# Patient Record
Sex: FEMALE | Race: WHITE | NOT HISPANIC OR LATINO | Employment: OTHER | URBAN - METROPOLITAN AREA
[De-identification: names, ages, dates, MRNs, and addresses within clinical notes are randomized per-mention and may not be internally consistent; named-entity substitution may affect disease eponyms.]

---

## 2018-02-23 ENCOUNTER — COMMUNICATION - HEALTHEAST (OUTPATIENT)
Dept: NEUROSURGERY | Facility: CLINIC | Age: 83
End: 2018-02-23

## 2018-02-23 DIAGNOSIS — I60.9 SAH (SUBARACHNOID HEMORRHAGE) (H): ICD-10-CM

## 2018-03-01 ENCOUNTER — OFFICE VISIT - HEALTHEAST (OUTPATIENT)
Dept: GERIATRICS | Facility: CLINIC | Age: 83
End: 2018-03-01

## 2018-03-01 DIAGNOSIS — I10 HYPERTENSION: ICD-10-CM
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
## 2018-03-01 DIAGNOSIS — S72.002A CLOSED LEFT HIP FRACTURE (H): ICD-10-CM

## 2018-03-01 DIAGNOSIS — I60.9 SUBARACHNOID HEMORRHAGE (H): ICD-10-CM

## 2018-03-01 DIAGNOSIS — R52 PAIN MANAGEMENT: ICD-10-CM

## 2018-03-05 ENCOUNTER — OFFICE VISIT - HEALTHEAST (OUTPATIENT)
Dept: GERIATRICS | Facility: CLINIC | Age: 83
End: 2018-03-05

## 2018-03-05 DIAGNOSIS — R29.898 LEG WEAKNESS: ICD-10-CM

## 2018-03-05 DIAGNOSIS — I10 HYPERTENSION: ICD-10-CM

## 2018-03-05 DIAGNOSIS — S72.115D CLOSED NONDISPLACED FRACTURE OF GREATER TROCHANTER OF LEFT FEMUR WITH ROUTINE HEALING, SUBSEQUENT ENCOUNTER: ICD-10-CM

## 2018-03-05 DIAGNOSIS — I60.9 SAH (SUBARACHNOID HEMORRHAGE) (H): ICD-10-CM

## 2018-03-06 ENCOUNTER — OFFICE VISIT - HEALTHEAST (OUTPATIENT)
Dept: NEUROSURGERY | Facility: CLINIC | Age: 83
End: 2018-03-06

## 2018-03-06 ENCOUNTER — HOSPITAL ENCOUNTER (OUTPATIENT)
Dept: CT IMAGING | Facility: CLINIC | Age: 83
Discharge: HOME | End: 2018-03-06
Attending: NEUROLOGICAL SURGERY

## 2018-03-06 DIAGNOSIS — I60.9 SAH (SUBARACHNOID HEMORRHAGE) (H): ICD-10-CM

## 2018-03-06 DIAGNOSIS — S01.01XA SCALP LACERATION: ICD-10-CM

## 2018-03-08 ENCOUNTER — OFFICE VISIT - HEALTHEAST (OUTPATIENT)
Dept: GERIATRICS | Facility: CLINIC | Age: 83
End: 2018-03-08

## 2018-03-08 DIAGNOSIS — S72.115D CLOSED NONDISPLACED FRACTURE OF GREATER TROCHANTER OF LEFT FEMUR WITH ROUTINE HEALING, SUBSEQUENT ENCOUNTER: ICD-10-CM

## 2018-03-08 DIAGNOSIS — I10 HYPERTENSION: ICD-10-CM

## 2018-03-08 DIAGNOSIS — I60.9 SAH (SUBARACHNOID HEMORRHAGE) (H): ICD-10-CM

## 2018-03-08 DIAGNOSIS — S01.01XA SCALP LACERATION: ICD-10-CM

## 2018-03-12 ENCOUNTER — AMBULATORY - HEALTHEAST (OUTPATIENT)
Dept: GERIATRICS | Facility: CLINIC | Age: 83
End: 2018-03-12

## 2021-05-30 ENCOUNTER — RECORDS - HEALTHEAST (OUTPATIENT)
Dept: ADMINISTRATIVE | Facility: CLINIC | Age: 86
End: 2021-05-30

## 2021-06-16 PROBLEM — S01.01XA SCALP LACERATION: Status: ACTIVE | Noted: 2018-03-06

## 2021-06-16 PROBLEM — I16.1 HYPERTENSIVE EMERGENCY: Status: ACTIVE | Noted: 2018-02-21

## 2021-06-16 PROBLEM — I60.9 SUBARACHNOID BLEED (H): Status: ACTIVE | Noted: 2018-02-21

## 2021-06-16 PROBLEM — I60.9 SAH (SUBARACHNOID HEMORRHAGE) (H): Status: ACTIVE | Noted: 2018-02-21

## 2021-06-16 PROBLEM — S72.112A: Status: ACTIVE | Noted: 2018-02-21

## 2021-06-16 NOTE — PROGRESS NOTES
[facility]    Facility:   [facility] [[id number]]   Code Status: DNR      CHIEF COMPLAINT/REASON FOR VISIT:  Chief Complaint   Patient presents with     Follow Up     rehab       HISTORY:      HPI: Crystal is a 82 y.o. female who I had a chance to visit with secondary to her hospitalization February 21 - February 25, 2018 secondary to a fall in the bathroom did sustain a subarachnoid hemorrhage also a left greater trochanteric femur fracture.  She does have a visit tomorrow for CT of the head and does visit with the neurosurgeon on March 13.  She has been normotensive and afebrile.  Only requiring Tylenol no other narcotics.  Appetite is picking up.  Seems to be in really good spirits.  She is hoping to be able to be discharged by the weekend.  Did not have any other particular complaints is working on her strength balance and conditioning.    No past medical history on file.          No family history on file.  Social History     Social History     Marital status:      Spouse name: N/A     Number of children: N/A     Years of education: N/A     Social History Main Topics     Smoking status: Unknown If Ever Smoked     Smokeless tobacco: Not on file     Alcohol use 1.8 oz/week     3 Glasses of wine per week     Drug use: No     Sexual activity: Not on file     Other Topics Concern     Not on file     Social History Narrative         Review of Systems  Currently denies chills fever coughing wheezing chest pain dizziness or vertigo nausea vomiting diarrhea dysuria rashes or sores.      Current Outpatient Prescriptions:      acetaminophen (TYLENOL) 325 MG tablet, Take 2 tablets (650 mg total) by mouth 4 (four) times a day., Disp: , Rfl: 0     acetaminophen (TYLENOL) 325 MG tablet, Take 2 tablets (650 mg total) by mouth every 4 (four) hours as needed., Disp: , Rfl: 0     amLODIPine (NORVASC) 5 MG tablet, Take 1 tablet (5 mg total) by mouth daily., Disp: 30 tablet, Rfl: 1      b complex vitamins tablet, Take 1 tablet by mouth daily., Disp: , Rfl:      famotidine (PEPCID) 20 MG tablet, Take 1 tablet (20 mg total) by mouth daily., Disp: , Rfl: 0     levETIRAcetam (KEPPRA) 250 MG tablet, Take 1 tablet (250 mg total) by mouth 2 (two) times a day for 12 days., Disp: , Rfl: 0     lisinopril (PRINIVIL,ZESTRIL) 20 MG tablet, Take 20 mg by mouth daily., Disp: , Rfl:      MULTIVITAMIN WITH MINERALS (HAIR,SKIN AND NAILS ORAL), Take 1 tablet by mouth daily., Disp: , Rfl:      multivitamin with minerals (THERA-M) 9 mg iron-400 mcg Tab tablet, Take 1 tablet by mouth daily., Disp: , Rfl:      OMEGA-3/DHA/EPA/FISH OIL (FISH OIL-OMEGA-3 FATTY ACIDS) 300-1,000 mg capsule, Take 1 g by mouth daily., Disp: , Rfl:      senna-docusate (PERICOLACE) 8.6-50 mg tablet, Take 1 tablet by mouth 2 (two) times a day as needed for constipation., Disp: , Rfl: 0    .There were no vitals filed for this visit.  Blood pressure 124/85 pulse 68 respirations 20 temperature 96.7  Physical Exam   Constitutional: She is oriented to person, place, and time. No distress.   HENT:   Head: Normocephalic.   Eyes: Pupils are equal, round, and reactive to light.   Neck: Neck supple. No thyromegaly present.   Cardiovascular: Normal rate, regular rhythm and normal heart sounds.    Pulmonary/Chest: Breath sounds normal.   Abdominal: Bowel sounds are normal. There is no tenderness. There is no guarding.   Musculoskeletal:   Working on her strength and balance.  No pain.   Lymphadenopathy:     She has no cervical adenopathy.   Neurological: She is alert and oriented to person, place, and time.   Skin: Skin is warm and dry. No rash noted.         LABS:   Lab Results   Component Value Date    WBC 8.4 02/23/2018    HGB 11.5 (L) 02/23/2018    HCT 33.6 (L) 02/23/2018    MCV 96 02/23/2018     02/23/2018     No results found for this or any previous visit.          ASSESSMENT:      ICD-10-CM    1. SAH (subarachnoid hemorrhage) I60.9    2.  Hypertension I10    3. Closed nondisplaced fracture of greater trochanter of left femur with routine healing, subsequent encounter S72.115D    4. Leg weakness R29.898        PLAN:    At this time no further changes are necessary.  She does have a visit tomorrow getting a CT of the head and does see the neurosurgeon on the 13th.  She is planning on potentially discharging soon.    .    Electronically signed by: Michael Duane Johnson, CNP

## 2021-06-16 NOTE — PROGRESS NOTES
[facility]      Facility:    [facility] [[id number]]  Code Status: UNKNOWN      Chief Complaint/Reason for Visit:  Chief Complaint   Patient presents with     H & P     Mechanical fall, right sided subarachnoid hemorrhage, left greater trochanter fracture, uncontrolled hypertension, seizure prophylaxis, pain management.       HPI:   Crystal is a 82 y.o. female who was recently admitted to the hospital on 2/21/2018.  She did have a fall in which her leg gave out in the bathroom.  She fell to the ground.  She fell onto her left side and did hit her head.  She was brought to the hospital and CT scan showed right sided sub-arachnoid hemorrhage and she also an x-ray that showed left greater trochanter fracture probable.  She was admitted to the ICU and orthopedic surgery and neurosurgery were consulted.  She did have subsequent CT scans with improvements and subarachnoid hemorrhage improved.  Orthopedic surgery did recommend conservative management at this time and follow-up with Orth O neuro in 2 weeks.  She did have uncontrolled hypertension in the hospital and Norvasc was started and she did maintain her lisinopril.  She was put on Keppra for about 2 weeks for seizure prophylactics.  Her overall condition did improve and she was transferred to the TCU at [place] in stable condition.  She does have a past medical history of hypertension however she does live independently alone.  She also has a history of GERD.    Patient is sitting on the side of her bed today.  She looks good and claims she is not in any real pain and pain is well controlled.  She is moving her bowels without difficulty and she is moving all 4 extremities.  She does deny any neurological signs and symptoms and denies any headache at this time.  Her blood pressure is little elevated today however we will check that every shift.  She denies any other issues at this time and staff revealed no new  concerns.    Past Medical History:  History reviewed. No pertinent past medical history.        Surgical History:  History reviewed. No pertinent surgical history.    Family History:   History reviewed. No pertinent family history.    Social History:    Social History     Social History     Marital status:      Spouse name: N/A     Number of children: N/A     Years of education: N/A     Social History Main Topics     Smoking status: Unknown If Ever Smoked     Smokeless tobacco: None     Alcohol use 1.8 oz/week     3 Glasses of wine per week     Drug use: No     Sexual activity: Not Asked     Other Topics Concern     None     Social History Narrative          Review of Systems   Constitutional:        Patient describes her pain is tolerable and minimal at this time.  She denies any fevers chills nausea vomiting diarrhea change in vision hearing taste or smell weakness one side the other chest pain shortness of breath.  She denies any incontinence urine or stool polyphagia or polydipsia polyuria urgency frequency or burning with urination depression or anxiety and the remainder of the review of systems is negative.       Vitals:    03/01/18 0721   BP: 148/87   Pulse: 77   Resp: 20   Temp: 97.6  F (36.4  C)   SpO2: 94%       Physical Exam   Constitutional: No distress.   Patient is a very thin appearing female who does not appear to be in acute distress.   HENT:   Head: Normocephalic and atraumatic.   Nose: Nose normal.   Mouth/Throat: No oropharyngeal exudate.   Eyes: Conjunctivae and EOM are normal. Right eye exhibits no discharge. Left eye exhibits no discharge. No scleral icterus.   Neck: Neck supple. No thyromegaly present.   Cardiovascular: Normal rate and regular rhythm.    Pulmonary/Chest: Effort normal and breath sounds normal. No respiratory distress. She exhibits no tenderness.   Abdominal: Soft. Bowel sounds are normal. She exhibits no distension. There is no tenderness.   Musculoskeletal: She  exhibits tenderness. She exhibits no edema.   Lymphadenopathy:     She has no cervical adenopathy.   Neurological: She is alert. No cranial nerve deficit. She exhibits normal muscle tone.   Skin: Skin is dry. She is not diaphoretic. No erythema. No pallor.   Psychiatric: She has a normal mood and affect. Her behavior is normal.       Medication List:  Current Outpatient Prescriptions   Medication Sig     acetaminophen (TYLENOL) 325 MG tablet Take 2 tablets (650 mg total) by mouth 4 (four) times a day.     acetaminophen (TYLENOL) 325 MG tablet Take 2 tablets (650 mg total) by mouth every 4 (four) hours as needed.     amLODIPine (NORVASC) 5 MG tablet Take 1 tablet (5 mg total) by mouth daily.     b complex vitamins tablet Take 1 tablet by mouth daily.     famotidine (PEPCID) 20 MG tablet Take 1 tablet (20 mg total) by mouth daily.     levETIRAcetam (KEPPRA) 250 MG tablet Take 1 tablet (250 mg total) by mouth 2 (two) times a day for 12 days.     lisinopril (PRINIVIL,ZESTRIL) 20 MG tablet Take 20 mg by mouth daily.     MULTIVITAMIN WITH MINERALS (HAIR,SKIN AND NAILS ORAL) Take 1 tablet by mouth daily.     multivitamin with minerals (THERA-M) 9 mg iron-400 mcg Tab tablet Take 1 tablet by mouth daily.     OMEGA-3/DHA/EPA/FISH OIL (FISH OIL-OMEGA-3 FATTY ACIDS) 300-1,000 mg capsule Take 1 g by mouth daily.     senna-docusate (PERICOLACE) 8.6-50 mg tablet Take 1 tablet by mouth 2 (two) times a day as needed for constipation.       Labs: Hospital labs are as follows; sodium was 137, potassium was 4.0, CO2 is 20, BUN was 12, creatinine 0.58, GFR was greater than 60, LFTs were within normal limits with the exception of albumin of 3.1.  White count was 8.4, hemoglobin was 11.5, platelets were 207,000.      Assessment:    ICD-10-CM    1. Subarachnoid hemorrhage I60.9    2. Fall W19.XXXA    3. Closed left hip fracture S72.002A    4. Pain management R52    5. Hypertension I10        Plan: CT scan of the head in 1 week without  contrast secondary subarachnoid hemorrhage.  Fall precautions will be monitored and will monitor blood pressure every shift at this time.  Her blood pressure slightly elevated at this time will continue to monitor and consider going up on her Norvasc at this time.  Pain is well-managed at this time so nothing will be done and will continue with physical and Occupational Therapy no new changes.        Electronically signed by: Keenan Beauchamp DO

## 2021-06-16 NOTE — PROGRESS NOTES
CHART NOTE     DATE OF SERVICE:  3/6/2018     : 1935   82 y.o.     ASSESSMENT :   .Doing well, function at baseline, SAH resolved,  Lac healed.     PLAN: Staples removed. Loosen restrictions. Restart baby ASA. Stop Keppra as planned. RTC prn. Enc to call with any new questions or concerns.     HPI:  Crystal Ibarra is status post hospital consult by Dr. Tobar on 18 for TRAUMATIC SAH.   Patient initially presented s/p mechanical fall sustaining head lac and closed L hip fx .  Head CT showed small volume subarachnoid hemorrhage within the right postcentral sulcus superiorly.  Keppra x 2 weeks.  Hip fx incomplete, will be treated conservatively per Ortho.     TODAY, Crystal Ibarra reports no HA, no N/V, No dizziness. Memory and cognition are at baseline, per patient and family.  Still has the staples in from her head lac.  Still on Keppra.  Off her baby ASA.      PAST MEDICAL HISTORY, SURGICAL HISTORY, REVIEW OF SYMPTOMS, MEDICATIONS AND ALLERGIES:  Past medical history, surgical history, ROS, medications and allergies reviewed with patient and remain unchanged from previous visit.    No past medical history on file.    PHYSICAL EXAM:    /68  Pulse 72  Resp 18    Neurological exam reveals:  Respirations easy, non-labored.   Skin: W/D/I. No rashes, lesions or breaks in integrity.   Recent and remote memory intact, fund of knowledge wnl.    Alert and oriented x3, speech fluent and appropriate.   Comprehension intact with 2 step crossover command.   Finger nose finger smooth and accurate  PERRL, EOMI, No nystagmus,   Face symmetric, tongue midline, Uvula midline,  palate rises with phonation   Shoulder shrug equal  DURÁN with good strength to exam   No extremity edema noted.   Muscle Bulk and tone wnl.   Reflexes: No pathological reflexes   Gait and station:In WC, not observed  Incision: CDI without erythema or edema, mod scabbing. Staples intact. Area cleansed and staples easily removed, edges remain  well approximated.      RADIOGRAPHIC IMAGING: Head CT:  Resolution of right parietal subarachnoid hemorrhage.     Films personally reviewed and interpreted.   Reviewed imaging with patient and family.

## 2021-06-16 NOTE — PROGRESS NOTES
[facility]    Facility:   [facility] [[id number]]   Code Status: DNR  PCP: No Primary Care Provider   Phone: None   Fax: [phone]      CHIEF COMPLAINT/REASON FOR VISIT:  Chief Complaint   Patient presents with     Discharge Summary       HISTORY COURSE:  Crystal is a 82 y.o. female who was recently admitted to the hospital on 2/21/2018.  She did have a fall in which her leg gave out in the bathroom.  She fell to the ground.  She fell onto her left side and did hit her head.  She was brought to the hospital and CT scan showed right sided sub-arachnoid hemorrhage and she also an x-ray that showed left greater trochanter fracture probable.  She was admitted to the ICU and orthopedic surgery and neurosurgery were consulted.  She did have subsequent CT scans with improvements and subarachnoid hemorrhage improved.  Orthopedic surgery did recommend conservative management at this time and follow-up with [facility] neuro in 2 weeks.  She did have uncontrolled hypertension in the hospital and Norvasc was started and she did maintain her lisinopril.  She was put on Keppra for about 2 weeks for seizure prophylactics.  Her overall condition did improve and she was transferred to the TCU at [place] in stable condition.  She does have a past medical history of hypertension however she does live independently alone.  She also has a history of GERD.  She has been quite successful with the rehabilitation program since her hospitalization.  She has been able to ambulate with the therapy staff.  She has been normotensive afebrile and also on room air.  Is on Tylenol scheduled.  Her left greater trochanter fracture has not been giving her any problems.  She does see orthopedics next week.  She did see neurology did have a CAT scan performed on the sixth also the Keppra was discontinued.  Appetite good.  Afebrile and on room air.  Neurologically intact.  Review of Systems  Currently denies  chills fever coughing wheezing chest pain dizziness or vertigo nausea vomiting diarrhea dysuria rashes or sores.  Vitals:    03/08/18 0817   BP: 150/70   Pulse: 60   Resp: 18   Temp: 97.1  F (36.2  C)   SpO2: 94%       Physical Exam  Constitutional: She is oriented to person, place, and time. No distress.   HENT: .   Eyes: Pupils are equal, round, and reactive to light.   Neck: Neck supple. No thyromegaly present.   Cardiovascular: Normal rate, regular rhythm and normal heart sounds.    Pulmonary/Chest: Breath sounds normal.   Abdominal: Bowel sounds are normal. There is no tenderness. There is no guarding.   Musculoskeletal:   Working on her strength and balance.  No pain.   Lymphadenopathy:     She has no cervical adenopathy.   Neurological: She is alert and oriented to person, place, and time.   Skin: Skin is warm and dry. No rash noted.     Lab Results   Component Value Date    WBC 8.4 02/23/2018    HGB 11.5 (L) 02/23/2018    HCT 33.6 (L) 02/23/2018    MCV 96 02/23/2018     02/23/2018     No results found for this or any previous visit.        MEDICATION LIST:  Current Outpatient Prescriptions   Medication Sig     acetaminophen (TYLENOL) 325 MG tablet Take 2 tablets (650 mg total) by mouth 4 (four) times a day.     amLODIPine (NORVASC) 5 MG tablet Take 1 tablet (5 mg total) by mouth daily.     b complex vitamins tablet Take 1 tablet by mouth daily.     famotidine (PEPCID) 20 MG tablet Take 1 tablet (20 mg total) by mouth daily.     lisinopril (PRINIVIL,ZESTRIL) 20 MG tablet Take 20 mg by mouth daily.     MULTIVITAMIN WITH MINERALS (HAIR,SKIN AND NAILS ORAL) Take 1 tablet by mouth daily.     multivitamin with minerals (THERA-M) 9 mg iron-400 mcg Tab tablet Take 1 tablet by mouth daily.     OMEGA-3/DHA/EPA/FISH OIL (FISH OIL-OMEGA-3 FATTY ACIDS) 300-1,000 mg capsule Take 1 g by mouth daily.       DISCHARGE DIAGNOSIS:    ICD-10-CM    1. SAH (subarachnoid hemorrhage) I60.9    2. Scalp laceration S01.01XA     3. Closed nondisplaced fracture of greater trochanter of left femur with routine healing, subsequent encounter S72.115D    4. Hypertension I10        MEDICAL EQUIPMENT NEEDS:  [employer]    DISCHARGE PLAN/FACE TO FACE:  I certify that services are/were furnished while this patient was under the care of a physician and that a physician or an allowed non-physician practitioner (NPP), had a face-to-face encounter that meets the physician face-to-face encounter requirements. The encounter was in whole, or in part, related to the primary reason for home health. The patient is confined to his/her home and needs intermittent skilled nursing, physical therapy, speech-language pathology, or the continued need for occupational therapy. A plan of care has been established by a physician and is periodically reviewed by a physician.  Date of Face-to-Face Encounter: March 8, 2018    I certify that, based on my findings, the following services are medically necessary home health services: She will be discharging to home with current medications will also have physical and occupational therapy home health aide and nursing.  She will be discharging on March 9 in the home care agency has not yet been identified.    My clinical findings support the need for the above skilled services because: (Please write a brief narrative summary that describes what the RN, PT, SLP, or other services will be doing in the home. A list of diagnoses in this section does not meet the CMS requirements.)  She will require skilled services secondary to her subarachnoid hemorrhage along with a left greater trochanteric fracture along with a history of hypertension medication management    This patient is homebound because: (Please write a brief narrative summary describing the functional limitations as to why this patient is homebound and specifically what makes this patient homebound.)  Secondary to multiple chronic medical conditions    The patient is, or  has been, under my care and I have initiated the establishment of the plan of care. This patient will be followed by a physician who will periodically review the plan of care.    Schedule follow up visit with primary care provider within 7 days to reestablish care.  She did see neurology on March 6.  CAT scan was performed the results not yet back by this dictation.  They did take out the 4 staples in her scalp.  No follow-up is necessary.  She does see somewhat orthopedics for her left hip on next Tuesday.  She will also follow-up with her primary care doctor regarding medication management and any future laboratory studies.  She felt it necessary to be in the transitional care unit and did not have any further questions or concerns.    Discharge coordination of care greater than 30 minutes.  Electronically signed by: Michael Duane Johnson, CNP

## 2022-05-11 ENCOUNTER — APPOINTMENT (OUTPATIENT)
Dept: RADIOLOGY | Facility: HOSPITAL | Age: 87
DRG: 535 | End: 2022-05-11
Attending: FAMILY MEDICINE
Payer: COMMERCIAL

## 2022-05-11 ENCOUNTER — HOSPITAL ENCOUNTER (INPATIENT)
Facility: HOSPITAL | Age: 87
LOS: 5 days | Discharge: NURSING, REHAB OR LONG-TERM CARE | DRG: 535 | End: 2022-05-17
Attending: FAMILY MEDICINE | Admitting: INTERNAL MEDICINE
Payer: COMMERCIAL

## 2022-05-11 ENCOUNTER — APPOINTMENT (OUTPATIENT)
Dept: CT IMAGING | Facility: HOSPITAL | Age: 87
DRG: 535 | End: 2022-05-11
Attending: STUDENT IN AN ORGANIZED HEALTH CARE EDUCATION/TRAINING PROGRAM
Payer: COMMERCIAL

## 2022-05-11 ENCOUNTER — APPOINTMENT (OUTPATIENT)
Dept: CT IMAGING | Facility: HOSPITAL | Age: 87
DRG: 535 | End: 2022-05-11
Attending: FAMILY MEDICINE
Payer: COMMERCIAL

## 2022-05-11 DIAGNOSIS — S32.592A CLOSED FRACTURE OF LEFT INFERIOR PUBIC RAMUS, INITIAL ENCOUNTER (H): ICD-10-CM

## 2022-05-11 DIAGNOSIS — S32.512A FRACTURE OF SUPERIOR PUBIC RAMUS, LEFT, CLOSED, INITIAL ENCOUNTER (H): ICD-10-CM

## 2022-05-11 LAB
ANION GAP SERPL CALCULATED.3IONS-SCNC: 11 MMOL/L (ref 5–18)
BNP SERPL-MCNC: 170 PG/ML (ref 0–167)
BUN SERPL-MCNC: 27 MG/DL (ref 8–28)
CALCIUM SERPL-MCNC: 10.2 MG/DL (ref 8.5–10.5)
CHLORIDE BLD-SCNC: 101 MMOL/L (ref 98–107)
CO2 SERPL-SCNC: 25 MMOL/L (ref 22–31)
CREAT SERPL-MCNC: 0.83 MG/DL (ref 0.6–1.1)
GFR SERPL CREATININE-BSD FRML MDRD: 68 ML/MIN/1.73M2
GLUCOSE BLD-MCNC: 98 MG/DL (ref 70–125)
MAGNESIUM SERPL-MCNC: 1.8 MG/DL (ref 1.8–2.6)
POTASSIUM BLD-SCNC: 3.8 MMOL/L (ref 3.5–5)
POTASSIUM BLD-SCNC: 4.6 MMOL/L (ref 3.5–5)
SARS-COV-2 RNA RESP QL NAA+PROBE: NEGATIVE
SODIUM SERPL-SCNC: 137 MMOL/L (ref 136–145)

## 2022-05-11 PROCEDURE — 99219 PR INITIAL OBSERVATION CARE,LEVEL II: CPT | Performed by: INTERNAL MEDICINE

## 2022-05-11 PROCEDURE — 36415 COLL VENOUS BLD VENIPUNCTURE: CPT | Performed by: HOSPITALIST

## 2022-05-11 PROCEDURE — 70450 CT HEAD/BRAIN W/O DYE: CPT

## 2022-05-11 PROCEDURE — 93005 ELECTROCARDIOGRAM TRACING: CPT | Performed by: FAMILY MEDICINE

## 2022-05-11 PROCEDURE — 80048 BASIC METABOLIC PNL TOTAL CA: CPT | Performed by: FAMILY MEDICINE

## 2022-05-11 PROCEDURE — 250N000013 HC RX MED GY IP 250 OP 250 PS 637: Performed by: INTERNAL MEDICINE

## 2022-05-11 PROCEDURE — 99285 EMERGENCY DEPT VISIT HI MDM: CPT | Mod: 25

## 2022-05-11 PROCEDURE — 71046 X-RAY EXAM CHEST 2 VIEWS: CPT

## 2022-05-11 PROCEDURE — 84132 ASSAY OF SERUM POTASSIUM: CPT | Performed by: HOSPITALIST

## 2022-05-11 PROCEDURE — 73502 X-RAY EXAM HIP UNI 2-3 VIEWS: CPT

## 2022-05-11 PROCEDURE — 83735 ASSAY OF MAGNESIUM: CPT | Performed by: HOSPITALIST

## 2022-05-11 PROCEDURE — 72192 CT PELVIS W/O DYE: CPT

## 2022-05-11 PROCEDURE — C9803 HOPD COVID-19 SPEC COLLECT: HCPCS

## 2022-05-11 PROCEDURE — G0378 HOSPITAL OBSERVATION PER HR: HCPCS

## 2022-05-11 PROCEDURE — 36415 COLL VENOUS BLD VENIPUNCTURE: CPT | Performed by: FAMILY MEDICINE

## 2022-05-11 PROCEDURE — 87635 SARS-COV-2 COVID-19 AMP PRB: CPT | Performed by: FAMILY MEDICINE

## 2022-05-11 PROCEDURE — 83880 ASSAY OF NATRIURETIC PEPTIDE: CPT | Performed by: FAMILY MEDICINE

## 2022-05-11 RX ORDER — AMOXICILLIN 250 MG
2 CAPSULE ORAL 2 TIMES DAILY PRN
Status: DISCONTINUED | OUTPATIENT
Start: 2022-05-11 | End: 2022-05-17 | Stop reason: HOSPADM

## 2022-05-11 RX ORDER — LISINOPRIL 20 MG/1
20 TABLET ORAL DAILY
Status: DISCONTINUED | OUTPATIENT
Start: 2022-05-12 | End: 2022-05-15

## 2022-05-11 RX ORDER — HYDROCHLOROTHIAZIDE 25 MG/1
25 TABLET ORAL DAILY
COMMUNITY
End: 2022-05-17

## 2022-05-11 RX ORDER — ACETAMINOPHEN 325 MG/1
650 TABLET ORAL EVERY 4 HOURS PRN
Status: DISCONTINUED | OUTPATIENT
Start: 2022-05-11 | End: 2022-05-12

## 2022-05-11 RX ORDER — AMOXICILLIN 250 MG
1 CAPSULE ORAL 2 TIMES DAILY PRN
Status: DISCONTINUED | OUTPATIENT
Start: 2022-05-11 | End: 2022-05-17 | Stop reason: HOSPADM

## 2022-05-11 RX ORDER — HYDROCHLOROTHIAZIDE 25 MG/1
25 TABLET ORAL DAILY
Status: DISCONTINUED | OUTPATIENT
Start: 2022-05-12 | End: 2022-05-17 | Stop reason: HOSPADM

## 2022-05-11 RX ADMIN — OXYCODONE HYDROCHLORIDE 5 MG: 5 TABLET ORAL at 23:52

## 2022-05-11 RX ADMIN — AMOXICILLIN AND CLAVULANATE POTASSIUM 1 TABLET: 875; 125 TABLET, FILM COATED ORAL at 19:36

## 2022-05-11 ASSESSMENT — ENCOUNTER SYMPTOMS
RHINORRHEA: 0
COUGH: 1
ARTHRALGIAS: 1

## 2022-05-11 ASSESSMENT — ACTIVITIES OF DAILY LIVING (ADL): DEPENDENT_IADLS:: CLEANING;SHOPPING;TRANSPORTATION

## 2022-05-11 NOTE — ED PROVIDER NOTES
EMERGENCY DEPARTMENT ENCOUNTER      NAME: Crystal Ibarra  AGE: 86 year old female  YOB: 1935  MRN: [id number]  EVALUATION DATE & TIME: No admission date for patient encounter.    PCP: No primary care provider on file.    ED PROVIDER: Joseph Rios M.D.    Chief Complaint   Patient presents with     Fall       FINAL IMPRESSION:  1. Closed fracture of left inferior pubic ramus, initial encounter (H)    2. Fracture of superior pubic ramus, left, closed, initial encounter (H)        ED COURSE & MEDICAL DECISION MAKING:    10:56 AM Met with patient for initial interview and exam. Discussed initial plan for care for their stay in the emergency department, prior records reviewed.  Differential diagnosis includes but not limited to intracranial injury, spinal injury,, heart failure, COPD, contusion, fracture.  Patient presents with a fall, she has some left hip pain.  On initial exam, she has no tenderness of the left hip and has no pain with straight leg raise.  CT scan of the head and neck are ordered.  Also noted to have low oxygen saturation initially, this seems to be improving in the emergency department and oxygen is being titrated down.  PPE: Provider wore gloves, and paper mask.  Pertinent Labs & Imaging studies personally reviewed and interpreted by me. (See chart for details)  11:41 AM patient rechecked, she is unable to stand without significant pain in the left hip with concern for pelvic fracture.  X-rays were ordered.  1:54 PM x-rays do demonstrate fracture of the left superior and inferior pubic rami on the left.  EKG demonstrates sinus rhythm with frequent PVCs.  2:00 PM patient rechecked, we discussed findings of x-ray.  Patient is unable to stand or ambulate without assistance due to pain.  Will admit for physical therapy evaluation, possible TCU placement.  He is agreeable to the plan  2:10 PM Spoke with Dr. Rousseau, hospitalist. They have accepted the patient for admission under  observation.        At the conclusion of the encounter I discussed the results of all of the tests and the disposition. The questions were answered. The patient or family acknowledged understanding and was agreeable with the care plan.     PROCEDURES:   Procedures    MEDICATIONS GIVEN IN THE EMERGENCY:  Medications - No data to display    NEW PRESCRIPTIONS STARTED AT TODAY'S ER VISIT  New Prescriptions    No medications on file       =================================================================    HPI    Patient information was obtained from: Patient      Crystal Ibarra is a 86 year old female with a pertinent history of SAH, OA and HTN who presents to this ED via EMS for evaluation of a fall.     Patient reports having suffered a mechanical fall prior to arrival. She reports having tripped over her feet causing her to fall backwards. She notes her friend who lives above her called EMS for her as well as helped the patient back up. Patient notes having hit her right arm but denies any pain. She is endorsing pain in her left hip. She denies having hit her head as well as any loss of consciousness. Patient is not on any blood thinners. She reports having a cough for a few months which she takes antibiotics for. Patient denies any rhinorrhea as well as any other complaints at the time.     Upon arrival, nurse noted the patient to be 85% on room air prompting her to put 4 liters of oxygen.     REVIEW OF SYSTEMS   Review of Systems   HENT: Negative for rhinorrhea.    Respiratory: Positive for cough (for a few months).    Musculoskeletal: Positive for arthralgias (left hip pain).   Neurological: Negative for syncope.   All other systems reviewed and are negative.     All other systems reviewed and negative    PAST MEDICAL HISTORY:  History reviewed. No pertinent past medical history.    PAST SURGICAL HISTORY:  History reviewed. No pertinent surgical history.    CURRENT MEDICATIONS:    No current facility-administered  "medications for this encounter.     Current Outpatient Medications   Medication     acetaminophen (TYLENOL) 325 MG tablet     amLODIPine (NORVASC) 5 MG tablet     b complex vitamins tablet     famotidine (PEPCID) 20 MG tablet     lisinopril (PRINIVIL,ZESTRIL) 20 MG tablet     MULTIVITAMIN WITH MINERALS (HAIR,SKIN AND NAILS ORAL)     multivitamin with minerals (THERA-M) 9 mg iron-400 mcg Tab tablet     OMEGA-3/DHA/EPA/FISH OIL (FISH OIL-OMEGA-3 FATTY ACIDS) 300-1,000 mg capsule       ALLERGIES:  No Known Allergies    FAMILY HISTORY:  No family history on file.    SOCIAL HISTORY:   Social History     Socioeconomic History     Marital status:    Tobacco Use     Smoking status: Unknown If Ever Smoked     Smokeless tobacco: Never Used   Substance and Sexual Activity     Alcohol use: Yes     Alcohol/week: 3.0 standard drinks     Drug use: No       VITALS:  /63   Pulse 93   Temp 97.9  F (36.6  C) (Oral)   Resp 20   Ht 1.511 m (4' 11.5\")   Wt 43.1 kg (95 lb)   SpO2 95%   BMI 18.87 kg/m      PHYSICAL EXAM:  Physical Exam  Vitals and nursing note reviewed.   Constitutional:       Appearance: Normal appearance.   HENT:      Head: Normocephalic and atraumatic.      Right Ear: External ear normal.      Left Ear: External ear normal.      Nose: Nose normal.      Mouth/Throat:      Mouth: Mucous membranes are moist.   Eyes:      Extraocular Movements: Extraocular movements intact.      Conjunctiva/sclera: Conjunctivae normal.      Pupils: Pupils are equal, round, and reactive to light.   Cardiovascular:      Rate and Rhythm: Normal rate and regular rhythm.   Pulmonary:      Effort: Pulmonary effort is normal.      Breath sounds: Rales (trace crackles in bases bilaterally) present. No wheezing.   Abdominal:      General: Abdomen is flat. There is no distension.      Palpations: Abdomen is soft.      Tenderness: There is no abdominal tenderness. There is no guarding.   Musculoskeletal:         General: Normal " range of motion.      Cervical back: Normal range of motion and neck supple.      Right lower leg: No edema.      Left lower leg: No edema.   Lymphadenopathy:      Cervical: No cervical adenopathy.   Skin:     General: Skin is warm and dry.   Neurological:      General: No focal deficit present.      Mental Status: She is alert and oriented to person, place, and time. Mental status is at baseline.      Comments: No gross focal neurologic deficits   Psychiatric:         Mood and Affect: Mood normal.         Behavior: Behavior normal.         Thought Content: Thought content normal.          LAB:  All pertinent labs reviewed and interpreted.  Results for orders placed or performed during the hospital encounter of 05/11/22   Head CT w/o contrast    Impression    IMPRESSION:  1.  No CT evidence for acute intracranial process.  2.  Brain atrophy and presumed chronic microvascular ischemic changes as above.  3.  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 No signs of pneumonia or failure. Heart and pulmonary vascularity are normal. S-shaped thoracolumbar scoliosis with the thoracic curve convex to the right. Severe degenerative   changes in both shoulders.   XR Pelvis and Hip Left 2 Views    Impression    IMPRESSION: Postoperative change right total hip arthroplasty. Components appear well-seated. Fractures to the left superior and inferior pubic rami adjacent to the left pubic body. Degenerative change left hip joint. No evidence for left hip joint   fracture. Pelvis negative for fracture. Diffuse demineralization. Vascular calcifications.       RADIOLOGY:  Reviewed all pertinent imaging. Please see official radiology report.  XR Pelvis and Hip Left 2 Views   Final Result   IMPRESSION: Postoperative change right total hip arthroplasty. Components appear well-seated. Fractures to the left  superior and inferior pubic rami adjacent to the left pubic body. Degenerative change left hip joint. No evidence for left hip joint    fracture. Pelvis negative for fracture. Diffuse demineralization. Vascular calcifications.      Head CT w/o contrast   Final Result   IMPRESSION:   1.  No CT evidence for acute intracranial process.   2.  Brain atrophy and presumed chronic microvascular ischemic changes as above.   3.  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      Chest XR,  PA & LAT   Final Result   IMPRESSION: Mild basilar fibrosis and scarring in the right middle lobe. No signs of pneumonia or failure. Heart and pulmonary vascularity are normal. S-shaped thoracolumbar scoliosis with the thoracic curve convex to the right. Severe degenerative    changes in both shoulders.          EKG:    Performed at: 11-MAY-2022 13:04  Impression: Sinus rhythm with frequent premature ventricular complexes  Rate: 83 bpm  Rhythm: sinus  Axis: -46  ME Interval: 202 ms  QRS Interval: 78 ms  QTc Interval: 437 ms  ST Changes: None  Comparison: No previous ECGs available    I have independently reviewed and interpreted the EKG(s) documented above.    I, Sandy Davila, am serving as a scribe to document services personally performed by Dr. Rios based on my observation and the provider's statements to me. I, Joseph Rios MD attest that Sandy Davila is acting in a scribe capacity, has observed my performance of the services and has documented them in accordance with my direction.    Joseph Rios M.D.  Emergency Medicine  [facility] EMERGENCY DEPARTMENT  [facility] [facility] [id number] [phone]  Dept: [phone]     Joseph Rios MD  05/11/22 3910

## 2022-05-11 NOTE — CONSULTS
"Care Management Initial Consult    General Information  Assessment completed with: Children, Latoya daughter via phone  Type of CM/SW Visit: Initial Assessment    Primary Care Provider verified and updated as needed: Yes   Readmission within the last 30 days: no previous admission in last 30 days      Reason for Consult: discharge planning  Advance Care Planning: Advance Care Planning Reviewed: no concerns identified          Communication Assessment  Patient's communication style: spoken language (English or Bilingual)                     Living Environment:   People in home: alone     Current living Arrangements: apartment ([facility])      Able to return to prior arrangements: other (see comments) (likely needs TCU)       Family/Social Support:  Care provided by: self, child(kentrell)  Provides care for: no one, unable/limited ability to care for self  Marital Status:   Children, Neighbor          Description of Support System: Supportive, Involved    Support Assessment: Adequate social supports, Adequate family and caregiver support, Patient communicates needs well met    Current Resources:   Patient receiving home care services: No     Community Resources: None  Equipment currently used at home: walker, rolling (\"She won't use the walker inside her apartment, but uses it when she goes outside\".)  Supplies currently used at home: Other (\"she has glasses, but doesn't like to use them\".)    Employment/Financial:  Employment Status: retired     Employment/ Comments: \"Her  was in the [place] War, and he has passed away. But she has never used any benfits\".  Financial Concerns:     Referral to Financial Worker: No       Lifestyle & Psychosocial Needs:  Social Determinants of Health     Tobacco Use: Unknown     Smoking Tobacco Use: Unknown If Ever Smoked     Smokeless Tobacco Use: Never Used   Alcohol Use: Not on file   Financial Resource Strain: Not on file " "  Food Insecurity: Not on file   Transportation Needs: Not on file   Physical Activity: Not on file   Stress: Not on file   Social Connections: Not on file   Intimate Partner Violence: Not on file   Depression: Not on file   Housing Stability: Not on file       Functional Status:  Prior to admission patient needed assistance:   Dependent ADLs:: Ambulation-walker, Independent  Dependent IADLs:: Cleaning, Shopping, Transportation (\"daughter helps\")  Assesssment of Functional Status: Not at baseline with ADL Functioning, Not at baseline with mobility, Not at  functional baseline    Mental Health Status:          Chemical Dependency Status:                Values/Beliefs:  Spiritual, Cultural Beliefs, Christianity Practices, Values that affect care:                 Additional Information:  Crystal lives at [facility] alone. There are no services at her apartment.    Her Daughter normally helps her with some needs, but she currently has COVID. Vonnie Klein states, \"I normally help her with some of her housekeeping, some laundry, and then shopping, and transportation since she has never driven. But I am 4 days into having COVID, so I will be unable to help her if she needed to come home, but hopefully next week I would be able to help her at home if that is needed\".    She will likely need TCU and wants [facility] as 1st choice. She has been there before, \"but it was a long time ago\".     Health transport at discharge.    Latoya daughter [phone].    Deandra Flowers RN      "

## 2022-05-11 NOTE — ED NOTES
Patient's monitor was alarming low HR in 40's via pulse ox. Placed on heart monitor, rate in 80's-90's showing bigeminal PVC's. EKG completed and given to provider.

## 2022-05-11 NOTE — ED TRIAGE NOTES
Patient arrives via [facility] after trip and fall in kitchen in which patient fell backwards hitting back of head, left hip, and left elbow. Patient states that she stumbled backwards and did not hit hard. Denies LOC, not on thinners. Friend who lives upstairs helped her off of the ground and called EMS. Patient reports left hip pain when standing. Denies HA, CP, dizziness, weakness. O2 on arrival was 85% on RA, dropped to 83%, placed on 4L per NC and sats recovered to 95%     Triage Assessment     Row Name 05/11/22 1103       Triage Assessment (Adult)    Airway WDL WDL       Respiratory WDL    Respiratory WDL X;cough;rhythm/pattern    Rhythm/Pattern, Respiratory other (see comments);unlabored  O2 on room air was 83%, placed on 4L nc    Cough Frequency infrequent  taking antibiotics for cough, 3 days left       Skin Circulation/Temperature WDL    Skin Circulation/Temperature WDL WDL       Peripheral/Neurovascular WDL    Peripheral Neurovascular WDL WDL       Cognitive/Neuro/Behavioral WDL    Cognitive/Neuro/Behavioral WDL WDL

## 2022-05-11 NOTE — H&P
[facility]    History and Physical - Hospitalist Service       Date of Admission:  5/11/2022    Assessment & Plan      Crystal Ibarra is a 86 year old female with a history of SAH and HTN who presents to ED for evaluation of left hip pain after a fall.    Mechanical fall, Pelvic fracture: XR reveals fractures to the left superior and inferior pubic rami adjacent to the left pubic body.   - Pain control: patient reports no pain at resting. Tylenol and oxycodone prn.  - PT/OT  - Weight bearing as tolerated.   - Orthopedic surgery consult    Essential hypertension: BP controlled. Resume PTA hydrochlorothiazide and lisinopril.        Diet:  regular diet  DVT Prophylaxis: Low risk. Expect to discharge in 1-2 days  Cruz Catheter: Not present  Central Lines: None  Cardiac Monitoring: None  Code Status:  Full code      Disposition Plan   Expected Discharge: 05/13/2022     Anticipated discharge location:  home vs TCU     The patient's care was discussed with the Bedside Nurse, Care Coordinator/ and Patient.    Smiley Maria MD  Hospitalist Service  [facility]  Securely message with the Vocera Web Console (learn more here)  Text page via [employer] Paging/Directory         ______________________________________________________________________    Chief Complaint   Left hip pain after a fall    History is obtained from the patient    History of Present Illness   Crystal Ibarra is a 86 year old female with a history of SAH and HTN who presents to ED for evaluation of left hip pain after a fall. patient lives alone in a senior apartment. Today, she accidentally fell. She thought that it is because her legs somehow got tangled. She fell backward. She reports not hitting her head. No LOC. She was able to get up by herself so that she called her friend to come help her get up. She developed left hip pain. She was able to bear weight and walk. She reports no fever, cough,  chest pain, SOB, nausea, vomiting, abdominal pain, numbness or weakness of extremities. In ER, she was found to have fractures to the left superior and inferior pubic rami adjacent to the left pubic body. Labs are unremarkable.     Review of Systems    The 10 point Review of Systems is negative other than noted in the HPI or here.     Past Medical History    I have reviewed this patient's medical history and updated it with pertinent information if needed.   Subarachnoid hemorrhage  Essential hypertension    Past Surgical History   I have reviewed this patient's surgical history and updated it with pertinent information if needed.  History reviewed. No pertinent surgical history.    Social History   I have reviewed this patient's social history and updated it with pertinent information if needed.  Social History     Tobacco Use     Smoking status: Unknown If Ever Smoked     Smokeless tobacco: Never Used   Substance Use Topics     Alcohol use: Yes     Alcohol/week: 3.0 standard drinks     Drug use: No       Family History   No pertinent family history    Prior to Admission Medications   Prior to Admission Medications   Prescriptions Last Dose Informant Patient Reported? Taking?   MULTIVITAMIN WITH MINERALS (HAIR,SKIN AND NAILS ORAL) 5/11/2022 at am  Yes Yes   Sig: [MULTIVITAMIN WITH MINERALS (HAIR,SKIN AND NAILS ORAL)] Take 1 tablet by mouth daily.   OMEGA-3/DHA/EPA/FISH OIL (FISH OIL-OMEGA-3 FATTY ACIDS) 300-1,000 mg capsule 5/10/2022 at Unknown time  Yes Yes   Sig: [OMEGA-3/DHA/EPA/FISH OIL (FISH OIL-OMEGA-3 FATTY ACIDS) 300-1,000 MG CAPSULE] Take 1 g by mouth daily.   amLODIPine (NORVASC) 5 MG tablet   No No   Sig: [AMLODIPINE (NORVASC) 5 MG TABLET] Take 1 tablet (5 mg total) by mouth daily.   amoxicillin-clavulanate (AUGMENTIN) 875-125 MG tablet 5/11/2022 at am  Yes Yes   Sig: Take 1 tablet by mouth 2 times daily Take until 5/14   b complex vitamins tablet 5/11/2022 at am  Yes Yes   Sig: [B COMPLEX VITAMINS  TABLET] Take 1 tablet by mouth daily.   hydrochlorothiazide (HYDRODIURIL) 25 MG tablet 5/11/2022 at am  Yes Yes   Sig: Take 25 mg by mouth daily   lisinopril (PRINIVIL,ZESTRIL) 20 MG tablet 5/11/2022 at am  Yes Yes   Sig: [LISINOPRIL (PRINIVIL,ZESTRIL) 20 MG TABLET] Take 20 mg by mouth daily.      Facility-Administered Medications: None     Allergies   No Known Allergies    Physical Exam   Vital Signs: Temp: 97.9  F (36.6  C) Temp src: Oral BP: 133/63 Pulse: 93   Resp: 20 SpO2: 95 % O2 Device: Nasal cannula Oxygen Delivery: 2 LPM  Weight: 95 lbs 0 oz    General appearance: not in acute distress  HEENT: PERRL, EOMI  Lungs: Clear breath sounds in bilateral lung fields  Cardiovascular: Regular rate and rhythm, normal S1-S2  Abdomen: Soft, non tender, no distension  Musculoskeletal: No joint swelling. Left posterior hip tenderness to palpation.  Skin: No rash and no edema  Neurology: AAO ×3.  Cranial nerves II - XII normal.  Normal muscle strength in all four extremities.    Data   Data reviewed today: I reviewed all medications, new labs and imaging results over the last 24 hours.     Recent Labs   Lab 05/11/22  1350      POTASSIUM 4.6   CHLORIDE 101   CO2 25   BUN 27   CR 0.83   ANIONGAP 11   HARJINDER 10.2   GLC 98       CT head:    IMPRESSION:  1.  No CT evidence for acute intracranial process.  2.  Brain atrophy and presumed chronic microvascular ischemic changes as above.  3.  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    XR pelvis:    IMPRESSION: Postoperative change right total hip arthroplasty. Components appear well-seated. Fractures to the left superior and inferior pubic rami adjacent to the left pubic body. Degenerative change left hip joint. No evidence for left hip joint fracture. Pelvis negative for fracture. Diffuse demineralization. Vascular calcifications.

## 2022-05-11 NOTE — PHARMACY-ADMISSION MEDICATION HISTORY
Pharmacy Note - Admission Medication History    Pertinent Provider Information: Augmentin PTA end date 5/14     ______________________________________________________________________    Prior To Admission (PTA) med list completed and updated in EMR.       PTA Med List   Medication Sig Last Dose     amoxicillin-clavulanate (AUGMENTIN) 875-125 MG tablet Take 1 tablet by mouth 2 times daily Take until 5/14 5/11/2022 at am     b complex vitamins tablet [B COMPLEX VITAMINS TABLET] Take 1 tablet by mouth daily. 5/11/2022 at am     hydrochlorothiazide (HYDRODIURIL) 25 MG tablet Take 25 mg by mouth daily 5/11/2022 at am     lisinopril (PRINIVIL,ZESTRIL) 20 MG tablet [LISINOPRIL (PRINIVIL,ZESTRIL) 20 MG TABLET] Take 20 mg by mouth daily. 5/11/2022 at am     MULTIVITAMIN WITH MINERALS (HAIR,SKIN AND NAILS ORAL) [MULTIVITAMIN WITH MINERALS (HAIR,SKIN AND NAILS ORAL)] Take 1 tablet by mouth daily. 5/11/2022 at am     OMEGA-3/DHA/EPA/FISH OIL (FISH OIL-OMEGA-3 FATTY ACIDS) 300-1,000 mg capsule [OMEGA-3/DHA/EPA/FISH OIL (FISH OIL-OMEGA-3 FATTY ACIDS) 300-1,000 MG CAPSULE] Take 1 g by mouth daily. 5/10/2022 at Unknown time       Information source(s): Patient and CareEverywhere/[facility]  Method of interview communication: in-person    Summary of Changes to PTA Med List  New: Augmentin 875-125mg bid , hydrochlorothiazide 25 mg daily  Discontinued: duplicate multivitamin, famotidine (not taking), amlodipine (not taking)  Changed: none    Patient was asked about OTC/herbal products specifically.  PTA med list reflects this.    In the past week, patient estimated taking medication this percent of the time:  greater than 90%.    Allergies were reviewed, assessed, and updated with the patient.      Patient does not use any multi-dose medications prior to admission.    The information provided in this note is only as accurate as the sources available at the time of the update(s).    Thank you for the opportunity to participate in the  care of this patient.    Emely Maurice, [facility]  5/11/2022 2:49 PM

## 2022-05-11 NOTE — ED NOTES
Pt had fall at home, has fractures to the left superior and inferior pubic rami adjacent to the left pubic body.  Pt has remained in bed, states it hurts to try to move or stand, no pain at rest.  Pt's HR has been SR with bigemy/PVC's.   Pt states she does not want to take any pain medications at this time.  Awaiting ortho consult.   is at bedside, awaiting hospital bed.

## 2022-05-12 ENCOUNTER — APPOINTMENT (OUTPATIENT)
Dept: PHYSICAL THERAPY | Facility: HOSPITAL | Age: 87
DRG: 535 | End: 2022-05-12
Attending: INTERNAL MEDICINE
Payer: COMMERCIAL

## 2022-05-12 ENCOUNTER — APPOINTMENT (OUTPATIENT)
Dept: OCCUPATIONAL THERAPY | Facility: HOSPITAL | Age: 87
DRG: 535 | End: 2022-05-12
Attending: INTERNAL MEDICINE
Payer: COMMERCIAL

## 2022-05-12 ENCOUNTER — APPOINTMENT (OUTPATIENT)
Dept: CARDIOLOGY | Facility: HOSPITAL | Age: 87
DRG: 535 | End: 2022-05-12
Attending: STUDENT IN AN ORGANIZED HEALTH CARE EDUCATION/TRAINING PROGRAM
Payer: COMMERCIAL

## 2022-05-12 LAB — LVEF ECHO: NORMAL

## 2022-05-12 PROCEDURE — 93306 TTE W/DOPPLER COMPLETE: CPT

## 2022-05-12 PROCEDURE — 250N000013 HC RX MED GY IP 250 OP 250 PS 637: Performed by: STUDENT IN AN ORGANIZED HEALTH CARE EDUCATION/TRAINING PROGRAM

## 2022-05-12 PROCEDURE — 97535 SELF CARE MNGMENT TRAINING: CPT | Mod: GO

## 2022-05-12 PROCEDURE — 120N000001 HC R&B MED SURG/OB

## 2022-05-12 PROCEDURE — 97162 PT EVAL MOD COMPLEX 30 MIN: CPT | Mod: GP

## 2022-05-12 PROCEDURE — 97530 THERAPEUTIC ACTIVITIES: CPT | Mod: GP

## 2022-05-12 PROCEDURE — 93306 TTE W/DOPPLER COMPLETE: CPT | Mod: 26 | Performed by: INTERNAL MEDICINE

## 2022-05-12 PROCEDURE — G0378 HOSPITAL OBSERVATION PER HR: HCPCS

## 2022-05-12 PROCEDURE — 99232 SBSQ HOSP IP/OBS MODERATE 35: CPT | Performed by: STUDENT IN AN ORGANIZED HEALTH CARE EDUCATION/TRAINING PROGRAM

## 2022-05-12 PROCEDURE — 97166 OT EVAL MOD COMPLEX 45 MIN: CPT | Mod: GO

## 2022-05-12 PROCEDURE — 250N000013 HC RX MED GY IP 250 OP 250 PS 637: Performed by: INTERNAL MEDICINE

## 2022-05-12 RX ORDER — ENOXAPARIN SODIUM 100 MG/ML
40 INJECTION SUBCUTANEOUS EVERY 24 HOURS
Status: DISCONTINUED | OUTPATIENT
Start: 2022-05-13 | End: 2022-05-17 | Stop reason: HOSPADM

## 2022-05-12 RX ORDER — ACETAMINOPHEN 325 MG/1
650 TABLET ORAL 4 TIMES DAILY
Status: DISCONTINUED | OUTPATIENT
Start: 2022-05-12 | End: 2022-05-17 | Stop reason: HOSPADM

## 2022-05-12 RX ADMIN — OXYCODONE HYDROCHLORIDE 5 MG: 5 TABLET ORAL at 04:04

## 2022-05-12 RX ADMIN — ACETAMINOPHEN 650 MG: 325 TABLET ORAL at 21:22

## 2022-05-12 RX ADMIN — ACETAMINOPHEN 650 MG: 325 TABLET ORAL at 18:21

## 2022-05-12 RX ADMIN — AMOXICILLIN AND CLAVULANATE POTASSIUM 1 TABLET: 875; 125 TABLET, FILM COATED ORAL at 21:21

## 2022-05-12 RX ADMIN — OXYCODONE HYDROCHLORIDE 5 MG: 5 TABLET ORAL at 12:44

## 2022-05-12 RX ADMIN — OXYCODONE HYDROCHLORIDE 5 MG: 5 TABLET ORAL at 09:00

## 2022-05-12 RX ADMIN — ACETAMINOPHEN 650 MG: 325 TABLET ORAL at 08:15

## 2022-05-12 RX ADMIN — LISINOPRIL 20 MG: 20 TABLET ORAL at 08:14

## 2022-05-12 RX ADMIN — AMOXICILLIN AND CLAVULANATE POTASSIUM 1 TABLET: 875; 125 TABLET, FILM COATED ORAL at 08:14

## 2022-05-12 RX ADMIN — HYDROCHLOROTHIAZIDE 25 MG: 25 TABLET ORAL at 08:15

## 2022-05-12 ASSESSMENT — ACTIVITIES OF DAILY LIVING (ADL)
ADLS_ACUITY_SCORE: 40
ADLS_ACUITY_SCORE: 37
ADLS_ACUITY_SCORE: 38

## 2022-05-12 NOTE — CONSULTS
ORTHOPEDIC CONSULTATION    Consultation  Crystal Ibarra,  1935, MRN [id number]    Fracture of superior pubic ramus, left, closed, initial encounter (H) [S32.863X]    PCP: Arcelia Pierre, [phone]   Code status:  Full Code       Extended Emergency Contact Information  Primary Emergency Contact: Latoya Koch   [facility]  Home Phone: [phone]  Mobile Phone: [phone]  Relation: Daughter  Secondary Emergency Contact: Rigoberto Koch   [facility]  Home Phone: [phone]  Relation: Son-in-Law         IMPRESSION:  Lateral compression type I (LC1) left pelvic ring fracture     PLAN:  This patient was discussed with Dr. Flores, on-call surgeon for [facility] and they are in agreement with the following plan. Orthopedics was consulted for evaluation/treamtent of acute left pelvic ring fracture. Patient reports left hip pain after a mechanical fall. XR/CT reveals lateral compression type I (LC1) left pelvic ring fracture. Imaging and physical exam do not indicate urgent/emergent surgery during hospital stay. Recommendation below:    - WBAT with walker and assistance  - Pain Management  - PT/OT evaluation  - No urgent/emergent surgical intervention indicated   - Ortho will sign off at this time. Please reach out if there is any questions/concerns     Thank you for including [facility] in the care of Crystal Ibarra. It has been a pleasure participating in her care.        CHIEF COMPLAINT: Left hip pain    HISTORY OF PRESENT ILLNESS:  The patient is seen in orthopedic consultation at the request of Dr. Maria.  The patient is a 86 year old female with mild and moderate pain, discomfort and ache of the left  hip. The patient reports that she fell yesterday (2022). Her feet got tangled and fell backward. She was not able to get up and experience left hip pain. She reports pain is worse with movement and relief when she is not moving. They report that their pain does not radiate.  Denies numbness or tingling of the lower leg      ALLERGIES:   Review of patient's allergies indicates No Known Allergies      MEDICATIONS UPON ADMISSION:  Medications were reviewed.  They include:   (Not in a hospital admission)        SOCIAL HISTORY:   she  has an unknown smoking status. She has never used smokeless tobacco. She reports current alcohol use of about 3.0 standard drinks of alcohol per week. She reports that she does not use drugs.      FAMILY HISTORY:  family history is not on file.      REVIEW OF SYSTEMS:   Reviewed with patient. See HPI, otherwise negative       PHYSICAL EXAMINATION:  Vitals: Temp:  [97.8  F (36.6  C)-98.6  F (37  C)] 98.4  F (36.9  C)  Pulse:  [74-98] 86  Resp:  [20-36] 20  BP: ()/(54-79) 120/63  SpO2:  [88 %-97 %] 91 %  General: On examination, the patient is awake and alert and oriented to person, place, time, and and general circumstances   SKIN: There is no evidence of ecchymosis, erythema, warmth, open wound or break in the skin of the LLE.  Pulses:  dorsalis pedis pulse is intact and equal bilaterally  Sensation: intact and equal bilaterally to the distal lower extremities.  Tenderness: no tenderness to the left lateral hip.  ROM: Full flexion and extension of the left hip and ankle.  Motor: LLE strength intact  Contralateral side= Full range of motion, Negative joint instability findings, 5/5 motor groups about the joint, Non-tender.       RADIOGRAPHIC EVALUATION:  Personally reviewed    Narrative & Impression   EXAM: CT PELVIS BONE WO CONTRAST  LOCATION: [facility]  DATE/TIME: 5/11/2022 9:32 PM     INDICATION: Pelvic fracture  COMPARISON: Radiographs from 05/11/2022.  TECHNIQUE: CT scan of the pelvis was performed without IV contrast. Multiplanar reformats were obtained. Dose reduction techniques were used.  CONTRAST: None.     FINDINGS:     PELVIS ORGANS: Extensive atherosclerotic vascular calcifications. Mildly distended urinary bladder.  Distal colonic diverticulosis. Small amount of stranding along the left pelvic sidewall consistent with pelvic hematoma from recent fracture.     MUSCULOSKELETAL: Extensive osseous demineralization. Acute fractures of the left superior pubic ramus near the pubic symphysis as well as the midportion of the left inferior pubic ramus. There is also a minimally displaced fracture of the lateral aspect   of the left sacral ala. The sacroiliac joints appear symmetric and intact. 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 No periprosthetic fracture. There is arthritic change of the left hip but no acute left hip fracture.                                                                      IMPRESSION:  1.  Findings consistent with a lateral compression type I (LC1) left pelvic ring fracture. Small left pelvic sidewall hematoma.       PERTINENT LABS:  Lab Results: personally reviewed.   Lab Results   Component Value Date     05/11/2022    CO2 25 05/11/2022    BUN 27 05/11/2022     Lab Results   Component Value Date    WBC 8.4 02/23/2018    HGB 11.5 02/23/2018    HCT 33.6 02/23/2018    MCV 96 02/23/2018     02/23/2018         Raven Carmen PA-C, [facility]  Date: 5/12/2022  Time: 11:17 AM    CC1:   Esha Salvador*    CC2:   Arcelia Pierre

## 2022-05-12 NOTE — UTILIZATION REVIEW
Admission Status; Secondary Review Determination   Under the authority of the Utilization Management Committee, the utilization review process indicated a secondary review on Crystal Ibarra. The review outcome is based on review of the medical records, discussions with staff, and applying clinical experience noted on the date of the review.   (x) Inpatient Status Appropriate - This patient's medical care is consistent with medical management for inpatient care and reasonable inpatient medical practice.     RATIONALE FOR DETERMINATION   86 yr old female with hx HTN presented after fall with hip pain.  Noted left superior and inferior pubic rami fracture adjacent to left pubic body.  While working on pain control has been noted to have hypoxia requiring 2L NC with sats <88% initially now 91% on 2L.  Also having HR bigemy/PVCs of no prior hx and undetermined significance at this time. Anticipated patient will require second night stay for pain management as well as assessing new hypoxia.    At the time of admission with the information available to the attending physician more than 2 nights Hospital complex care was anticipated, based on patient risk of adverse outcome if treated as outpatient and complex care required. Inpatient admission is appropriate based on the Medicare guidelines.   The information on this document is developed by the utilization review team in order for the business office to ensure compliance. This only denotes the appropriateness of proper admission status and does not reflect the quality of care rendered.   The definitions of Inpatient Status and Observation Status used in making the determination above are those provided in the CMS Coverage Manual, Chapter 1 and Chapter 6, section 70.4.   Sincerely,   Shannan Calvillo MD  Utilization Review  Physician Advisor  [facility]

## 2022-05-12 NOTE — ED NOTES
Pts son in law rigoberto called wanting to visit pt.  Rigoberto is on his 9th day of covid and wondering if he can come visit.  I deferred to the supervisor on the policy for that now and nata(supervisor) said he can come in to visit on day 11,which is Saturday. I called rigoberto back and told him of the policy. He was ok with that.

## 2022-05-12 NOTE — PROGRESS NOTES
05/12/22 1500   Quick Adds   Type of Visit Initial Occupational Therapy Evaluation   Living Environment   People in Home alone   Current Living Arrangements independent living facility   Transportation Anticipated family or friend will provide   Living Environment Comments Does not drive   Instrumental Activities of Daily Living (IADL)   IADL Comments Daughter assists with laundry, cleaning and shopping   General Information   Onset of Illness/Injury or Date of Surgery 05/11/22   Referring Physician Smiley Maria MD   Patient/Family Therapy Goal Statement (OT) To go home   Existing Precautions/Restrictions fall;weight bearing;other (see comments)  (LLE WBAT)   Left Lower Extremity (Weight-bearing Status) weight-bearing as tolerated (WBAT)   Cognitive Status Examination   Orientation Status person;place   Safety Deficit impulsivity   Cognitive Status Comments Further cognitive assessment recommended.   Range of Motion Comprehensive   General Range of Motion bilateral upper extremity ROM WFL   Transfers   Transfers sit-stand transfer   Sit-Stand Transfer   Sit-Stand [place] (Transfers) minimum assist (75% patient effort)   Assistive Device (Sit-Stand Transfers) walker, front-wheeled   Sit/Stand Transfer Comments Sit<>stand with FWW X 3 trials. Max verbal cues for hand placement and walker safety. Patient impulsive and stood from chair prior to full instructions given. Impulsive to sit back down quickly and without upper body support. Unable to remain in a standing position for longer than 1-2 seconds.   Clinical Impression   Criteria for Skilled Therapeutic Interventions Met (OT) Yes, treatment indicated   OT Diagnosis Decreased ADL independence   Influenced by the following impairments Pelvic fx   OT Problem List-Impairments impacting ADL problems related to;activity tolerance impaired;balance;fear & anxiety;mobility;range of motion (ROM);strength;pain;cognition   Assessment of Occupational Performance 3-5  Performance Deficits   Identified Performance Deficits dressing, toileting, showering, transfers, bed mobility, standing balance.   Planned Therapy Interventions (OT) ADL retraining;IADL retraining;balance training;bed mobility training;cognition;strengthening;transfer training;progressive activity/exercise   Clinical Decision Making Complexity (OT) moderate complexity   Risk & Benefits of therapy have been explained evaluation/treatment results reviewed   OT Discharge Planning   OT Discharge Recommendation (DC Rec) Transitional Care Facility   OT Rationale for DC Rec Needs assist of 1 for all transfers and ADL's.   Total Evaluation Time (Minutes)   Total Evaluation Time (Minutes) 15   OT Goals   Therapy Frequency (OT) Daily   OT Predicted Duration/Target Date for Goal Attainment 05/18/22   OT Goals Hygiene/Grooming;Lower Body Dressing;Transfers;Toilet Transfer/Toileting   OT: Hygiene/Grooming supervision/stand-by assist;while standing   OT: Lower Body Dressing Supervision/stand-by assist;using adaptive equipment   OT: Transfer Supervision/stand-by assist;with assistive device   OT: Toilet Transfer/Toileting Supervision/stand-by assist

## 2022-05-12 NOTE — PLAN OF CARE
PRIMARY DIAGNOSIS: ACUTE PAIN  OUTPATIENT/OBSERVATION GOALS TO BE MET BEFORE DISCHARGE:  1. Pain Status: Improved with use of alternative comfort measures i.e.: positioning and distraction using TV and visitors    2. Return to near baseline physical activity: No    3. Cleared for discharge by consultants (if involved): No    Pt able to logroll for bedpan this morning; complains of pain with movement.  Denied need for pain meds while at rest.  Awaiting PT/OT eval.  Transferring to room 426 for continued care.  Report called to receiving Ashley FLORES.

## 2022-05-12 NOTE — PLAN OF CARE
Problem: Functional Ability Impaired (Orthopaedic Fracture)  Goal: Optimal Functional Ability  Outcome: Ongoing, Progressing  Intervention: Optimize Functional Ability     Problem: Pain (Orthopaedic Fracture)  Goal: Acceptable Pain Control  Outcome: Ongoing, Progressing  Intervention: Manage Acute Orthopaedic-Related Pain    Pt reports back and pelvis/hip pain.  Pain controlled with prn oxycodone given x2 and repositioning prn.  Has not been OOB yet, using purewick.  On 2L O2 with sat 91-95%.  Tele with frequent PVCs/ transitioning in and out of [facility]. K and Mg checked.

## 2022-05-12 NOTE — PROVIDER NOTIFICATION
Dr. Salvador updated about pt's tele showing trigeminy w/ PVC of 24-26 per min. HR 63. O2 sat 94% on 2 L via NC. Pt was asymptomatic when assessed. New order for echocardiogram placed by MD.

## 2022-05-12 NOTE — PROGRESS NOTES
05/12/22 1325   Quick Adds   Type of Visit Initial PT Evaluation   Living Environment   People in Home alone   Current Living Arrangements independent living facility   Home Accessibility no concerns   Transportation Anticipated family or friend will provide   Self-Care   Usual Activity Tolerance good   Current Activity Tolerance poor   Equipment Currently Used at Home walker, rolling  (4WW)   Fall history within last six months yes   General Information   Onset of Illness/Injury or Date of Surgery 05/11/22   Referring Physician Smiley Maria MD   Patient/Family Therapy Goals Statement (PT) return home   Pertinent History of Current Problem (include personal factors and/or comorbidities that impact the POC) lateral compression type I (LC1) left pelvic ring fracture   Existing Precautions/Restrictions fall   General Observations WBAT with FWW per Ortho   Strength (Manual Muscle Testing)   Strength (Manual Muscle Testing) Deficits observed during functional mobility   Bed Mobility   Bed Mobility supine-sit   Supine-Sit [place] (Bed Mobility) supervision;verbal cues   Assistive Device (Bed Mobility) bed rails   Comment, (Bed Mobility) Increased time and effort due to pain.   Transfers   Transfers sit-stand transfer;bed-chair transfer   Bed-Chair Transfer   Assistive Device (Bed-Chair Transfers)   (none)   Bed-Chair [place] (Transfers) moderate assist (50% patient effort);maximum assist (25% patient effort);2 person assist   Comment, (Bed-Chair Transfer) Arm in arm, pivot transfer   Sit-Stand Transfer   Sit-Stand [place] (Transfers) minimum assist (75% patient effort);verbal cues;2 person assist   Assistive Device (Sit-Stand Transfers) walker, front-wheeled   Gait/Stairs (Locomotion)   Comment, (Gait/Stairs) n/a- pt unable to amb at this time.   Clinical Impression   Criteria for Skilled Therapeutic Intervention Yes, treatment indicated   PT Diagnosis (PT) Impaired functional mobility   Influenced by  the following impairments pain, weakness   Functional limitations due to impairments Impaired transfers, impaired gait, impaired strength   Clinical Presentation (PT Evaluation Complexity) Stable/Uncomplicated   Clinical Presentation Rationale Presents as diagnosed   Clinical Decision Making (Complexity) moderate complexity   Planned Therapy Interventions (PT) balance training;bed mobility training;gait training;strengthening;transfer training   Anticipated Equipment Needs at Discharge (PT) walker, rolling   Risk & Benefits of therapy have been explained patient   PT Discharge Planning   PT Discharge Recommendation (DC Rec) Transitional Care Facility   PT Rationale for DC Rec Pt lives alone, Ax2 for mobility. Unable to ambulate.   Total Evaluation Time   Total Evaluation Time (Minutes) 5   Physical Therapy Goals   PT Frequency Daily   PT Predicted Duration/Target Date for Goal Attainment 05/19/22   PT Goals Bed Mobility;Transfers;Gait   PT: Bed Mobility Supervision/stand-by assist;Supine to/from sit   PT: Transfers Supervision/stand-by assist;Sit to/from stand;Bed to/from chair;Assistive device   PT: Gait Minimal assist;Rolling walker;25 feet       Raquel Contreras, PT, DPT  5/12/2022

## 2022-05-12 NOTE — PROGRESS NOTES
Pt arrived to room 426 from boarding in ED around 1230.  Pt arrived with pain of 7 out of 10 with activity, but stated no pain at rest.  Received Oxycodone at 1244.  Pt alert and orientated.  Tele patches applied as MD restarted Tele monitoring.

## 2022-05-12 NOTE — ED NOTES
Pts son in law janeen called wondering if he could come into the hospital to visit pt. He is 9 days into

## 2022-05-12 NOTE — PROGRESS NOTES
[facility]    Medicine Progress Note - Hospitalist Service    Date of Admission:  5/11/2022    Assessment & Plan        Crystal Ibarra is a 86 year old female with a history of SAH and HTN was admitted May 11, 2022 with a left superior and inferior pubic rami hip fracture following a mechanical fall.    Acute left pelvic ring fracture  Left hip pain  Mechanical fall   -Patient sustained a fall on May 11, feet got entangled, tripped and fell backward  -Sustained significant left hip pain, worse with movement  -CT pelvis consistent with a lateral compression type I left pelvic ring fracture, with a small left pelvic sidewall hematoma.  -Orthopedics consulted, no urgent or emergent surgical intervention indicated  -PT OT evaluation  -Pain control: patient reports no pain at resting. Scheduled tylenol and oxycodone prn.  -Weight bearing as tolerated.     # Essential hypertension:   - BP controlled.   - Resume PTA hydrochlorothiazide and lisinopril.    # Hypoxia  # Resolving community-acquired pneumonia/bronchitis  -Seen by PCP May 9 , with 3 weeks of cough, treated for possible bronchitis  -Started Augmentin May 9, will continue here to complete a 5-day course  - In the ED, patient requiring 2 L of oxygen to keep sats over 92%  - X-ray with mild bibasilar fibrosis and scarring in the right middle lobe  - Patient non tachycardic at this time, Wells score 1.5.  Low risk for PE at this time  - Hypoxia may be related to side effects of opioids  - Wean off as able, continue to monitor    Frequent PVCs/bigeminy  -Seen on telemetry in the ED.   -Patient is asymptomatic  -Potassium 3.8, mag 1.8  -Keep on telemetry.  Echo ordered.    Hypercalcemia: Ca = 10.2 mg/dL (Ref range: 8.5 - 10.5 mg/dL) and/or iCa = N/A on admission, will monitor as appropriate     Diet: Regular Diet Adult    DVT Prophylaxis: Enoxaparin (Lovenox) SQ  Cruz Catheter: Not present  Central Lines: None  Cardiac Monitoring: None  Code  Status: Full Code      Disposition Plan   Expected Discharge: 05/13/2022     Anticipated discharge location:  Awaiting care coordination huddle  Delays: PT / OT dispo recommendations, resolution of hypoxia    The patient's care was discussed with the Bedside Nurse and patient    Esha Salvador MD  Hospitalist Service  [facility]  Securely message with the Vocera Web Console (learn more here)  Text page via [employer] Paging/Directory             ______________________________________________________________________    Interval History   Patient new to me  Care team notes reviewed  Evaluated patient at bedside.  Found patient propped up in bed on 2 L of nasal cannula.  At present complains of left hip pain when moved changes position, pain is relieved when not moving.  Pain non radiating and not associated with any numbness or tingling of the lower extremities    At this time patient denies any fevers, chills  Has been having a cough for the last 3 weeks, saw PCP who prescribed Augmentin.  Cough improving.  No chest pain.  Does not feel any short of breath.  Denies any dysuria frequency or lower abdominal pain  Denies any diarrhea, nausea, vomiting.  Care plan discussed.    Data reviewed today: I reviewed all medications, new labs and imaging results over the last 24 hours.     Physical Exam   Vital Signs: Temp: 98.6  F (37  C) Temp src: Tympanic BP: 106/66 Pulse: 74   Resp: 20 SpO2: 93 % O2 Device: Nasal cannula Oxygen Delivery: 2 LPM  Weight: 95 lbs 0 oz    General: AAOx3, NAD  HEENT: Oral mucosa moist and non-erythematous, PERRLA, EOM intact  CV: RRR, normal S1S2, no murmur, clicks, rubs  Resp: Clear to auscultation bilaterally, no wheezes, rhonchi  Abd: Soft, non-tender, BS+, no masses appreciated  Extremities: Radial and pedal pulses intact and symmetric, no pedal edema  Neuro: No lateralizing symptoms or focal neurologic deficits      Data   Recent Labs   Lab 05/11/22  0478  22  1350   NA  --  137   POTASSIUM 3.8 4.6   CHLORIDE  --  101   CO2  --  25   BUN  --  27   CR  --  0.83   ANIONGAP  --  11   HARJINDER  --  10.2   GLC  --  98     Recent Results (from the past 24 hour(s))   CT Pelvis Bone wo Contrast    Narrative    EXAM: CT PELVIS BONE WO CONTRAST  LOCATION: [facility]  DATE/TIME: 2022 9:32 PM    INDICATION: Pelvic fracture  COMPARISON: Radiographs from 2022.  TECHNIQUE: CT scan of the pelvis was performed without IV contrast. Multiplanar reformats were obtained. Dose reduction techniques were used.  CONTRAST: None.    FINDINGS:    PELVIS ORGANS: Extensive atherosclerotic vascular calcifications. Mildly distended urinary bladder. Distal colonic diverticulosis. Small amount of stranding along the left pelvic sidewall consistent with pelvic hematoma from recent fracture.    MUSCULOSKELETAL: Extensive osseous demineralization. Acute fractures of the left superior pubic ramus near the pubic symphysis as well as the midportion of the left inferior pubic ramus. There is also a minimally displaced fracture of the lateral aspect   of the left sacral ala. The sacroiliac joints appear symmetric and intact. 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 No periprosthetic fracture. There is arthritic change of the left hip but no acute left hip fracture.      Impression    IMPRESSION:  1.  Findings consistent with a lateral compression type I (LC1) left pelvic ring fracture. Small left pelvic sidewall hematoma.   Echocardiogram Complete   Result Value    LVEF  60-65%    Narrative    [id number]  [id number]  [id number]  [id number]^JONES^CAL[place]IN[place] [address]     Name: MIQUEL MORRISON  MRN: [id number]  : 1935  Study Date: 2022 04:13 PM  Age: 86 yrs  Gender: Female  Patient Location: [facility]  Reason For Study: Abn EKG  Ordering Physician: CALVIN GOLDMAN  Performed By: ACE     BSA: 1.3 m2  Height: 59  in  Weight: 94 lb  HR: 42  BP: 100/52 mmHg  ______________________________________________________________________________  Procedure  Complete Echo Adult. There is no comparison study available. Technically  difficult, suboptimal study.  ______________________________________________________________________________  Interpretation Summary     1.Left ventricular size, wall motion and function are normal. The ejection  fraction is 60-65%.  2.Normal right ventricle size and systolic function.  3.The left atrium is mild to moderately dilated. The right atrium is mildly  dilated.  4.There is moderate (2+) tricuspid regurgitation.  5.Technically difficult, suboptimal study due to patient cooperation.  There is no comparison study available.  ______________________________________________________________________________  I      WMSI = 1.00     % Normal = 100     X - Cannot   0 -                      (2) - Mildly 2 -          Segments  Size  Interpret    Hyperkinetic 1 - Normal  Hypokinetic  Hypokinetic  1-2     small                                                     7 -          3-5      moderate  3 - Akinetic 4 -          5 -         6 - Akinetic Dyskinetic   6-14    large               Dyskinetic   Aneurysmal  w/scar       w/scar       15-16   diffuse     Left Ventricle  Left ventricular size, wall motion and function are normal. The ejection  fraction is 60-65%. There is normal left ventricular wall thickness. Left  ventricular diastolic function is normal. No regional wall motion  abnormalities noted.     Right Ventricle  Normal right ventricle size and systolic function.     Atria  The left atrium is mild to moderately dilated. The right atrium is mildly  dilated. There is no color Doppler evidence of an atrial shunt.     Mitral Valve  There is moderate mitral annular calcification. The mitral valve leaflets  appear thickened, but open well. There is trace mitral regurgitation. There is  no mitral valve stenosis.      Tricuspid Valve  The tricuspid valve is not well visualized, but is grossly normal. There is  moderate (2+) tricuspid regurgitation. There is no tricuspid stenosis.     Aortic Valve  The aortic valve is trileaflet. Thickened aortic valve leaflets. No aortic  regurgitation is present. No aortic stenosis is present.     Pulmonic Valve  The pulmonic valve is not well seen, but is grossly normal. This degree of  valvular regurgitation is within normal limits. There is no pulmonic valvular  stenosis.     Vessels  The aorta root is normal. Normal size ascending aorta. IVC diameter <2.1 cm  collapsing >50% with sniff suggests a normal RA pressure of 3 mmHg.     Pericardium  There is no pericardial effusion.     Rhythm  Sinus rhythm was noted.     ______________________________________________________________________________  MMode/2D Measurements & Calculations  IVSd: 0.79 cm  LVIDd: 3.8 cm  LVIDs: 2.3 cm  LVPWd: 0.68 cm  FS: 40.0 %     LV mass(C)d: 77.5 grams  LV mass(C)dI: 58.0 grams/m2  Ao root diam: 3.0 cm  LA dimension: 3.2 cm  LA/Ao: 1.1  LVOT diam: 2.2 cm  LVOT area: 3.9 cm2  LA Volume Indexed (AL/bp): 40.3 ml/m2  RWT: 0.35     Time Measurements  MM HR: 56.0 BPM     ______________________________________________________________________________  Report approved by: Mckenna Escalante 05/12/2022 04:57 PM

## 2022-05-13 ENCOUNTER — APPOINTMENT (OUTPATIENT)
Dept: OCCUPATIONAL THERAPY | Facility: HOSPITAL | Age: 87
DRG: 535 | End: 2022-05-13
Payer: COMMERCIAL

## 2022-05-13 ENCOUNTER — APPOINTMENT (OUTPATIENT)
Dept: PHYSICAL THERAPY | Facility: HOSPITAL | Age: 87
DRG: 535 | End: 2022-05-13
Payer: COMMERCIAL

## 2022-05-13 LAB
ANION GAP SERPL CALCULATED.3IONS-SCNC: 11 MMOL/L (ref 5–18)
ATRIAL RATE - MUSE: 83 BPM
BASOPHILS # BLD AUTO: 0.1 10E3/UL (ref 0–0.2)
BASOPHILS NFR BLD AUTO: 1 %
BUN SERPL-MCNC: 26 MG/DL (ref 8–28)
CALCIUM SERPL-MCNC: 8.9 MG/DL (ref 8.5–10.5)
CHLORIDE BLD-SCNC: 99 MMOL/L (ref 98–107)
CO2 SERPL-SCNC: 23 MMOL/L (ref 22–31)
CREAT SERPL-MCNC: 0.78 MG/DL (ref 0.6–1.1)
DIASTOLIC BLOOD PRESSURE - MUSE: 63 MMHG
EOSINOPHIL # BLD AUTO: 0.2 10E3/UL (ref 0–0.7)
EOSINOPHIL NFR BLD AUTO: 2 %
ERYTHROCYTE [DISTWIDTH] IN BLOOD BY AUTOMATED COUNT: 14 % (ref 10–15)
GFR SERPL CREATININE-BSD FRML MDRD: 74 ML/MIN/1.73M2
GLUCOSE BLD-MCNC: 104 MG/DL (ref 70–125)
HCT VFR BLD AUTO: 35.8 % (ref 35–47)
HGB BLD-MCNC: 12.4 G/DL (ref 11.7–15.7)
IMM GRANULOCYTES # BLD: 0.1 10E3/UL
IMM GRANULOCYTES NFR BLD: 1 %
INTERPRETATION ECG - MUSE: NORMAL
LYMPHOCYTES # BLD AUTO: 0.8 10E3/UL (ref 0.8–5.3)
LYMPHOCYTES NFR BLD AUTO: 9 %
MCH RBC QN AUTO: 32.5 PG (ref 26.5–33)
MCHC RBC AUTO-ENTMCNC: 34.6 G/DL (ref 31.5–36.5)
MCV RBC AUTO: 94 FL (ref 78–100)
MONOCYTES # BLD AUTO: 0.8 10E3/UL (ref 0–1.3)
MONOCYTES NFR BLD AUTO: 9 %
NEUTROPHILS # BLD AUTO: 7.6 10E3/UL (ref 1.6–8.3)
NEUTROPHILS NFR BLD AUTO: 78 %
NRBC # BLD AUTO: 0 10E3/UL
NRBC BLD AUTO-RTO: 0 /100
P AXIS - MUSE: 52 DEGREES
PLATELET # BLD AUTO: 230 10E3/UL (ref 150–450)
POTASSIUM BLD-SCNC: 3.4 MMOL/L (ref 3.5–5)
POTASSIUM BLD-SCNC: 3.8 MMOL/L (ref 3.5–5)
PR INTERVAL - MUSE: 202 MS
QRS DURATION - MUSE: 78 MS
QT - MUSE: 372 MS
QTC - MUSE: 437 MS
R AXIS - MUSE: -46 DEGREES
RBC # BLD AUTO: 3.82 10E6/UL (ref 3.8–5.2)
SODIUM SERPL-SCNC: 133 MMOL/L (ref 136–145)
SYSTOLIC BLOOD PRESSURE - MUSE: 133 MMHG
T AXIS - MUSE: 36 DEGREES
VENTRICULAR RATE- MUSE: 83 BPM
WBC # BLD AUTO: 9.5 10E3/UL (ref 4–11)

## 2022-05-13 PROCEDURE — 36415 COLL VENOUS BLD VENIPUNCTURE: CPT | Performed by: STUDENT IN AN ORGANIZED HEALTH CARE EDUCATION/TRAINING PROGRAM

## 2022-05-13 PROCEDURE — 80048 BASIC METABOLIC PNL TOTAL CA: CPT | Performed by: STUDENT IN AN ORGANIZED HEALTH CARE EDUCATION/TRAINING PROGRAM

## 2022-05-13 PROCEDURE — 85004 AUTOMATED DIFF WBC COUNT: CPT | Performed by: STUDENT IN AN ORGANIZED HEALTH CARE EDUCATION/TRAINING PROGRAM

## 2022-05-13 PROCEDURE — 250N000011 HC RX IP 250 OP 636: Performed by: STUDENT IN AN ORGANIZED HEALTH CARE EDUCATION/TRAINING PROGRAM

## 2022-05-13 PROCEDURE — 97530 THERAPEUTIC ACTIVITIES: CPT | Mod: GO

## 2022-05-13 PROCEDURE — 97110 THERAPEUTIC EXERCISES: CPT | Mod: GP

## 2022-05-13 PROCEDURE — 120N000001 HC R&B MED SURG/OB

## 2022-05-13 PROCEDURE — 99232 SBSQ HOSP IP/OBS MODERATE 35: CPT | Performed by: INTERNAL MEDICINE

## 2022-05-13 PROCEDURE — 36415 COLL VENOUS BLD VENIPUNCTURE: CPT | Performed by: INTERNAL MEDICINE

## 2022-05-13 PROCEDURE — 250N000013 HC RX MED GY IP 250 OP 250 PS 637: Performed by: STUDENT IN AN ORGANIZED HEALTH CARE EDUCATION/TRAINING PROGRAM

## 2022-05-13 PROCEDURE — 97530 THERAPEUTIC ACTIVITIES: CPT | Mod: GP

## 2022-05-13 PROCEDURE — 250N000013 HC RX MED GY IP 250 OP 250 PS 637: Performed by: INTERNAL MEDICINE

## 2022-05-13 PROCEDURE — 84132 ASSAY OF SERUM POTASSIUM: CPT | Performed by: INTERNAL MEDICINE

## 2022-05-13 RX ORDER — POTASSIUM CHLORIDE 1500 MG/1
20 TABLET, EXTENDED RELEASE ORAL ONCE
Status: COMPLETED | OUTPATIENT
Start: 2022-05-13 | End: 2022-05-13

## 2022-05-13 RX ADMIN — AMOXICILLIN AND CLAVULANATE POTASSIUM 1 TABLET: 875; 125 TABLET, FILM COATED ORAL at 08:34

## 2022-05-13 RX ADMIN — ACETAMINOPHEN 650 MG: 325 TABLET ORAL at 17:02

## 2022-05-13 RX ADMIN — AMOXICILLIN AND CLAVULANATE POTASSIUM 1 TABLET: 875; 125 TABLET, FILM COATED ORAL at 20:48

## 2022-05-13 RX ADMIN — LISINOPRIL 20 MG: 20 TABLET ORAL at 08:34

## 2022-05-13 RX ADMIN — OXYCODONE HYDROCHLORIDE 5 MG: 5 TABLET ORAL at 08:34

## 2022-05-13 RX ADMIN — ACETAMINOPHEN 650 MG: 325 TABLET ORAL at 20:48

## 2022-05-13 RX ADMIN — ACETAMINOPHEN 650 MG: 325 TABLET ORAL at 08:34

## 2022-05-13 RX ADMIN — ACETAMINOPHEN 650 MG: 325 TABLET ORAL at 12:43

## 2022-05-13 RX ADMIN — HYDROCHLOROTHIAZIDE 25 MG: 25 TABLET ORAL at 08:34

## 2022-05-13 RX ADMIN — POTASSIUM CHLORIDE 20 MEQ: 1500 TABLET, EXTENDED RELEASE ORAL at 18:06

## 2022-05-13 RX ADMIN — ENOXAPARIN SODIUM 40 MG: 40 INJECTION SUBCUTANEOUS at 06:07

## 2022-05-13 ASSESSMENT — ACTIVITIES OF DAILY LIVING (ADL)
ADLS_ACUITY_SCORE: 40
ADLS_ACUITY_SCORE: 40
ADLS_ACUITY_SCORE: 38
ADLS_ACUITY_SCORE: 38
ADLS_ACUITY_SCORE: 40
ADLS_ACUITY_SCORE: 40
ADLS_ACUITY_SCORE: 38
ADLS_ACUITY_SCORE: 38
ADLS_ACUITY_SCORE: 40
ADLS_ACUITY_SCORE: 38

## 2022-05-13 NOTE — PLAN OF CARE
Problem: Pain (Orthopaedic Fracture)  Goal: Acceptable Pain Control  Outcome: Ongoing, Progressing  Intervention: Manage Acute Orthopaedic-Related Pain  Recent Flowsheet Documentation  Taken 5/13/2022 2593 by Ashley Abreu RN  Pain Management Interventions: (premedicated for therapy) medication (see MAR)     Problem: Risk for Delirium  Goal: Improved Behavioral Control  Outcome: Ongoing, Progressing   Goal Outcome Evaluation:           Pt does not have pain at rest but pain 4-7 with activity.  Pt pleasant and cooperative

## 2022-05-13 NOTE — PROGRESS NOTES
Care Management Follow Up    Length of Stay (days): 1    Expected Discharge Date: 05/14/2022     Concerns to be Addressed:  Telemetry monitoring, medical progression, pain control, mobility   Patient plan of care discussed at interdisciplinary rounds: Yes    Anticipated Discharge Disposition: Transitional Care     Anticipated Discharge Services:    Anticipated Discharge DME:      Patient/family educated on Medicare website which has current facility and service quality ratings:  Yes  Education Provided on the Discharge Plan:  Per Team  Patient/Family in Agreement with the Plan: yes    Referrals Placed by CM/SW:  TCU (family will provide additional choices)  Private pay costs discussed: transportation costs    Additional Information:  Chart reviewed.  Patient will fracture of superior pubic ramus, left.  Patient seen by Orthopedics on 5/12 - no urgent/emergent surgical intervention needed.  Therapy recommending TCU for discharge.    Received phone call from patient's daughter, Latoya, and son-in-law, Rigoberto.  Both Latoya and Rigoberto have been home with Andrey.  Rigoberto states he is on Day 10 today and will be able to visit patient tomorrow.  Rigoberto reports that family is agreeable with TCU.  Their first choice is [facility] (patient has been there before).  Writer informed that additional choices are needed as [facility]  May not be available and patient may need to go to a different choice.  Writer emailed Rigoberto the [facility] list to [email or web address].  Requested Rigoberto to call back within a couple of hours with choices.   CM will send referrals after Rigoberto calls back.  Discussed transportation and possible out of pocket costs for medical transport.  Rigoberto states he will wait to see how well Crystal is moving and pain level.    Melanie Santos RN

## 2022-05-13 NOTE — PLAN OF CARE
Problem: Plan of Care - These are the overarching goals to be used throughout the patient stay.    Goal: Optimal Comfort and Wellbeing  Outcome: Ongoing, Progressing     Problem: Plan of Care - These are the overarching goals to be used throughout the patient stay.    Goal: Absence of Hospital-Acquired Illness or Injury  Outcome: Ongoing, Progressing  Intervention: Identify and Manage Fall Risk  Recent Flowsheet Documentation  Taken 5/12/2022 1808 by Gavin Jamison RN  Safety Promotion/Fall Prevention:    activity supervised    bed alarm on    chair alarm on    lighting adjusted    mobility aid in reach    nonskid shoes/slippers when out of bed    patient and family education    room door open    safety round/check completed     Problem: Pain (Orthopaedic Fracture)  Goal: Acceptable Pain Control  Outcome: Ongoing, Progressing     Problem: Functional Ability Impaired (Orthopaedic Fracture)  Goal: Optimal Functional Ability  Outcome: Ongoing, Not Progressing     Pt alert and oriented. Denies pain while sitting or lying still. Denied chest pain, headache, dizziness, lightheadedness, SOB, dyspnea, nausea, vomiting, numbness, or tingling. Pt did show trigeminy at approximately 1600. MD notified and ordered an echocardiogram. Pt continues on cardiac monitoring. BMP and CBC w/ diff in AM of 5/13/2022. Pt currently sitting up in bed and talking over the phone. Pt asymptomatic. Current tele showing sinus rhythm w/ bigeminy at 1830. Pt receiving scheduled PO acetaminophen QID. Call light is within reach.

## 2022-05-13 NOTE — PROGRESS NOTES
[facility]    Medicine Progress Note - Hospitalist Service    Date of Admission:  5/11/2022    Assessment & Plan        Crystal Ibarra is a 86 year old female with a history of SAH and HTN was admitted May 11, 2022 with a left superior and inferior pubic rami hip fracture following a mechanical fall.    Acute left pelvic ring fracture  Left hip pain  Mechanical fall   -Patient sustained a fall on May 11, feet got entangled, tripped and fell backward  -Sustained significant left hip pain, worse with movement  -CT pelvis consistent with a lateral compression type I left pelvic ring fracture, with a small left pelvic sidewall hematoma.  -Orthopedics consulted, no urgent or emergent surgical intervention indicated, they signed off 5/12  -PT OT evaluation  -Pain control: patient reports no pain at resting. Scheduled tylenol and oxycodone prn.  -Weight bearing as tolerated.     # Essential hypertension:   - BP controlled.   - Resume PTA hydrochlorothiazide and lisinopril.    # Hypoxia  # Resolving community-acquired pneumonia/bronchitis  -Seen by PCP May 9 , with 3 weeks of cough, treated for possible bronchitis  -Started Augmentin May 9, will continue here to complete a 5-day course  - In the ED, patient requiring 2 L of oxygen to keep sats over 92%  - X-ray with mild bibasilar fibrosis and scarring in the right middle lobe  - Patient non tachycardic at this time, Wells score 1.5.  Low risk for PE at this time  - Hypoxia may be related to side effects of opioids  - Wean off as able, continue to monitor. Goal SpO2 92% or greater.  - push IS, PT/OT, OOB as able.    - if O2 requirement persists, would obtain CTA chest to rule out PE.     Frequent PVCs/bigeminy  -Seen on telemetry in the ED.   -Patient is asymptomatic  -Potassium 3.8, mag 1.8  -echo reviewed, 2+ TR, some LA dilatation otherwise normal LV and RV function.     Hypercalcemia: Ca = 10.2 mg/dL (Ref range: 8.5 - 10.5 mg/dL) and/or iCa =  N/A on admission, will monitor as appropriate     Diet: Regular Diet Adult    DVT Prophylaxis: LMWH daily.   Cruz Catheter: Not present  Central Lines: None  Cardiac Monitoring: ACTIVE order. Indication: New bihemy and hypoxia  Code Status: Full Code      Disposition Plan   Expected Discharge: 05/13/2022     Anticipated discharge location: inpatient rehabilitation facility    Delays: PT / OT dispo recommendations, resolution of hypoxia    The patient's care was discussed with the Bedside Nurse and patient    Rl Briones MD  Hospitalist Service  [facility]  Securely message with the Vocera Web Console (learn more here)  Text page via [facility] Paging/Directory              ______________________________________________________________________    Interval History   Doing OK  Pain is controlled with current pain med regimen.  PT recommending TCU.     Data reviewed today: I reviewed all medications, new labs and imaging results over the last 24 hours.     Physical Exam   Vital Signs: Temp: 98.4  F (36.9  C) Temp src: Oral BP: 133/69 Pulse: 50   Resp: 18 SpO2: 94 % O2 Device: Nasal cannula Oxygen Delivery: 2.5 LPM  Weight: 94 lbs 14.4 oz    General: AAOx3, NAD  HEENT: Oral mucosa moist and non-erythematous, PERRLA, EOM intact  CV: RRR, normal S1S2, no murmur, clicks, rubs  Resp: Clear to auscultation bilaterally, no wheezes, rhonchi  Abd: Soft, non-tender, BS+, no masses appreciated  Extremities: Radial and pedal pulses intact and symmetric, no pedal edema  Neuro: No lateralizing symptoms or focal neurologic deficits      Data   Recent Labs   Lab 05/13/22  0701 05/11/22  2312 05/11/22  1350   WBC 9.5  --   --    HGB 12.4  --   --    MCV 94  --   --      --   --    *  --  137   POTASSIUM 3.4* 3.8 4.6   CHLORIDE 99  --  101   CO2 23  --  25   BUN 26  --  27   CR 0.78  --  0.83   ANIONGAP 11  --  11   HARJINDER 8.9  --  10.2     --  98     Recent Results (from the past 24 hour(s))    Echocardiogram Complete   Result Value    LVEF  60-65%    Narrative    [id number]  [id number]  [id number]  [id number]^NYOBEY^CALVIN^CROW     [id number] [place] [address]     Name: MIQUEL MORRISON  MRN: [id number]  : 1935  Study Date: 2022 04:13 PM  Age: 86 yrs  Gender: Female  Patient Location: [facility] [address]  Reason For Study: Abn EKG  Ordering Physician: CALVIN GOLDMAN  Performed By: ACE     BSA: 1.3 m2  Height: 59 in  Weight: 94 lb  HR: 42  BP: 100/52 mmHg  ______________________________________________________________________________  Procedure  Complete Echo Adult. There is no comparison study available. Technically  difficult, suboptimal study.  ______________________________________________________________________________  Interpretation Summary     1.Left ventricular size, wall motion and function are normal. The ejection  fraction is 60-65%.  2.Normal right ventricle size and systolic function.  3.The left atrium is mild to moderately dilated. The right atrium is mildly  dilated.  4.There is moderate (2+) tricuspid regurgitation.  5.Technically difficult, suboptimal study due to patient cooperation.  There is no comparison study available.  ______________________________________________________________________________  I      WMSI = 1.00     % Normal = 100     X - Cannot   0 -                      (2) - Mildly 2 -          Segments  Size  Interpret    Hyperkinetic 1 - Normal  Hypokinetic  Hypokinetic  1-2     small                                                     7 -          3-5      moderate  3 - Akinetic 4 -          5 -         6 - Akinetic Dyskinetic   6-14    large               Dyskinetic   Aneurysmal  w/scar       w/scar       15-16   diffuse     Left Ventricle  Left ventricular size, wall motion and function are normal. The ejection  fraction is 60-65%. There is normal left ventricular wall thickness. Left  ventricular diastolic function is normal. No  regional wall motion  abnormalities noted.     Right Ventricle  Normal right ventricle size and systolic function.     Atria  The left atrium is mild to moderately dilated. The right atrium is mildly  dilated. There is no color Doppler evidence of an atrial shunt.     Mitral Valve  There is moderate mitral annular calcification. The mitral valve leaflets  appear thickened, but open well. There is trace mitral regurgitation. There is  no mitral valve stenosis.     Tricuspid Valve  The tricuspid valve is not well visualized, but is grossly normal. There is  moderate (2+) tricuspid regurgitation. There is no tricuspid stenosis.     Aortic Valve  The aortic valve is trileaflet. Thickened aortic valve leaflets. No aortic  regurgitation is present. No aortic stenosis is present.     Pulmonic Valve  The pulmonic valve is not well seen, but is grossly normal. This degree of  valvular regurgitation is within normal limits. There is no pulmonic valvular  stenosis.     Vessels  The aorta root is normal. Normal size ascending aorta. IVC diameter <2.1 cm  collapsing >50% with sniff suggests a normal RA pressure of 3 mmHg.     Pericardium  There is no pericardial effusion.     Rhythm  Sinus rhythm was noted.     ______________________________________________________________________________  MMode/2D Measurements & Calculations  IVSd: 0.79 cm  LVIDd: 3.8 cm  LVIDs: 2.3 cm  LVPWd: 0.68 cm  FS: 40.0 %     LV mass(C)d: 77.5 grams  LV mass(C)dI: 58.0 grams/m2  Ao root diam: 3.0 cm  LA dimension: 3.2 cm  LA/Ao: 1.1  LVOT diam: 2.2 cm  LVOT area: 3.9 cm2  LA Volume Indexed (AL/bp): 40.3 ml/m2  RWT: 0.35     Time Measurements  MM HR: 56.0 BPM     ______________________________________________________________________________  Report approved by: Mckenna Escalante 05/12/2022 04:57 PM

## 2022-05-14 LAB
ANION GAP SERPL CALCULATED.3IONS-SCNC: 13 MMOL/L (ref 5–18)
BUN SERPL-MCNC: 28 MG/DL (ref 8–28)
CALCIUM SERPL-MCNC: 8.8 MG/DL (ref 8.5–10.5)
CHLORIDE BLD-SCNC: 97 MMOL/L (ref 98–107)
CO2 SERPL-SCNC: 23 MMOL/L (ref 22–31)
CREAT SERPL-MCNC: 0.76 MG/DL (ref 0.6–1.1)
D DIMER PPP FEU-MCNC: 1.79 UG/ML FEU (ref 0–0.5)
GFR SERPL CREATININE-BSD FRML MDRD: 76 ML/MIN/1.73M2
GLUCOSE BLD-MCNC: 100 MG/DL (ref 70–125)
HOLD SPECIMEN: NORMAL
POTASSIUM BLD-SCNC: 3.7 MMOL/L (ref 3.5–5)
POTASSIUM BLD-SCNC: 3.7 MMOL/L (ref 3.5–5)
SODIUM SERPL-SCNC: 133 MMOL/L (ref 136–145)

## 2022-05-14 PROCEDURE — 36415 COLL VENOUS BLD VENIPUNCTURE: CPT | Performed by: INTERNAL MEDICINE

## 2022-05-14 PROCEDURE — 250N000013 HC RX MED GY IP 250 OP 250 PS 637: Performed by: INTERNAL MEDICINE

## 2022-05-14 PROCEDURE — 85379 FIBRIN DEGRADATION QUANT: CPT | Performed by: INTERNAL MEDICINE

## 2022-05-14 PROCEDURE — 250N000011 HC RX IP 250 OP 636: Performed by: STUDENT IN AN ORGANIZED HEALTH CARE EDUCATION/TRAINING PROGRAM

## 2022-05-14 PROCEDURE — 84132 ASSAY OF SERUM POTASSIUM: CPT | Performed by: INTERNAL MEDICINE

## 2022-05-14 PROCEDURE — 120N000001 HC R&B MED SURG/OB

## 2022-05-14 PROCEDURE — 250N000013 HC RX MED GY IP 250 OP 250 PS 637: Performed by: STUDENT IN AN ORGANIZED HEALTH CARE EDUCATION/TRAINING PROGRAM

## 2022-05-14 PROCEDURE — 99232 SBSQ HOSP IP/OBS MODERATE 35: CPT | Performed by: INTERNAL MEDICINE

## 2022-05-14 PROCEDURE — 80048 BASIC METABOLIC PNL TOTAL CA: CPT | Performed by: INTERNAL MEDICINE

## 2022-05-14 RX ADMIN — OXYCODONE HYDROCHLORIDE 5 MG: 5 TABLET ORAL at 09:46

## 2022-05-14 RX ADMIN — HYDROCHLOROTHIAZIDE 25 MG: 25 TABLET ORAL at 08:50

## 2022-05-14 RX ADMIN — ACETAMINOPHEN 650 MG: 325 TABLET ORAL at 08:50

## 2022-05-14 RX ADMIN — ENOXAPARIN SODIUM 40 MG: 40 INJECTION SUBCUTANEOUS at 05:21

## 2022-05-14 RX ADMIN — LISINOPRIL 20 MG: 20 TABLET ORAL at 08:50

## 2022-05-14 RX ADMIN — AMOXICILLIN AND CLAVULANATE POTASSIUM 1 TABLET: 875; 125 TABLET, FILM COATED ORAL at 08:50

## 2022-05-14 RX ADMIN — ACETAMINOPHEN 650 MG: 325 TABLET ORAL at 17:02

## 2022-05-14 RX ADMIN — ACETAMINOPHEN 650 MG: 325 TABLET ORAL at 21:17

## 2022-05-14 RX ADMIN — ACETAMINOPHEN 650 MG: 325 TABLET ORAL at 13:56

## 2022-05-14 ASSESSMENT — ACTIVITIES OF DAILY LIVING (ADL)
ADLS_ACUITY_SCORE: 40
ADLS_ACUITY_SCORE: 44
ADLS_ACUITY_SCORE: 40
ADLS_ACUITY_SCORE: 44
ADLS_ACUITY_SCORE: 40
ADLS_ACUITY_SCORE: 43

## 2022-05-14 NOTE — PLAN OF CARE
Goal Outcome Evaluation:      Problem: Fracture Stabilization and Management (Orthopaedic Fracture)  Goal: Fracture Stability  Outcome: Ongoing, Progressing     Problem: Pain (Orthopaedic Fracture)  Goal: Acceptable Pain Control  Outcome: Ongoing, Progressing     Pt A&Ox4. Denies pain when not moving but states pain with movement or turns. Purwick in place and changed during shift.     Priscilla Shepard RN

## 2022-05-14 NOTE — PROVIDER NOTIFICATION
Dr. Briones notified about pt's potassium of 3.4. Pt's tele continued to show sinus rhythm w/ trigeminy as of yesterday. Pt asymptomatic at the time. New order for potassium protocol placed by MD.

## 2022-05-14 NOTE — PROGRESS NOTES
[facility]  Hospitalist Progress Note    Admit Date:  5/11/2022  Date of Service (when I saw the patient): 05/14/2022   Provider:  Quyen Quevedo-Ernesto, DO    Assessment & Plan   Crystal Ibarra is a 86 year old female with a history of SAH and HTN was admitted May 11, 2022 with a left superior and inferior pubic rami hip fracture following a mechanical fall.    Problem List:     1. Acute left pelvic ring fracture  Left hip pain  Mechanical fall   -Patient sustained a fall on May 11, feet got entangled, tripped and fell backward  -Sustained significant left hip pain, worse with movement  -CT pelvis consistent with a lateral compression type I left pelvic ring fracture, with a small left pelvic sidewall hematoma.  -Orthopedics consulted, no urgent or emergent surgical intervention indicated, they signed off 5/12  -PT OT evaluation - recommending TCU  -Pain control: Scheduled tylenol; try to limit narcotics d/t confusion (has not used oxycodone in the last 24 hours)  -Weight bearing as tolerated.      2.  Essential hypertension:   - -110's  Will hold on further hydrochlorothiazide, for now, d/t hyponatremia  Continue lisinopril with BP parameters (hold if <120)  PTA dose is lisinopril 20mg/day    3.  Acute hypoxic respiratory failure     Resolving community-acquired pneumonia/bronchitis  -Seen by PCP 5/9/22, with 3 weeks of cough, treated for possible bronchitis  -Started Augmentin May 9, will continue here to complete a 7-day course  She has still been requiring 2-2.5L oxygen  IS at bedside as able  Will check d dimer  CT of the chest later today if elevated    4. Frequent PVCs/bigeminy  -Seen on telemetry in the ED  -Patient is asymptomatic  -echo reviewed, 2+ TR, some LA dilatation otherwise normal LV and RV function.    continue on tele today and if no significant further arrythmias can discharge  Magnesium wnl; potassium WNL this am    5.  Hyponartemia  Mild on lab  "5/13/22  Awaiting BMP from this am  As above, hold of hydrochlorothiazide, for now     Diet: Regular Diet Adult    DVT Prophylaxis: LMWH daily.   Cruz Catheter: Not present  Central Lines: None  Cardiac Monitoring: ACTIVE order. Indication: New bihemy and hypoxia  Code Status: Full Code      Diet: Regular Diet Adult    DVT Prophylaxis: Enoxaparin (Lovenox) SQ  Cruz Catheter: Not present  Code Status: Full Code      Disposition Plan   Expected Discharge: 05/14/2022     Anticipated discharge location: inpatient rehabilitation facility    Delays:     Placement - TCU         Entered: Quyen Wilson,  05/14/2022, 7:46 AM       The patient's care was discussed with the Bedside Nurse and Patient.    We are operating under sub optimal conditions in the setting of a world wide pandemic, hospitals are running at full capacity with limited bed availability. We are providing the best possible patient care with limited resources.    Interval History   Pt seen and examined.  Feeling better this am.  No pain in her leg or pelvis at this time.  No CP, SOB this am; cough has improved.  No F/C, N/V or HA.  \"I need rehab\".      -Data reviewed today: I reviewed all new labs and imaging results over the last 24 hours. I personally reviewed no images or EKG's today.    Physical Exam   Temp: 98.8  F (37.1  C) Temp src: Oral BP: 119/71 Pulse: (!) 47   Resp: 16 SpO2: 95 % O2 Device: Nasal cannula Oxygen Delivery: 2.5 LPM  Vitals:    05/11/22 1100 05/12/22 1219   Weight: 43.1 kg (95 lb) 43 kg (94 lb 14.4 oz)     Vital Signs with Ranges  Temp:  [98.2  F (36.8  C)-98.8  F (37.1  C)] 98.8  F (37.1  C)  Pulse:  [47-65] 47  Resp:  [16-18] 16  BP: (104-133)/(55-73) 119/71  SpO2:  [91 %-95 %] 95 %  I/O last 3 completed shifts:  In: 300 [P.O.:300]  Out: 750 [Urine:750]    GEN:  Alert, appears a little confused trying to answer the phone after hearing a call light from the hallway.  Speech is clear  HEENT:  Normocephalic/atraumatic, no " scleral icterus, no nasal discharge, mouth and membranes appear moist  NECK:  No clear thyromegaly or JVD  CV:  Regular rate and rhythm, no louf murmur to ausc.  S1 + S2 noted, no S3 or S4.  LUNGS:  Clear to auscultation ant/lat bilaterally.  No rales/rhonchi/wheezing auscultated bilaterally.  No costal retractions bilaterally.  Symmetric chest rise on inhalation noted.  ABD:  Active bowel sounds, soft, non-tender/non-distended.  No rebound/guarding/rigidity.  No masses palpated.  No obvious HSM to exam.  EXT:  No significant pretibial edema or cyanosis bilaterally. No joint synovitis noted.  No calf-tenderness or asymmetry noted.  SKIN:  Dry to touch, no rashes or jaundice noted.  PSYCH:  Mood appropriate, Not tearful or depressed.  Cooperative and not agitated  NEURO:  No tremors at rest,  Speech is clear and appropriate.  CN 2-12 grossly intact to testing bilaterally      Data   Labs:  Recent Labs   Lab 05/14/22 0647 05/13/22 2205 05/13/22 0701 05/11/22 2312 05/11/22  1350   NA  --   --  133*  --  137   POTASSIUM 3.7 3.8 3.4*   < > 4.6   CHLORIDE  --   --  99  --  101   CO2  --   --  23  --  25   ANIONGAP  --   --  11  --  11   GLC  --   --  104  --  98   BUN  --   --  26  --  27   CR  --   --  0.78  --  0.83   GFRESTIMATED  --   --  74  --  68   HARJINDER  --   --  8.9  --  10.2    < > = values in this interval not displayed.     Recent Labs   Lab 05/13/22  0701   WBC 9.5   HGB 12.4   HCT 35.8   MCV 94        Recent Labs   Lab 05/14/22 0647 05/13/22 2205 05/13/22 0701 05/11/22 2312 05/11/22  1350   NA  --   --  133*  --  137   POTASSIUM 3.7 3.8 3.4* 3.8 4.6   CHLORIDE  --   --  99  --  101   CO2  --   --  23  --  25   ANIONGAP  --   --  11  --  11   GLC  --   --  104  --  98   BUN  --   --  26  --  27   CR  --   --  0.78  --  0.83   GFRESTIMATED  --   --  74  --  68   HARJINDER  --   --  8.9  --  10.2   MAG  --   --   --  1.8  --       D dimer pending  BMP pending    Recent Imaging:   No results found for  this or any previous visit (from the past 24 hour(s)).    Medications       acetaminophen  650 mg Oral 4x Daily     amoxicillin-clavulanate  1 tablet Oral BID     enoxaparin ANTICOAGULANT  40 mg Subcutaneous Q24H     hydrochlorothiazide  25 mg Oral Daily     lisinopril  20 mg Oral Daily

## 2022-05-14 NOTE — PLAN OF CARE
Problem: Pain (Orthopaedic Fracture)  Goal: Acceptable Pain Control  Outcome: Ongoing, Progressing  Intervention: Manage Acute Orthopaedic-Related Pain  Recent Flowsheet Documentation  Taken 5/14/2022 0991 by Ashley Abreu, RN  Pain Management Interventions: medication (see MAR)   Goal Outcome Evaluation:        Pain is 0-3 at rest, and increases 6-8 with activity.  Received Oxycodone x 1 this morning, but now has been discontinued.  Receives Tylenol scheduled.

## 2022-05-14 NOTE — PLAN OF CARE
Problem: Plan of Care - These are the overarching goals to be used throughout the patient stay.    Goal: Optimal Comfort and Wellbeing  Outcome: Ongoing, Progressing     Problem: Plan of Care - These are the overarching goals to be used throughout the patient stay.    Goal: Absence of Hospital-Acquired Illness or Injury  Outcome: Ongoing, Progressing  Intervention: Identify and Manage Fall Risk  Recent Flowsheet Documentation  Taken 5/13/2022 1704 by Gavin Jamison RN  Safety Promotion/Fall Prevention:    activity supervised    bed alarm on    lighting adjusted    mobility aid in reach    nonskid shoes/slippers when out of bed    patient and family education    room door open    safety round/check completed    supervised activity     Problem: Functional Ability Impaired (Orthopaedic Fracture)  Goal: Optimal Functional Ability  Outcome: Ongoing, Progressing     Problem: Pain (Orthopaedic Fracture)  Goal: Acceptable Pain Control  Outcome: Ongoing, Progressing     Pt alert and oriented. Denies pain at rest. Pt does state having pain during repositioning, but relieves at rest. O2 sat 95% on 2.5 L via NC. Pt given potassium tablet for replacement; K+ lab to be drawn at 2200. Tele continues to show sinus rhythm w/ trigeminy. HR in the low 60's. Pt asymptomatic at this time. Pt assist of 1-2 and is WBAT. Pt on regular diet. Call light is within reach.

## 2022-05-15 ENCOUNTER — APPOINTMENT (OUTPATIENT)
Dept: OCCUPATIONAL THERAPY | Facility: HOSPITAL | Age: 87
DRG: 535 | End: 2022-05-15
Payer: COMMERCIAL

## 2022-05-15 ENCOUNTER — APPOINTMENT (OUTPATIENT)
Dept: CT IMAGING | Facility: HOSPITAL | Age: 87
DRG: 535 | End: 2022-05-15
Attending: HOSPITALIST
Payer: COMMERCIAL

## 2022-05-15 LAB — POTASSIUM BLD-SCNC: 3.6 MMOL/L (ref 3.5–5)

## 2022-05-15 PROCEDURE — 120N000001 HC R&B MED SURG/OB

## 2022-05-15 PROCEDURE — 250N000013 HC RX MED GY IP 250 OP 250 PS 637: Performed by: STUDENT IN AN ORGANIZED HEALTH CARE EDUCATION/TRAINING PROGRAM

## 2022-05-15 PROCEDURE — 84132 ASSAY OF SERUM POTASSIUM: CPT | Performed by: INTERNAL MEDICINE

## 2022-05-15 PROCEDURE — 99233 SBSQ HOSP IP/OBS HIGH 50: CPT | Performed by: HOSPITALIST

## 2022-05-15 PROCEDURE — 97110 THERAPEUTIC EXERCISES: CPT | Mod: GO

## 2022-05-15 PROCEDURE — 36415 COLL VENOUS BLD VENIPUNCTURE: CPT | Performed by: INTERNAL MEDICINE

## 2022-05-15 PROCEDURE — 71275 CT ANGIOGRAPHY CHEST: CPT

## 2022-05-15 PROCEDURE — 97535 SELF CARE MNGMENT TRAINING: CPT | Mod: GO

## 2022-05-15 PROCEDURE — 250N000011 HC RX IP 250 OP 636: Performed by: INTERNAL MEDICINE

## 2022-05-15 PROCEDURE — 250N000013 HC RX MED GY IP 250 OP 250 PS 637: Performed by: INTERNAL MEDICINE

## 2022-05-15 PROCEDURE — 250N000011 HC RX IP 250 OP 636: Performed by: STUDENT IN AN ORGANIZED HEALTH CARE EDUCATION/TRAINING PROGRAM

## 2022-05-15 RX ORDER — IOPAMIDOL 755 MG/ML
100 INJECTION, SOLUTION INTRAVASCULAR ONCE
Status: COMPLETED | OUTPATIENT
Start: 2022-05-15 | End: 2022-05-15

## 2022-05-15 RX ORDER — LISINOPRIL 20 MG/1
40 TABLET ORAL DAILY
Status: DISCONTINUED | OUTPATIENT
Start: 2022-05-16 | End: 2022-05-17 | Stop reason: HOSPADM

## 2022-05-15 RX ADMIN — LISINOPRIL 20 MG: 20 TABLET ORAL at 08:55

## 2022-05-15 RX ADMIN — ACETAMINOPHEN 650 MG: 325 TABLET ORAL at 21:00

## 2022-05-15 RX ADMIN — ACETAMINOPHEN 650 MG: 325 TABLET ORAL at 08:55

## 2022-05-15 RX ADMIN — ENOXAPARIN SODIUM 40 MG: 40 INJECTION SUBCUTANEOUS at 05:05

## 2022-05-15 RX ADMIN — ACETAMINOPHEN 650 MG: 325 TABLET ORAL at 16:06

## 2022-05-15 RX ADMIN — ACETAMINOPHEN 650 MG: 325 TABLET ORAL at 13:43

## 2022-05-15 RX ADMIN — IOPAMIDOL 100 ML: 755 INJECTION, SOLUTION INTRAVENOUS at 11:03

## 2022-05-15 ASSESSMENT — ACTIVITIES OF DAILY LIVING (ADL)
ADLS_ACUITY_SCORE: 43
ADLS_ACUITY_SCORE: 43
ADLS_ACUITY_SCORE: 41
ADLS_ACUITY_SCORE: 44
ADLS_ACUITY_SCORE: 43
ADLS_ACUITY_SCORE: 41
ADLS_ACUITY_SCORE: 43
ADLS_ACUITY_SCORE: 44

## 2022-05-15 NOTE — PLAN OF CARE
Problem: Fracture Stabilization and Management (Orthopaedic Fracture)  Goal: Fracture Stability  Outcome: Ongoing, Progressing     Problem: Pain (Orthopaedic Fracture)  Goal: Acceptable Pain Control  Outcome: Ongoing, Progressing  Intervention: Manage Acute Orthopaedic-Related Pain  Recent Flowsheet Documentation  Taken 5/15/2022 0855 by Ashley Abreu, RN  Pain Management Interventions: medication (see MAR)   Goal Outcome Evaluation:        Pt alert and orientated x 4, but sometimes forgetful.  Does not want to be a bother to staff.  No pain while resting in bed, but pain of 6-8 with activity

## 2022-05-15 NOTE — CONSULTS
Care Management Follow Up    Length of Stay (days): 3    Expected Discharge Date: 05/16/2022     Concerns to be Addressed:     Placement   Patient plan of care discussed at interdisciplinary rounds: Yes    Anticipated Discharge Disposition: Transitional Care     Anticipated Discharge Services:  Ongoing therapies and nursing care  Anticipated Discharge DME:  Per TCU    Patient/family educated on Medicare website which has current facility and service quality ratings:  Yes  Education Provided on the Discharge Plan:  Per team  Patient/Family in Agreement with the Plan: yes    Referrals Placed by CM/SW:  See below  Private pay costs discussed: Not applicable    Additional Information:  SW reviewed chart and spoke with interdisciplinary team. Per team, pt is medically cleared for discharge when placement found. PT/OT recommending TCU vs return home w/ home care; pt/family agreeable with TCU.     Prior to admission, pt lives at [facility] alone in independent living apartment without services; daughter Latoya assists with housekeeping, laundry, shopping, and transport, however Latoya is currently on isolation due to COVID. Family would prefer placement at [facility] as pt has been there before however also agreeable with other referrals below. SW followed-up on referrals and will discuss options with pt and family when known. Anticipate [facility] wheelchair transport at discharge.     TCU Referrals:   - [facility] (1st choice) - 5/15: will re-review and notify SW if they have availability for 5/17, no anticipated opening on 5/16  - [facility] - 5/15: LVM  - [facility] - 5/15: LVM  - [facility] - 5/15: LVM    SW will continue to assess and follow for discharge planning needs.     Juliana CARRION LICSW

## 2022-05-15 NOTE — PROGRESS NOTES
[facility]  Hospitalist Progress Note    Admit Date:  5/11/2022  Date of Service (when I saw the patient): 05/15/2022   Provider:  Quyen Quevedo-Ernesto, DO    Assessment & Plan   Crystal Ibarra is a 86 year old female with a history of SAH and HTN was admitted May 11, 2022 with a left superior and inferior pubic rami hip fracture following a mechanical fall.    Problem List:     1. Acute left pelvic ring fracture  Left hip pain  Mechanical fall   -Patient sustained a fall on May 11, feet got entangled, tripped and fell backward  -Sustained significant left hip pain, worse with movement  -CT pelvis consistent with a lateral compression type I left pelvic ring fracture, with a small left pelvic sidewall hematoma.  -Orthopedics consulted, no urgent or emergent surgical intervention indicated, they signed off 5/12  -PT OT evaluation - recommending TCU  -Pain control: Scheduled tylenol; try to limit narcotics d/t confusion   -Weight bearing as tolerated.      2.  Essential hypertension: currently elevated  -Will hold on further hydrochlorothiazide, for now, d/t hyponatremia, recheck labs in AM  -Continue lisinopril with BP parameters (hold if <120)  -PTA dose is lisinopril 20mg daily, increase to 40mg today    3.  Acute hypoxic respiratory failure     Resolving community-acquired pneumonia/bronchitis  -Seen by PCP 5/9/22, with 3 weeks of cough, treated for possible bronchitis  -Completed Augmentin 7 days (out-pt/in-pt duration).  She has still been requiring 2-2.5L oxygen  IS at bedside as able  D-dimer slightly up, check Ct chest for PE run.       4. Frequent PVCs/bigeminy  -Seen on telemetry in the ED  -Patient is asymptomatic  -Echo reviewed, 2+ TR, some LA dilatation otherwise normal LV and RV function.   -Discontinue tele today  Magnesium wnl; potassium WNL this am    5.  Hyponatremia  Mild on lab 5/14/22. Recheck tomorrow  As above, hold of hydrochlorothiazide, for now        Diet: Regular  Diet Adult    DVT Prophylaxis: Enoxaparin (Lovenox) SQ  Cruz Catheter: Not present  Code Status: Full Code      Disposition Plan   Expected Discharge: 05/15/2022     Anticipated discharge location: inpatient rehabilitation facility    Delays:     Placement - TCU         Entered: Mahi Tipton MD 05/15/2022, 9:36 AM       The patient's care was discussed with patient      Interval History   .  Patient is new to me. Chart reviewed and events noted.  Patient seen and examined.   Sitting in chair. Denies any pain this morning. No acute issues overnight. No SOB, chest pain, abd pain, N/V, fever.    -Data reviewed today: I reviewed all new labs and imaging results over the last 24 hours.    Physical Exam   Temp: 98.4  F (36.9  C) Temp src: Oral BP: (!) 150/72 Pulse: 50   Resp: 17 SpO2: 90 % O2 Device: Nasal cannula Oxygen Delivery: 2.5 LPM  Vitals:    05/11/22 1100 05/12/22 1219   Weight: 43.1 kg (95 lb) 43 kg (94 lb 14.4 oz)     Vital Signs with Ranges  Temp:  [97.4  F (36.3  C)-98.7  F (37.1  C)] 98.4  F (36.9  C)  Pulse:  [50-83] 50  Resp:  [16-18] 17  BP: (118-165)/(65-85) 150/72  SpO2:  [90 %-95 %] 90 %  I/O last 3 completed shifts:  In: 100 [P.O.:100]  Out: -     GEN:  Alert,awake, NAD  HEENT:  Normocephalic/atraumatic, no scleral icterus, no nasal discharge, mouth and membranes appear moist  CV:  Regular rate and rhythm, S1 + S2 note  LUNGS:  Clear to auscultation ant/lat bilaterally.  No rales/rhonchi/wheezing auscultated bilaterally.    ABD:  Active bowel sounds, soft, non-tender/non-distended.   EXT:  No significant pretibial edema or cyanosis bilaterally.  SKIN:  Dry to touch, no rashes or jaundice noted.  PSYCH:  Mood appropriate, Not tearful or depressed.  Cooperative and not agitated  NEURO:  No tremors at rest,  Speech is clear and appropriate.  CN 2-12 grossly intact to testing bilaterally      Data   Labs:  Recent Labs   Lab 05/15/22  0641 05/14/22  0647 05/13/22  2205 05/13/22  0701 05/11/22  2313  05/11/22  1350   NA  --  133*  --  133*  --  137   POTASSIUM 3.6 3.7  3.7 3.8 3.4*   < > 4.6   CHLORIDE  --  97*  --  99  --  101   CO2  --  23  --  23  --  25   ANIONGAP  --  13  --  11  --  11   GLC  --  100  --  104  --  98   BUN  --  28  --  26  --  27   CR  --  0.76  --  0.78  --  0.83   GFRESTIMATED  --  76  --  74  --  68   HARJINDER  --  8.8  --  8.9  --  10.2    < > = values in this interval not displayed.     Recent Labs   Lab 05/13/22  0701   WBC 9.5   HGB 12.4   HCT 35.8   MCV 94        Recent Labs   Lab 05/15/22  0641 05/14/22  0647 05/13/22  2205 05/13/22  0701 05/11/22  2312 05/11/22  1350   NA  --  133*  --  133*  --  137   POTASSIUM 3.6 3.7  3.7 3.8 3.4* 3.8 4.6   CHLORIDE  --  97*  --  99  --  101   CO2  --  23  --  23  --  25   ANIONGAP  --  13  --  11  --  11   GLC  --  100  --  104  --  98   BUN  --  28  --  26  --  27   CR  --  0.76  --  0.78  --  0.83   GFRESTIMATED  --  76  --  74  --  68   HARJINDER  --  8.8  --  8.9  --  10.2   MAG  --   --   --   --  1.8  --           Recent Imaging:   No results found for this or any previous visit (from the past 24 hour(s)).    Medications       acetaminophen  650 mg Oral 4x Daily     enoxaparin ANTICOAGULANT  40 mg Subcutaneous Q24H     [Held by provider] hydrochlorothiazide  25 mg Oral Daily     lisinopril  20 mg Oral Daily

## 2022-05-15 NOTE — PLAN OF CARE
Problem: Functional Ability Impaired (Orthopaedic Fracture)  Goal: Optimal Functional Ability  Outcome: Ongoing, Progressing  Intervention: Optimize Functional Ability  Recent Flowsheet Documentation  Taken 5/15/2022 0500 by Gabrielle Machuca RN  Activity Management: activity adjusted per tolerance  Activity Assistance Provided: assistance, 1 person  Taken 5/15/2022 0045 by Gabrielle Machuca RN  Activity Management: activity adjusted per tolerance  Activity Assistance Provided: assistance, 2 people  Positioning/Transfer Devices:   pillows   in use     Problem: Pain (Orthopaedic Fracture)  Goal: Acceptable Pain Control  Outcome: Ongoing, Progressing   Goal Outcome Evaluation:    Pt had a restful night.  Reports pain with repositioning but pain free when at rest.  Declined pain meds.  Telemetry was NSR with 1st degree AV block and 2nd check pt had bigeminy.  VSS.  Voided in good amount.  Pt requires some assistance with repositioning.

## 2022-05-15 NOTE — PLAN OF CARE
"Problem: Plan of Care - These are the overarching goals to be used throughout the patient stay.    Goal: Optimal Comfort and Wellbeing  Outcome: Ongoing, Progressing     Problem: Plan of Care - These are the overarching goals to be used throughout the patient stay.    Goal: Absence of Hospital-Acquired Illness or Injury  Outcome: Ongoing, Progressing  Intervention: Identify and Manage Fall Risk  Recent Flowsheet Documentation  Taken 5/14/2022 1703 by Gavin Jamison RN  Safety Promotion/Fall Prevention:    activity supervised    bed alarm on    lighting adjusted    mobility aid in reach    nonskid shoes/slippers when out of bed    patient and family education    room door open    supervised activity     Problem: Functional Ability Impaired (Orthopaedic Fracture)  Goal: Optimal Functional Ability  Outcome: Ongoing, Progressing     Problem: Pain (Orthopaedic Fracture)  Goal: Acceptable Pain Control  Outcome: Ongoing, Progressing     Pt alert and oriented. Denies pain at rest, but c/o pain during repositioning/transfers. Pt assist of 1 w/ walker to commode. Tele showing sinus rhythm w/ frequent PVC's as of 1857. Potassium of 3.7; recheck in AM. Pt had an episode of confusion at approximately 2045. Pt called her friend to have her be picked up. Staff went into pt's room and assessed pt. Pt was alert and oriented x4 at that time. Pt was aware that she was acting \"strange\" and feels better now. Pt stated she does not want to take oxycodone. Oxycodone has been discontinued since AM shift. Pt has only received scheduled acetaminophen at this time. No further episode of confusion thus far. Call light is within reach.  "

## 2022-05-16 ENCOUNTER — APPOINTMENT (OUTPATIENT)
Dept: OCCUPATIONAL THERAPY | Facility: HOSPITAL | Age: 87
DRG: 535 | End: 2022-05-16
Payer: COMMERCIAL

## 2022-05-16 LAB
ANION GAP SERPL CALCULATED.3IONS-SCNC: 12 MMOL/L (ref 5–18)
BUN SERPL-MCNC: 24 MG/DL (ref 8–28)
CALCIUM SERPL-MCNC: 9.1 MG/DL (ref 8.5–10.5)
CHLORIDE BLD-SCNC: 96 MMOL/L (ref 98–107)
CO2 SERPL-SCNC: 25 MMOL/L (ref 22–31)
CREAT SERPL-MCNC: 0.75 MG/DL (ref 0.6–1.1)
ERYTHROCYTE [DISTWIDTH] IN BLOOD BY AUTOMATED COUNT: 13.4 % (ref 10–15)
GFR SERPL CREATININE-BSD FRML MDRD: 77 ML/MIN/1.73M2
GLUCOSE BLD-MCNC: 89 MG/DL (ref 70–125)
HCT VFR BLD AUTO: 37.9 % (ref 35–47)
HGB BLD-MCNC: 13.3 G/DL (ref 11.7–15.7)
MCH RBC QN AUTO: 32.6 PG (ref 26.5–33)
MCHC RBC AUTO-ENTMCNC: 35.1 G/DL (ref 31.5–36.5)
MCV RBC AUTO: 93 FL (ref 78–100)
PLATELET # BLD AUTO: 328 10E3/UL (ref 150–450)
POTASSIUM BLD-SCNC: 3.5 MMOL/L (ref 3.5–5)
RBC # BLD AUTO: 4.08 10E6/UL (ref 3.8–5.2)
SARS-COV-2 RNA RESP QL NAA+PROBE: NEGATIVE
SODIUM SERPL-SCNC: 133 MMOL/L (ref 136–145)
WBC # BLD AUTO: 9.5 10E3/UL (ref 4–11)

## 2022-05-16 PROCEDURE — 250N000013 HC RX MED GY IP 250 OP 250 PS 637: Performed by: HOSPITALIST

## 2022-05-16 PROCEDURE — 97535 SELF CARE MNGMENT TRAINING: CPT | Mod: GO

## 2022-05-16 PROCEDURE — 120N000001 HC R&B MED SURG/OB

## 2022-05-16 PROCEDURE — 99232 SBSQ HOSP IP/OBS MODERATE 35: CPT | Performed by: HOSPITALIST

## 2022-05-16 PROCEDURE — 250N000011 HC RX IP 250 OP 636: Performed by: STUDENT IN AN ORGANIZED HEALTH CARE EDUCATION/TRAINING PROGRAM

## 2022-05-16 PROCEDURE — 87635 SARS-COV-2 COVID-19 AMP PRB: CPT | Performed by: HOSPITALIST

## 2022-05-16 PROCEDURE — 80048 BASIC METABOLIC PNL TOTAL CA: CPT | Performed by: HOSPITALIST

## 2022-05-16 PROCEDURE — 97110 THERAPEUTIC EXERCISES: CPT | Mod: GO

## 2022-05-16 PROCEDURE — 85027 COMPLETE CBC AUTOMATED: CPT | Performed by: HOSPITALIST

## 2022-05-16 PROCEDURE — 36415 COLL VENOUS BLD VENIPUNCTURE: CPT | Performed by: HOSPITALIST

## 2022-05-16 PROCEDURE — 250N000013 HC RX MED GY IP 250 OP 250 PS 637: Performed by: STUDENT IN AN ORGANIZED HEALTH CARE EDUCATION/TRAINING PROGRAM

## 2022-05-16 RX ADMIN — ACETAMINOPHEN 650 MG: 325 TABLET ORAL at 13:13

## 2022-05-16 RX ADMIN — ACETAMINOPHEN 650 MG: 325 TABLET ORAL at 21:27

## 2022-05-16 RX ADMIN — ACETAMINOPHEN 650 MG: 325 TABLET ORAL at 08:44

## 2022-05-16 RX ADMIN — LISINOPRIL 40 MG: 20 TABLET ORAL at 08:45

## 2022-05-16 RX ADMIN — ACETAMINOPHEN 650 MG: 325 TABLET ORAL at 17:03

## 2022-05-16 RX ADMIN — ENOXAPARIN SODIUM 40 MG: 40 INJECTION SUBCUTANEOUS at 05:59

## 2022-05-16 ASSESSMENT — ACTIVITIES OF DAILY LIVING (ADL)
ADLS_ACUITY_SCORE: 41
ADLS_ACUITY_SCORE: 44
TRANSFERRING: 0-->ASSISTANCE NEEDED (DEVELOPMENTALLY APPROPRIATE)
WALKING_OR_CLIMBING_STAIRS: AMBULATION DIFFICULTY, ASSISTANCE 1 PERSON
ADLS_ACUITY_SCORE: 42
FALL_HISTORY_WITHIN_LAST_SIX_MONTHS: YES
WALKING_OR_CLIMBING_STAIRS_DIFFICULTY: YES
DRESS: 0-->ASSISTANCE NEEDED (DEVELOPMENTALLY APPROPRIATE)
ADLS_ACUITY_SCORE: 44
DRESSING/BATHING_DIFFICULTY: YES
TOILETING: 0-->NOT TOILET TRAINED OR ASSISTANCE NEEDED (DEVELOPMENTALLY APPROPRIATE)
ADLS_ACUITY_SCORE: 43
TOILETING: 1-->ASSISTANCE (EQUIPMENT/PERSON) NEEDED
DIFFICULTY_EATING/SWALLOWING: NO
ADLS_ACUITY_SCORE: 43
DRESS: 1-->ASSISTANCE (EQUIPMENT/PERSON) NEEDED
BATHING: 1-->ASSISTANCE NEEDED
DRESSING/BATHING_MANAGEMENT: ASSISTANCE
NUMBER_OF_TIMES_PATIENT_HAS_FALLEN_WITHIN_LAST_SIX_MONTHS: 1
ADLS_ACUITY_SCORE: 41
EQUIPMENT_CURRENTLY_USED_AT_HOME: WALKER, ROLLING
DRESSING/BATHING: BATHING DIFFICULTY, ASSISTANCE 1 PERSON
TOILETING_ISSUES: YES
CHANGE_IN_FUNCTIONAL_STATUS_SINCE_ONSET_OF_CURRENT_ILLNESS/INJURY: YES
ADLS_ACUITY_SCORE: 44
TRANSFERRING: 1-->ASSISTANCE (EQUIPMENT/PERSON) NEEDED
WEAR_GLASSES_OR_BLIND: OTHER (SEE COMMENTS)
ADLS_ACUITY_SCORE: 43
ADLS_ACUITY_SCORE: 41
DOING_ERRANDS_INDEPENDENTLY_DIFFICULTY: YES
CONCENTRATING,_REMEMBERING_OR_MAKING_DECISIONS_DIFFICULTY: NO
ADLS_ACUITY_SCORE: 43
ADLS_ACUITY_SCORE: 42

## 2022-05-16 NOTE — PLAN OF CARE
Problem: Plan of Care - These are the overarching goals to be used throughout the patient stay.    Goal: Optimal Comfort and Wellbeing  Outcome: Ongoing, Progressing     Problem: Functional Ability Impaired (Orthopaedic Fracture)  Goal: Optimal Functional Ability  Outcome: Ongoing, Progressing  Intervention: Optimize Functional Ability  Recent Flowsheet Documentation  Taken 5/16/2022 0585 by Gabrielle Machuca RN  Activity Management: up to bedside commode  Activity Assistance Provided: assistance, 1 person  Taken 5/16/2022 0120 by Gabrielle Machuca RN  Activity Management: activity adjusted per tolerance  Activity Assistance Provided: assistance, 1 person  Positioning/Transfer Devices:   pillows   in use     Problem: Pain (Orthopaedic Fracture)  Goal: Acceptable Pain Control  Outcome: Ongoing, Progressing   Goal Outcome Evaluation:    No acute events overnight.  Slept well.  Up to BSC x1.  Requires assist of 1 to BSC and needs a walker and gait belt for stability.  Denied pain this shift.

## 2022-05-16 NOTE — PLAN OF CARE
Problem: Plan of Care - These are the overarching goals to be used throughout the patient stay.    Goal: Optimal Comfort and Wellbeing  Outcome: Ongoing, Progressing    Problem: Functional Ability Impaired (Orthopaedic Fracture)  Goal: Optimal Functional Ability  Outcome: Ongoing, Progressing     Problem: Pain (Orthopaedic Fracture)  Goal: Acceptable Pain Control  Outcome: Ongoing, Progressing     Problem: Risk for Delirium  Goal: Optimal Coping  Outcome: Ongoing, Progressing  Goal: Improved Behavioral Control  Outcome: Ongoing, Progressing  Goal: Improved Attention and Thought Clarity  Outcome: Ongoing, Progressing  Goal: Improved Sleep  Outcome: Ongoing, Progressing      Pt alert and oriented. No confusion or behaviors observed thus far. Pt c/o left hip pain during transfers and cares, but none at rest. Pt WBAT to left leg. Pt assist of 1 w/ walker. Pt receiving scheduled acetaminophen for comfort/pain management. O2 sat at 92% on 2 L via NC. Potassium of 3.6 this shift; recheck in AM. Call light is within reach.

## 2022-05-16 NOTE — PROGRESS NOTES
[facility]  Hospitalist Progress Note    Admit Date:  5/11/2022  Date of Service (when I saw the patient): 05/16/2022   Provider:  Quyen Wilson, DO    Assessment & Plan   Crystal Ibarra is a 86 year old female with a history of SAH and HTN was admitted May 11, 2022 with a left superior and inferior pubic rami hip fracture following a mechanical fall.    Problem List:     1. Acute left pelvic ring fracture  Left hip pain  Mechanical fall   -Patient sustained a fall on May 11, feet got entangled, tripped and fell backward  -Sustained significant left hip pain, worse with movement  -CT pelvis consistent with a lateral compression type I left pelvic ring fracture, with a small left pelvic sidewall hematoma.  -Orthopedics consulted, no urgent or emergent surgical intervention indicated, they signed off 5/12  -PT OT evaluation - recommending TCU  -Pain control: Scheduled tylenol; try to limit narcotics d/t confusion   -Weight bearing as tolerated.      2.  Essential hypertension:Stable  -Will hold on further hydrochlorothiazide, for now, d/t hyponatremia  -Continue lisinopril with BP parameters (hold if <120)  -PTA dose is lisinopril 20mg daily, increase to 40mg today    3.  Acute hypoxic respiratory failure     Resolving community-acquired pneumonia/bronchitis  -Seen by PCP 5/9/22, with 3 weeks of cough, treated for possible bronchitis  -Completed Augmentin 7 days (out-pt/in-pt duration).  o2 weaned down to 1L today  IS at bedside as able  D-dimer slightly up, CT neg for PE      4. Frequent PVCs/bigeminy  -Seen on telemetry in the ED  -Patient is asymptomatic  -Echo reviewed, 2+ TR, some LA dilatation otherwise normal LV and RV function.   -Discontinue tele now  Magnesium wnl; potassium WNL this am    5.  Hyponatremia  Mild on lab 5/14/22. 131 today  As above, hold of hydrochlorothiazide, for now        Diet: Regular Diet Adult    DVT Prophylaxis: Enoxaparin (Lovenox) SQ  Cruz Catheter:  Not present  Code Status: Full Code      Disposition Plan   Expected Discharge: 05/16/2022     Anticipated discharge location: inpatient rehabilitation facility    Delays:     Placement - TCU         Entered: Mahi Tipton MD 05/16/2022, 1:07 PM       The patient's care was discussed with patient, SW/CM      Interval History    Chart reviewed and events noted.  Patient seen and examined.   Sitting in chair. Denies any pain this morning. No SOB, chest pain, abd pain, N/V, fever.    -Data reviewed today: I reviewed all new labs and imaging results over the last 24 hours.    Physical Exam   Temp: 98.2  F (36.8  C) Temp src: Oral BP: 118/56 Pulse: 72   Resp: 17 SpO2: 92 % O2 Device: Nasal cannula Oxygen Delivery: 1 LPM  Vitals:    05/11/22 1100 05/12/22 1219   Weight: 43.1 kg (95 lb) 43 kg (94 lb 14.4 oz)     Vital Signs with Ranges  Temp:  [97.7  F (36.5  C)-98.9  F (37.2  C)] 98.2  F (36.8  C)  Pulse:  [50-72] 72  Resp:  [17-18] 17  BP: (108-124)/(56-69) 118/56  SpO2:  [90 %-94 %] 92 %  I/O last 3 completed shifts:  In: -   Out: 1 [Urine:1]    GEN:  Alert,awake, NAD  HEENT:  Normocephalic/atraumatict  CV:  Regular rate and rhythm, S1 + S2 note  LUNGS:  Clear to auscultation ant/lat bilaterally.  No rales/rhonchi/wheezing auscultated bilaterally.    ABD:  Soft, non-tender/non-distended.   EXT:  No significant pretibial edema or cyanosis bilaterally.  SKIN:  Dry to touch, no rashes or jaundice noted.  PSYCH:  Mood appropriate, Not tearful or depressed.  Cooperative and not agitated  NEURO:  No tremors at rest,  Speech is clear and appropriate.  CN 2-12 grossly intact to testing bilaterally      Data   Labs:  Recent Labs   Lab 05/16/22  0619 05/15/22  0641 05/14/22  0647 05/13/22  2205 05/13/22  0701   *  --  133*  --  133*   POTASSIUM 3.5 3.6 3.7  3.7   < > 3.4*   CHLORIDE 96*  --  97*  --  99   CO2 25  --  23  --  23   ANIONGAP 12  --  13  --  11   GLC 89  --  100  --  104   BUN 24  --  28  --  26   CR 0.75   --  0.76  --  0.78   GFRESTIMATED 77  --  76  --  74   HARJINDER 9.1  --  8.8  --  8.9    < > = values in this interval not displayed.     Recent Labs   Lab 05/16/22  0619 05/13/22  0701   WBC 9.5 9.5   HGB 13.3 12.4   HCT 37.9 35.8   MCV 93 94    230     Recent Labs   Lab 05/16/22  0619 05/15/22  0641 05/14/22  0647 05/13/22  2205 05/13/22 0701 05/11/22  2312   *  --  133*  --  133*  --    POTASSIUM 3.5 3.6 3.7  3.7   < > 3.4* 3.8   CHLORIDE 96*  --  97*  --  99  --    CO2 25  --  23  --  23  --    ANIONGAP 12  --  13  --  11  --    GLC 89  --  100  --  104  --    BUN 24  --  28  --  26  --    CR 0.75  --  0.76  --  0.78  --    GFRESTIMATED 77  --  76  --  74  --    HARJINDER 9.1  --  8.8  --  8.9  --    MAG  --   --   --   --   --  1.8    < > = values in this interval not displayed.          Recent Imaging:   No results found for this or any previous visit (from the past 24 hour(s)).    Medications       acetaminophen  650 mg Oral 4x Daily     enoxaparin ANTICOAGULANT  40 mg Subcutaneous Q24H     [Held by provider] hydrochlorothiazide  25 mg Oral Daily     lisinopril  40 mg Oral Daily

## 2022-05-16 NOTE — PROGRESS NOTES
CM spoke to daughter to widen the TCU search. New referrals sent to [facility], [facility], [facility] and [facility]. CM to follow for final discharge plan.      2:55 PM  ULICES received a call from Ana Maria in admissions at [facility] she is able to accept pt tomorrow after 1300. Family would like CM to set up WC ride. CM set up WC ride for tomorrow 5/17 at 1400 to go to [facility] TCU. PAS needs to be done.

## 2022-05-17 VITALS
WEIGHT: 94.9 LBS | SYSTOLIC BLOOD PRESSURE: 114 MMHG | OXYGEN SATURATION: 90 % | TEMPERATURE: 97.7 F | HEART RATE: 69 BPM | BODY MASS INDEX: 18.63 KG/M2 | HEIGHT: 60 IN | DIASTOLIC BLOOD PRESSURE: 66 MMHG | RESPIRATION RATE: 16 BRPM

## 2022-05-17 PROCEDURE — 250N000013 HC RX MED GY IP 250 OP 250 PS 637: Performed by: HOSPITALIST

## 2022-05-17 PROCEDURE — 250N000013 HC RX MED GY IP 250 OP 250 PS 637: Performed by: STUDENT IN AN ORGANIZED HEALTH CARE EDUCATION/TRAINING PROGRAM

## 2022-05-17 PROCEDURE — 250N000011 HC RX IP 250 OP 636: Performed by: STUDENT IN AN ORGANIZED HEALTH CARE EDUCATION/TRAINING PROGRAM

## 2022-05-17 PROCEDURE — 99239 HOSP IP/OBS DSCHRG MGMT >30: CPT | Performed by: HOSPITALIST

## 2022-05-17 RX ADMIN — ENOXAPARIN SODIUM 40 MG: 40 INJECTION SUBCUTANEOUS at 05:43

## 2022-05-17 RX ADMIN — ACETAMINOPHEN 650 MG: 325 TABLET ORAL at 10:08

## 2022-05-17 RX ADMIN — LISINOPRIL 40 MG: 20 TABLET ORAL at 10:07

## 2022-05-17 ASSESSMENT — ACTIVITIES OF DAILY LIVING (ADL)
ADLS_ACUITY_SCORE: 44
ADLS_ACUITY_SCORE: 46
ADLS_ACUITY_SCORE: 44

## 2022-05-17 NOTE — DISCHARGE SUMMARY
[facility] MEDICINE  DISCHARGE SUMMARY     Primary Care Physician: Arcelia Pierre  Admission Date: 5/11/2022   Discharge Provider: Mahi Tipton MD Discharge Date: 5/17/2022   Diet:   Active Diet and Nourishment Order   Procedures     Regular Diet Adult     Diet       Code Status: Full Code   Activity: DCACTIVITY: Activity as tolerated        Condition at Discharge: Stable     REASON FOR PRESENTATION(See Admission Note for Details)     S/p mechanical fall, left hip fracture    PRINCIPAL & ACTIVE DISCHARGE DIAGNOSES     Active Problems:    Fracture of superior pubic ramus, left, closed, initial encounter (H)    Closed fracture of left inferior pubic ramus, initial encounter (H)      PENDING LABS     Unresulted Labs Ordered in the Past 30 Days of this Admission     No orders found from 4/11/2022 to 5/12/2022.            PROCEDURES ( this hospitalization only)          RECOMMENDATIONS TO OUTPATIENT PROVIDER FOR F/U VISIT     Follow-up Appointments     Follow Up and recommended labs and tests      Follow up with Nursing home physician.  No follow up labs or test are   needed.  Follow up with primary care provider in 1-2 weeks after discharge from TCU  Follow up with Orthopedics in 2 weeks                 DISPOSITION     Skilled Nursing facility    SUMMARY OF HOSPITAL COURSE:      Crystal Ibarra is a 86 year old female with a history of SAH and HTN was admitted May 11, 2022 with a left superior and inferior pubic rami hip fracture following a mechanical fall.     1.Acute left pelvic ring fracture  Left hip pain  Mechanical fall   -Patient sustained a fall on May 11, feet got entangled, tripped and fell backward  -Sustained significant left hip pain, worse with movement  -CT pelvis consistent with a lateral compression type I left pelvic ring fracture, with a small left pelvic sidewall hematoma.  -Orthopedics consulted, no urgent or emergent surgical intervention  indicated, they signed off 5/12  -PT OT evaluation - recommending TCU, discharging today  -Pain control with scheduled tylenol  -Weight bearing as tolerated.      2.  Essential hypertension:Stable  -Hydrochlorothiazide discontinued due to hyponatremia  -Continue PTA lisinopril and amlodipine     3.  Acute hypoxic respiratory failure     Resolving community-acquired pneumonia/bronchitis  -Seen by PCP 5/9/22, with 3 weeks of cough, treated for possible bronchitis  -Completed Augmentin 7 days (out-pt/in-pt duration).  -D-dimer slightly up, CT neg for PE  -So2 stable on RA, weaned off of o2.  -Continue to encourage IS        4. Frequent PVCs/bigeminy  -Seen on telemetry in the ED, patient is asymptomatic  -Echo reviewed, 2+ TR, some LA dilatation otherwise normal LV and RV function.      5.  Hyponatremia - mild  -Last Na check was 133.  -Discontinued hydrochlorothiazide on discharge.    .Patient stable to be discharged SNF today. Please refer to discharge medications and instructions for more details.      Discharge Medications with Med changes:     Current Discharge Medication List      CONTINUE these medications which have NOT CHANGED    Details   b complex vitamins tablet [B COMPLEX VITAMINS TABLET] Take 1 tablet by mouth daily.      lisinopril (PRINIVIL,ZESTRIL) 20 MG tablet [LISINOPRIL (PRINIVIL,ZESTRIL) 20 MG TABLET] Take 20 mg by mouth daily.      MULTIVITAMIN WITH MINERALS (HAIR,SKIN AND NAILS ORAL) [MULTIVITAMIN WITH MINERALS (HAIR,SKIN AND NAILS ORAL)] Take 1 tablet by mouth daily.      OMEGA-3/DHA/EPA/FISH OIL (FISH OIL-OMEGA-3 FATTY ACIDS) 300-1,000 mg capsule [OMEGA-3/DHA/EPA/FISH OIL (FISH OIL-OMEGA-3 FATTY ACIDS) 300-1,000 MG CAPSULE] Take 1 g by mouth daily.      amLODIPine (NORVASC) 5 MG tablet [AMLODIPINE (NORVASC) 5 MG TABLET] Take 1 tablet (5 mg total) by mouth daily.  Qty: 30 tablet, Refills: 1    Associated Diagnoses: Closed fracture of greater trochanter of left femur (H)         STOP taking these  medications       amoxicillin-clavulanate (AUGMENTIN) 875-125 MG tablet Comments:   Reason for Stopping:         hydrochlorothiazide (HYDRODIURIL) 25 MG tablet Comments:   Reason for Stopping:                     Rationale for medication changes:      See med rec        Consults       CARE MANAGEMENT / SOCIAL WORK IP CONSULT  ORTHOPEDIC SURGERY IP CONSULT  PHYSICAL THERAPY ADULT IP CONSULT  OCCUPATIONAL THERAPY ADULT IP CONSULT  SOCIAL WORK IP CONSULT  SOCIAL WORK IP CONSULT  PHYSICAL THERAPY ADULT IP CONSULT  OCCUPATIONAL THERAPY ADULT IP CONSULT    Immunizations given this encounter     Most Recent Immunizations   Administered Date(s) Administered     COVID-19,PF,Moderna 10/23/2021     FLUAD(HD)65+ QUAD 09/09/2021     Flu, Unspecified 10/28/2017           Anticoagulation Information      Recent INR results: No results for input(s): INR in the last 168 hours.  Warfarin doses (if applicable) or name of other anticoagulant: N/A      SIGNIFICANT IMAGING FINDINGS     Results for orders placed or performed during the hospital encounter of 05/11/22   Head CT w/o contrast    Impression    IMPRESSION:  1.  No CT evidence for acute intracranial process.  2.  Brain atrophy and presumed chronic microvascular ischemic changes as above.  3.  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 No signs of pneumonia or failure. Heart and pulmonary vascularity are normal. S-shaped thoracolumbar scoliosis with the thoracic curve convex to the right. Severe degenerative   changes in both shoulders.   XR Pelvis and Hip Left 2 Views    Impression    IMPRESSION: Postoperative change right total hip arthroplasty. Components appear well-seated. Fractures to the left superior and inferior pubic rami adjacent to the left pubic body. Degenerative change left hip joint. No evidence for left hip joint   fracture. Pelvis  negative for fracture. Diffuse demineralization. Vascular calcifications.   CT Pelvis Bone wo Contrast    Impression    IMPRESSION:  1.  Findings consistent with a lateral compression type I (LC1) left pelvic ring fracture. Small left pelvic sidewall hematoma.   CT Chest Pulmonary Embolism w Contrast    Impression    IMPRESSION:  1.  No pulmonary embolus.  2.  Severe centrilobular pulmonary emphysema.  3.  There is a small amount of bibasilar opacity, right greater than left. These findings represent either infectious process or atelectasis.     Echocardiogram Complete   Result Value Ref Range    LVEF  60-65%        SIGNIFICANT LABORATORY FINDINGS     Chest XR,  PA & LAT  Result Date: 5/11/2022      IMPRESSION: Mild basilar fibrosis and scarring in the right middle lobe. No signs of pneumonia or failure. Heart and pulmonary vascularity are normal. S-shaped thoracolumbar scoliosis with the thoracic curve convex to the right. Severe degenerative changes in both shoulders.    XR Pelvis and Hip Left 2 Views  Result Date: 5/11/2022      IMPRESSION: Postoperative change right total hip arthroplasty. Components appear well-seated. Fractures to the left superior and inferior pubic rami adjacent to the left pubic body. Degenerative change left hip joint. No evidence for left hip joint fracture. Pelvis negative for fracture. Diffuse demineralization. Vascular calcifications.    Echocardiogram Complete  Result Date: 5/12/2022   Interpretation Summary  1.Left ventricular size, wall motion and function are normal. The ejection fraction is 60-65%. 2.Normal right ventricle size and systolic function. 3.The left atrium is mild to moderately dilated. The right atrium is mildly dilated. 4.There is moderate (2+) tricuspid regurgitation. 5.Technically difficult, suboptimal study due to patient cooperation. There is no comparison study available. ______________________________________________________________________________ I      WMSI  = 1.00     % Normal = 100  X - Cannot   0 -                      (2) - Mildly 2 -          Segments  Size Interpret    Hyperkinetic 1 - Normal  Hypokinetic  Hypokinetic  1-2     small                                                    7 -          3-5    moderate 3 - Akinetic 4 -          5 -         6 - Akinetic Dyskinetic   6-14    large              Dyskinetic   Aneurysmal  w/scar       w/scar       15-16   diffuse  Left Ventricle Left ventricular size, wall motion and function are normal. The ejection fraction is 60-65%. There is normal left ventricular wall thickness. Left ventricular diastolic function is normal. No regional wall motion abnormalities noted.  Right Ventricle Normal right ventricle size and systolic function.  Atria The left atrium is mild to moderately dilated. The right atrium is mildly dilated. There is no color Doppler evidence of an atrial shunt.  Mitral Valve There is moderate mitral annular calcification. The mitral valve leaflets appear thickened, but open well. There is trace mitral regurgitation. There is no mitral valve stenosis.  Tricuspid Valve The tricuspid valve is not well visualized, but is grossly normal. There is moderate (2+) tricuspid regurgitation. There is no tricuspid stenosis.  Aortic Valve The aortic valve is trileaflet. Thickened aortic valve leaflets. No aortic regurgitation is present. No aortic stenosis is present.  Pulmonic Valve The pulmonic valve is not well seen, but is grossly normal. This degree of valvular regurgitation is within normal limits. There is no pulmonic valvular stenosis.  Vessels The aorta root is normal. Normal size ascending aorta. IVC diameter <2.1 cm collapsing >50% with sniff suggests a normal RA pressure of 3 mmHg.  Pericardium There is no pericardial effusion.  Rhythm Sinus rhythm was noted.  ______________________________________________________________________________ MMode/2D Measurements & Calculations IVSd: 0.79 cm LVIDd: 3.8 cm  LVIDs: 2.3 cm LVPWd: 0.68 cm FS: 40.0 %  LV mass(C)d: 77.5 grams LV mass(C)dI: 58.0 grams/m2 Ao root diam: 3.0 cm LA dimension: 3.2 cm LA/Ao: 1.1 LVOT diam: 2.2 cm LVOT area: 3.9 cm2 LA Volume Indexed (AL/bp): 40.3 ml/m2 RWT: 0.35  Time Measurements MM HR: 56.0 BPM  ______________________________________________________________________________ Report approved by: Mckenna Escalante 05/12/2022 04:57 PM       CT Chest Pulmonary Embolism w Contrast  Result Date: 5/15/2022      IMPRESSION: 1.  No pulmonary embolus. 2.  Severe centrilobular pulmonary emphysema. 3.  There is a small amount of bibasilar opacity, right greater than left. These findings represent either infectious process or atelectasis.     Head CT w/o contrast    Result Date: 5/11/2022  EXAM: CT HEAD W/O CONTRAST LOCATION: [facility] DATE/TIME: 5/11/2022 11:50 AM INDICATION: Traumatic head injury. Status post fall. COMPARISON: Head CT 03/06/2018 TECHNIQUE: Routine CT Head without IV contrast. Multiplanar reformats. Dose reduction techniques were used. FINDINGS: INTRACRANIAL CONTENTS: No intracranial hemorrhage, extraaxial collection, or mass effect.  No CT evidence of acute infarct. Moderate presumed chronic small vessel ischemic changes. Chronic infarcts in the cerebellar hemispheres new compared to the prior study. Moderate generalized volume loss. No hydrocephalus. VISUALIZED ORBITS/SINUSES/MASTOIDS: No intraorbital abnormality. No paranasal sinus mucosal disease. No middle ear or mastoid effusion. BONES/SOFT TISSUES: No acute abnormality.     IMPRESSION: 1.  No CT evidence for acute intracranial process. 2.  Brain atrophy and presumed chronic microvascular ischemic changes as above. 3.  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    CT Pelvis Bone wo Contrast  Result Date: 5/11/2022      IMPRESSION: 1.  Findings consistent with a lateral compression type I (LC1) left pelvic ring fracture. Small left pelvic  sidewall hematoma.      Discharge Orders        General info for SNF    Length of Stay Estimate: Short Term Care: Estimated # of Days <30  Condition at Discharge: Stable  Level of care:skilled   Rehabilitation Potential: Good  Admission H&P remains valid and up-to-date: Yes  Recent Chemotherapy: N/A  Use Nursing Home Standing Orders: Yes     Mantoux instructions    Give two-step Mantoux (PPD) Per Facility Policy Yes     Follow Up and recommended labs and tests    Follow up with Nursing home physician.  No follow up labs or test are needed.  Follow up with primary care provider in 1-2 weeks after discharge from TCU  Follow up with Orthopedics in 2 weeks     Reason for your hospital stay    S/p fall, hip fracture     Activity - Up ad elodia     Physical Therapy Adult Consult    Evaluate and treat as clinically indicated.    Reason:  Weakness     Occupational Therapy Adult Consult    Evaluate and treat as clinically indicated.    Reason:  Weakness     Fall precautions     Diet    Follow this diet upon discharge: Orders Placed This Encounter      Regular Diet Adult       Examination   Physical Exam   Temp:  [97.7  F (36.5  C)-98.3  F (36.8  C)] 97.7  F (36.5  C)  Pulse:  [52-70] 69  Resp:  [16-18] 16  BP: ()/(52-66) 114/66  SpO2:  [88 %-92 %] 90 %  Wt Readings from Last 1 Encounters:   05/12/22 43 kg (94 lb 14.4 oz)       GEN:  Alert,awake, NAD  HEENT:  Normocephalic/atraumatict  CV:  Regular rate and rhythm, S1 + S2 note  LUNGS:  Clear to auscultation bilaterally   ABD:  Soft, non-tender/non-distended.   EXT:  No significant pretibial edema or cyanosis bilaterally.  SKIN:  Dry to touch, no rashes or jaundice noted.  PSYCH:  Mood appropriate, Not tearful or depressed.  Cooperative and not agitated  NEURO:  No tremors at rest,  Speech is clear and appropriate.  CN 2-12 grossly intact to testing bilaterally      Please see EMR for more detailed significant labs, imaging, consultant notes etc.    Mahi PERSAUD MD,  personally saw the patient today and spent greater than 30 minutes discharging this patient.    Mahi Tipton MD  [facility]    CC:Ignacio, Arcelia Reeder

## 2022-05-17 NOTE — PLAN OF CARE
Physical Therapy Discharge Summary    Reason for therapy discharge:    Discharged to transitional care facility.    Progress towards therapy goal(s). See goals on Care Plan in [facility] electronic health record for goal details.  Goals partially met.  Barriers to achieving goals:   discharge from facility.    Therapy recommendation(s):    Recommend continued therapies to improve overall strength, balance and mobility.     Raquel Contreras, PT, DPT  5/17/2022

## 2022-05-17 NOTE — PLAN OF CARE
"  Problem: Plan of Care - These are the overarching goals to be used throughout the patient stay.    Goal: Plan of Care Review/Shift Note  Description: The Plan of Care Review/Shift note should be completed every shift.  The Outcome Evaluation is a brief statement about your assessment that the patient is improving, declining, or no change.  This information will be displayed automatically on your shift note.  Outcome: Ongoing, Progressing  Goal: Patient-Specific Goal (Individualized)  Description: You can add care plan individualizations to a care plan. Examples of Individualization might be:  \"Parent requests to be called daily at 9am for status\", \"I have a hard time hearing out of my right ear\", or \"Do not touch me to wake me up as it startles me\".  Outcome: Ongoing, Progressing  Goal: Absence of Hospital-Acquired Illness or Injury  Outcome: Ongoing, Progressing  Intervention: Identify and Manage Fall Risk  Recent Flowsheet Documentation  Taken 5/16/2022 1540 by Julissa Christiansen RN  Safety Promotion/Fall Prevention: activity supervised  Intervention: Prevent and Manage VTE (Venous Thromboembolism) Risk  Recent Flowsheet Documentation  Taken 5/16/2022 1800 by Julissa Christiansen RN  Activity Management: stepped/marched in place  Goal: Optimal Comfort and Wellbeing  Outcome: Ongoing, Progressing  Goal: Readiness for Transition of Care  Outcome: Ongoing, Progressing  Intervention: Mutually Develop Transition Plan  Recent Flowsheet Documentation  Taken 5/16/2022 1700 by Julissa Christiansen RN  Equipment Currently Used at Home: walker, rolling     Problem: Fracture Stabilization and Management (Orthopaedic Fracture)  Goal: Fracture Stability  Outcome: Ongoing, Progressing     Problem: Functional Ability Impaired (Orthopaedic Fracture)  Goal: Optimal Functional Ability  Outcome: Ongoing, Progressing  Intervention: Optimize Functional Ability  Recent Flowsheet Documentation  Taken 5/16/2022 1800 by Julissa Christiansen RN  Activity " Management: stepped/marched in place  Activity Assistance Provided: assistance, 1 person     Problem: Pain (Orthopaedic Fracture)  Goal: Acceptable Pain Control  Outcome: Ongoing, Progressing     Problem: Risk for Delirium  Goal: Optimal Coping  Outcome: Ongoing, Progressing  Goal: Improved Behavioral Control  Outcome: Ongoing, Progressing  Goal: Improved Attention and Thought Clarity  Outcome: Ongoing, Progressing  Goal: Improved Sleep  Outcome: Ongoing, Progressing   Goal Outcome Evaluation:  Pt in bed most of the shift,step marched on the bedside but stated it was too uncomfortable.Paged Dr Tipton about third heart sound,no new orders,pt was previously on tele but discontinued over the weekend,will continue to monitor. Given a shower and linens changed.Pain controlled with scheduled medications.Bed alarm on for safety.

## 2022-05-17 NOTE — PLAN OF CARE
Problem: Pain (Orthopaedic Fracture)  Goal: Acceptable Pain Control  5/17/2022 0545 by Danni Kelly, RN  Outcome: Ongoing, Progressing  5/17/2022 0544 by Danni Kelly RN  Outcome: Ongoing, Progressing   Goal Outcome Evaluation:      Crystal stated her pain was okay at 0030; she had had tylenol at 2200.  At 0530 requested pain med; paged hospitalist for possible ibuprofen order.  Problem: Risk for Delirium  Goal: Improved Sleep  Outcome: Ongoing, Progressing   She appeared to sleep well thru the night.  Problem: Gas Exchange Impaired  Goal: Optimal Gas Exchange  Outcome: Ongoing, Not Progressing  Intervention: Optimize Oxygenation and Ventilation  Recent Flowsheet Documentation  Taken 5/17/2022 0000 by Danni Kelly RN  Head of Bed (HOB) Positioning: HOB at 30 degrees     On room air her sats were 88-90%.  Put on 1lt nasal canula and she was 91-92%.

## 2022-05-17 NOTE — PROGRESS NOTES
Care Management Discharge Note    Discharge Date: 05/17/2022       Discharge Disposition: Transitional Care ([facility])    Discharge Services:  TCU    Discharge DME:  None    Discharge Transportation:  [facility]  1400    Private pay costs discussed: transportation costs    PAS Confirmation Code:  ([id number])  Patient/family educated on Medicare website which has current facility and service quality ratings:  yes    Education Provided on the Discharge Plan:  yes  Persons Notified of Discharge Plans: pt and DTR Latoya  Patient/Family in Agreement with the Plan: yes    Handoff Referral Completed: Yes    Additional Information:  Pt adequate for discharge, no further CM needs at this time        Daisy Ballesteros RN

## 2022-05-17 NOTE — PLAN OF CARE
Occupational Therapy Discharge Summary    Reason for therapy discharge:    Discharged to transitional care facility.    Progress towards therapy goal(s). See goals on Care Plan in [facility] electronic health record for goal details.  Goals not met.  Barriers to achieving goals:   discharge from facility.    Therapy recommendation(s):    Continued therapy is recommended.  Rationale/Recommendations:  Pt is going to a TCU to increase skills to highest level for a safe d/c plan..

## 2022-05-18 ENCOUNTER — TRANSITIONAL CARE UNIT VISIT (OUTPATIENT)
Dept: GERIATRICS | Facility: CLINIC | Age: 87
End: 2022-05-18
Payer: COMMERCIAL

## 2022-05-18 ENCOUNTER — LAB REQUISITION (OUTPATIENT)
Dept: LAB | Facility: CLINIC | Age: 87
End: 2022-05-18
Payer: COMMERCIAL

## 2022-05-18 ENCOUNTER — PATIENT OUTREACH (OUTPATIENT)
Dept: CARE COORDINATION | Facility: CLINIC | Age: 87
End: 2022-05-18
Payer: COMMERCIAL

## 2022-05-18 VITALS
RESPIRATION RATE: 18 BRPM | TEMPERATURE: 97.3 F | WEIGHT: 93 LBS | SYSTOLIC BLOOD PRESSURE: 144 MMHG | BODY MASS INDEX: 18.26 KG/M2 | DIASTOLIC BLOOD PRESSURE: 84 MMHG | HEIGHT: 60 IN | OXYGEN SATURATION: 90 % | HEART RATE: 72 BPM

## 2022-05-18 DIAGNOSIS — E46 PROTEIN-CALORIE MALNUTRITION, UNSPECIFIED SEVERITY (H): ICD-10-CM

## 2022-05-18 DIAGNOSIS — E87.1 HYPO-OSMOLALITY AND HYPONATREMIA: ICD-10-CM

## 2022-05-18 DIAGNOSIS — E87.1 HYPONATREMIA: ICD-10-CM

## 2022-05-18 DIAGNOSIS — I10 HYPERTENSION, UNSPECIFIED TYPE: ICD-10-CM

## 2022-05-18 DIAGNOSIS — M15.3 OTHER SECONDARY OSTEOARTHRITIS OF MULTIPLE SITES: ICD-10-CM

## 2022-05-18 DIAGNOSIS — Z71.89 OTHER SPECIFIED COUNSELING: ICD-10-CM

## 2022-05-18 DIAGNOSIS — S32.592S CLOSED FRACTURE OF MULTIPLE PUBIC RAMI, LEFT, SEQUELA: Primary | ICD-10-CM

## 2022-05-18 DIAGNOSIS — J40 BRONCHITIS: ICD-10-CM

## 2022-05-18 PROBLEM — M81.0 OSTEOPOROSIS: Status: ACTIVE | Noted: 2022-05-18

## 2022-05-18 PROBLEM — M41.9 SCOLIOSIS: Status: ACTIVE | Noted: 2022-05-18

## 2022-05-18 PROBLEM — M19.90 OA (OSTEOARTHRITIS): Status: ACTIVE | Noted: 2022-05-18

## 2022-05-18 PROCEDURE — 99310 SBSQ NF CARE HIGH MDM 45: CPT | Performed by: NURSE PRACTITIONER

## 2022-05-18 RX ORDER — ACETAMINOPHEN 325 MG/1
650 TABLET ORAL EVERY 6 HOURS PRN
Status: ON HOLD | COMMUNITY
End: 2023-01-01

## 2022-05-18 RX ORDER — IPRATROPIUM BROMIDE AND ALBUTEROL SULFATE 2.5; .5 MG/3ML; MG/3ML
1 SOLUTION RESPIRATORY (INHALATION) 3 TIMES DAILY
COMMUNITY
End: 2023-01-01

## 2022-05-18 NOTE — PROGRESS NOTES
Clinic Care Coordination Contact    Background: Care Coordination referral placed from [facility] discharge report for reason of patient meeting criteria for a TCM outreach call by [facility] team.    Assessment: Upon chart review, [facility] Team member will cancel/close the referral for TCM outreach due to reason below:    Patient has discharged to a Group home, Memory Care or Nursing Home.    Plan: Care Coordination referral for TCM outreach canceled.    Ciarra Booth MA  [facility], [facility]

## 2022-05-18 NOTE — LETTER
5/18/2022        RE: Crystal Ibarra  [address]  [place] [address]        [facility]    Code Status:  FULL CODE   Visit Type:   Chief Complaint   Patient presents with     Hospital F/U     TCU Admission     Facility:  [facility] ([facility]) [[id number]]           Transitional Care Course: Crystal Ibarra is a 86 year old female who I am seeing today for admit to the TCU.  Patient recently hospitalized on 5/11/2022 status post fall with fracture of the superior pubic Ramus on the left.  Past medical history includes multiple falls, SAH, hypertension, hyponatremia, recent pneumonia/bronchitis and frequent PVCs/bigeminy.  Patient had a mechanical fall on 5/11 in which she tripped and fell backwards.  She presented with significant left hip pain and underwent CT which showed a lateral compression type I left pelvic ring fracture with a small left pelvic sidewall hematoma.  Orthopedics was consulted and treated patient conservatively.  Patient weightbearing as tolerated.  Essential hypertension.  She continues on lisinopril and amlodipine.  Her hydrochlorothiazide was discontinued due to hyponatremia.  Sodium 130.  Acute hypoxic respiratory failure.  Patient had been seen by her PCP on 5/9/2022 with 3 weeks of cough and was treated for possible bronchitis.  She completed her Augmentin of 7 days.  D-dimer was slightly elevated.  She underwent CT of the chest which was negative for PE.  She was initially treated with oxygen but weaned off.  Frequent PVCs/bigeminy.  Patient asymptomatic.  Echo reviewed which showed some LA dilatation otherwise normal LV and RV function.  She did have some tricuspid regurgitation.  Protein malnutrition.  Patient very thin.  It was suggested for primary care provider that she start on boost.    On today's visit patient sitting up in bedside chair.  Her son and daughter are present on exam.  Patient is complaining of some pain in her left groin.   Currently if she did so no pain medication.  We did talk about starting Tylenol.  Hypertension.  Blood pressure appears satisfactory.  Recent bronchitis.  Patient has completed her oral antibiotics.  She does continue with a very wet thick coarse cough.  We did discuss use of nebulizer on today's visit we will treat for short course.  Patient denies any chest pain or palpitations.  She denies any shortness of breath.  Hyponatremia with sodium recently 133.  Patient very thin.  We did discuss meeting with a dietitian for nutritional supplement.  Patient with multiple falls previously living independently.    Active Ambulatory Problems     Diagnosis Date Noted     Subarachnoid bleed (H) 02/21/2018     SAH (subarachnoid hemorrhage) (H) 02/21/2018     Hypertensive emergency 02/21/2018     Closed fracture of greater trochanter of left femur (H) 02/21/2018     Scalp laceration 03/06/2018     Fracture of superior pubic ramus, left, closed, initial encounter (H) 05/11/2022     Closed fracture of left inferior pubic ramus, initial encounter (H) 05/11/2022     Scoliosis 05/18/2022     Protein-calorie malnutrition, unspecified severity (H) 05/09/2022     Osteoporosis 05/18/2022     OA (osteoarthritis) 05/18/2022     HTN (hypertension) 05/18/2022     Resolved Ambulatory Problems     Diagnosis Date Noted     No Resolved Ambulatory Problems     No Additional Past Medical History       Current Outpatient Medications:      acetaminophen (TYLENOL) 325 MG tablet, Take 650 mg by mouth every 6 hours, Disp: , Rfl:      amLODIPine (NORVASC) 5 MG tablet, [AMLODIPINE (NORVASC) 5 MG TABLET] Take 1 tablet (5 mg total) by mouth daily., Disp: 30 tablet, Rfl: 1     b complex vitamins tablet, [B COMPLEX VITAMINS TABLET] Take 1 tablet by mouth daily., Disp: , Rfl:      ipratropium - albuterol 0.5 mg/2.5 mg/3 mL (DUONEB) 0.5-2.5 (3) MG/3ML neb solution, Take 1 vial by nebulization 3 times daily, Disp: , Rfl:      lisinopril (PRINIVIL,ZESTRIL) 20  MG tablet, [LISINOPRIL (PRINIVIL,ZESTRIL) 20 MG TABLET] Take 20 mg by mouth daily., Disp: , Rfl:      MULTIVITAMIN WITH MINERALS (HAIR,SKIN AND NAILS ORAL), [MULTIVITAMIN WITH MINERALS (HAIR,SKIN AND NAILS ORAL)] Take 1 tablet by mouth daily., Disp: , Rfl:      OMEGA-3/DHA/EPA/FISH OIL (FISH OIL-OMEGA-3 FATTY ACIDS) 300-1,000 mg capsule, [OMEGA-3/DHA/EPA/FISH OIL (FISH OIL-OMEGA-3 FATTY ACIDS) 300-1,000 MG CAPSULE] Take 1 g by mouth daily., Disp: , Rfl:   Allergies   Allergen Reactions     Atenolol      Other reaction(s): Sedation  Intol. Fatigue  At 50 mg or more  25 mg OK       All Meds and Allergies reviewed in the record at the facility and is the most up-to-date.    Post Discharge Medication Reconciliation Status: discharge medications reconciled and changed, per note/orders  Current Outpatient Medications   Medication Sig     acetaminophen (TYLENOL) 325 MG tablet Take 650 mg by mouth every 6 hours     amLODIPine (NORVASC) 5 MG tablet [AMLODIPINE (NORVASC) 5 MG TABLET] Take 1 tablet (5 mg total) by mouth daily.     b complex vitamins tablet [B COMPLEX VITAMINS TABLET] Take 1 tablet by mouth daily.     ipratropium - albuterol 0.5 mg/2.5 mg/3 mL (DUONEB) 0.5-2.5 (3) MG/3ML neb solution Take 1 vial by nebulization 3 times daily     lisinopril (PRINIVIL,ZESTRIL) 20 MG tablet [LISINOPRIL (PRINIVIL,ZESTRIL) 20 MG TABLET] Take 20 mg by mouth daily.     MULTIVITAMIN WITH MINERALS (HAIR,SKIN AND NAILS ORAL) [MULTIVITAMIN WITH MINERALS (HAIR,SKIN AND NAILS ORAL)] Take 1 tablet by mouth daily.     OMEGA-3/DHA/EPA/FISH OIL (FISH OIL-OMEGA-3 FATTY ACIDS) 300-1,000 mg capsule [OMEGA-3/DHA/EPA/FISH OIL (FISH OIL-OMEGA-3 FATTY ACIDS) 300-1,000 MG CAPSULE] Take 1 g by mouth daily.     No current facility-administered medications for this visit.       REVIEW OF SYSTEMS:   Review of Systems  No fevers or chills. No headache, lightheadedness or dizziness. No SOB, chest pains or palpitations. Appetite is good. No nausea, vomiting,  constipation or diarrhea. No dysuria, frequency, burning or pain with urination. Otherwise review of systems are negative.     PHYSICAL EXAMINATION:  Physical Exam     Vital signs: BP (!) 144/84   Pulse 72   Temp 97.3  F (36.3  C)   Resp 18   Ht 1.524 m (5')   Wt 42.2 kg (93 lb)   SpO2 90%   BMI 18.16 kg/m    General: Awake, Alert, oriented x3, appropriately, follows simple commands, conversant  HEENT:PERRLA, Pink conjunctiva, anicteric sclerae, moist oral mucosa  NECK: Supple, without any lymphadenopathy, or masses  CVS:  S1  S2, without murmur or gallop.   LUNG: Clear to auscultation, No wheezes, rales or rhonci.  BACK: No kyphosis of the thoracic spine  ABDOMEN: Soft, nontender to palpation, with positive bowel sounds  EXTREMITIES: Good range of motion on both upper and lower extremities, no pedal edema, no calf tenderness  SKIN: Warm and dry, no rashes or erythema noted  NEUROLOGIC: Intact, pulses palpable  PSYCHIATRIC: Cognition intact      Labs:  All labs reviewed in the nursing home record and Epic   @  Lab Results   Component Value Date    WBC 9.5 05/16/2022     Lab Results   Component Value Date    RBC 4.08 05/16/2022     Lab Results   Component Value Date    HGB 13.3 05/16/2022     Lab Results   Component Value Date    HCT 37.9 05/16/2022     Lab Results   Component Value Date    MCV 93 05/16/2022     Lab Results   Component Value Date    MCH 32.6 05/16/2022     Lab Results   Component Value Date    MCHC 35.1 05/16/2022     Lab Results   Component Value Date    RDW 13.4 05/16/2022     Lab Results   Component Value Date     05/16/2022        @Last Comprehensive Metabolic Panel:  Sodium   Date Value Ref Range Status   05/16/2022 133 (L) 136 - 145 mmol/L Final     Potassium   Date Value Ref Range Status   05/16/2022 3.5 3.5 - 5.0 mmol/L Final     Chloride   Date Value Ref Range Status   05/16/2022 96 (L) 98 - 107 mmol/L Final     Carbon Dioxide (CO2)   Date Value Ref Range Status   05/16/2022 25  22 - 31 mmol/L Final     Anion Gap   Date Value Ref Range Status   05/16/2022 12 5 - 18 mmol/L Final     Glucose   Date Value Ref Range Status   05/16/2022 89 70 - 125 mg/dL Final     Urea Nitrogen   Date Value Ref Range Status   05/16/2022 24 8 - 28 mg/dL Final     Creatinine   Date Value Ref Range Status   05/16/2022 0.75 0.60 - 1.10 mg/dL Final     GFR Estimate   Date Value Ref Range Status   05/16/2022 77 >60 mL/min/1.73m2 Final     Comment:     Effective December 21, 2021 eGFRcr in adults is calculated using the 2021 CKD-EPI creatinine equation which includes age and gender (Neeru et al., NEJ, DOI: 10.1056/QLVUbu7006391)   02/23/2018 >60 >60 mL/min/1.73m2 Final     Calcium   Date Value Ref Range Status   05/16/2022 9.1 8.5 - 10.5 mg/dL Final     Assessment/plan:      ICD-10-CM    1. Closed fracture of multiple pubic rami, left, sequela  S32.592S  Scheduled Tylenol 650 mg p.o. every 6 hours.  Okay for PT OT eval and treat.  Follow-up CBC.   2. Protein-calorie malnutrition, unspecified severity (H)  E46  RD consult.   3. Bronchitis  J40  patient completed a 7-day course of Augmentin.  She continues with a very coarse wet nonproductive cough.  CT of the chest negative during hospitalization.  Duo nebs 3 times daily x5 days.  Encourage cough and deep breathing.  Incentive spirometer every 4 hours while awake.   4. Hypertension, unspecified type  I10  satisfactory.  Hydrochlorothiazide discontinued during hospitalization.   5. Hyponatremia  E87.1  133.  Hydrochlorothiazide discontinued.  Follow-up BMP in area.   6. Other secondary osteoarthritis of multiple sites  M15.3  Start Tylenol.     45 minutes spent of which greater than 50% was face to face communication with the patient and her family about pain management, nebulizer treatment as well as antihypertensives and low sodium.      This note has been dictated using voice recognition software. Any grammatical or context distortions are unintentional and inherent  to the software    Electronically signed by: Daisy Watts CNP           Sincerely,        Daisy Watts NP

## 2022-05-19 LAB
ANION GAP SERPL CALCULATED.3IONS-SCNC: 11 MMOL/L (ref 5–18)
BASOPHILS # BLD AUTO: 0.1 10E3/UL (ref 0–0.2)
BASOPHILS NFR BLD AUTO: 1 %
BUN SERPL-MCNC: 31 MG/DL (ref 8–28)
CALCIUM SERPL-MCNC: 9.5 MG/DL (ref 8.5–10.5)
CHLORIDE BLD-SCNC: 95 MMOL/L (ref 98–107)
CO2 SERPL-SCNC: 26 MMOL/L (ref 22–31)
CREAT SERPL-MCNC: 0.78 MG/DL (ref 0.6–1.1)
EOSINOPHIL # BLD AUTO: 0.3 10E3/UL (ref 0–0.7)
EOSINOPHIL NFR BLD AUTO: 3 %
ERYTHROCYTE [DISTWIDTH] IN BLOOD BY AUTOMATED COUNT: 13.8 % (ref 10–15)
GFR SERPL CREATININE-BSD FRML MDRD: 74 ML/MIN/1.73M2
GLUCOSE BLD-MCNC: 89 MG/DL (ref 70–125)
HCT VFR BLD AUTO: 35.1 % (ref 35–47)
HGB BLD-MCNC: 12 G/DL (ref 11.7–15.7)
IMM GRANULOCYTES # BLD: 0.1 10E3/UL
IMM GRANULOCYTES NFR BLD: 1 %
LYMPHOCYTES # BLD AUTO: 1 10E3/UL (ref 0.8–5.3)
LYMPHOCYTES NFR BLD AUTO: 11 %
MCH RBC QN AUTO: 31.8 PG (ref 26.5–33)
MCHC RBC AUTO-ENTMCNC: 34.2 G/DL (ref 31.5–36.5)
MCV RBC AUTO: 93 FL (ref 78–100)
MONOCYTES # BLD AUTO: 0.8 10E3/UL (ref 0–1.3)
MONOCYTES NFR BLD AUTO: 8 %
NEUTROPHILS # BLD AUTO: 7 10E3/UL (ref 1.6–8.3)
NEUTROPHILS NFR BLD AUTO: 76 %
NRBC # BLD AUTO: 0 10E3/UL
NRBC BLD AUTO-RTO: 0 /100
PLATELET # BLD AUTO: 392 10E3/UL (ref 150–450)
POTASSIUM BLD-SCNC: 3.7 MMOL/L (ref 3.5–5)
RBC # BLD AUTO: 3.77 10E6/UL (ref 3.8–5.2)
SODIUM SERPL-SCNC: 132 MMOL/L (ref 136–145)
WBC # BLD AUTO: 9.2 10E3/UL (ref 4–11)

## 2022-05-19 PROCEDURE — 80048 BASIC METABOLIC PNL TOTAL CA: CPT | Mod: ORL | Performed by: NURSE PRACTITIONER

## 2022-05-19 PROCEDURE — P9604 ONE-WAY ALLOW PRORATED TRIP: HCPCS | Mod: ORL | Performed by: NURSE PRACTITIONER

## 2022-05-19 PROCEDURE — 85025 COMPLETE CBC W/AUTO DIFF WBC: CPT | Mod: ORL | Performed by: NURSE PRACTITIONER

## 2022-05-19 PROCEDURE — 36415 COLL VENOUS BLD VENIPUNCTURE: CPT | Mod: ORL | Performed by: NURSE PRACTITIONER

## 2022-05-19 NOTE — PROGRESS NOTES
[facility]    Code Status:  FULL CODE   Visit Type:   Chief Complaint   Patient presents with     Hospital F/U     TCU Admission     Facility:  [facility] ([facility]) [[id number]]           Transitional Care Course: Crystal Ibarra is a 86 year old female who I am seeing today for admit to the TCU.  Patient recently hospitalized on 5/11/2022 status post fall with fracture of the superior pubic Ramus on the left.  Past medical history includes multiple falls, SAH, hypertension, hyponatremia, recent pneumonia/bronchitis and frequent PVCs/bigeminy.  Patient had a mechanical fall on 5/11 in which she tripped and fell backwards.  She presented with significant left hip pain and underwent CT which showed a lateral compression type I left pelvic ring fracture with a small left pelvic sidewall hematoma.  Orthopedics was consulted and treated patient conservatively.  Patient weightbearing as tolerated.  Essential hypertension.  She continues on lisinopril and amlodipine.  Her hydrochlorothiazide was discontinued due to hyponatremia.  Sodium 130.  Acute hypoxic respiratory failure.  Patient had been seen by her PCP on 5/9/2022 with 3 weeks of cough and was treated for possible bronchitis.  She completed her Augmentin of 7 days.  D-dimer was slightly elevated.  She underwent CT of the chest which was negative for PE.  She was initially treated with oxygen but weaned off.  Frequent PVCs/bigeminy.  Patient asymptomatic.  Echo reviewed which showed some LA dilatation otherwise normal LV and RV function.  She did have some tricuspid regurgitation.  Protein malnutrition.  Patient very thin.  It was suggested for primary care provider that she start on boost.    On today's visit patient sitting up in bedside chair.  Her son and daughter are present on exam.  Patient is complaining of some pain in her left groin.  Currently if she did so no pain medication.  We did talk about starting Tylenol.  Hypertension.  Blood  pressure appears satisfactory.  Recent bronchitis.  Patient has completed her oral antibiotics.  She does continue with a very wet thick coarse cough.  We did discuss use of nebulizer on today's visit we will treat for short course.  Patient denies any chest pain or palpitations.  She denies any shortness of breath.  Hyponatremia with sodium recently 133.  Patient very thin.  We did discuss meeting with a dietitian for nutritional supplement.  Patient with multiple falls previously living independently.    Active Ambulatory Problems     Diagnosis Date Noted     Subarachnoid bleed (H) 02/21/2018     SAH (subarachnoid hemorrhage) (H) 02/21/2018     Hypertensive emergency 02/21/2018     Closed fracture of greater trochanter of left femur (H) 02/21/2018     Scalp laceration 03/06/2018     Fracture of superior pubic ramus, left, closed, initial encounter (H) 05/11/2022     Closed fracture of left inferior pubic ramus, initial encounter (H) 05/11/2022     Scoliosis 05/18/2022     Protein-calorie malnutrition, unspecified severity (H) 05/09/2022     Osteoporosis 05/18/2022     OA (osteoarthritis) 05/18/2022     HTN (hypertension) 05/18/2022     Resolved Ambulatory Problems     Diagnosis Date Noted     No Resolved Ambulatory Problems     No Additional Past Medical History       Current Outpatient Medications:      acetaminophen (TYLENOL) 325 MG tablet, Take 650 mg by mouth every 6 hours, Disp: , Rfl:      amLODIPine (NORVASC) 5 MG tablet, [AMLODIPINE (NORVASC) 5 MG TABLET] Take 1 tablet (5 mg total) by mouth daily., Disp: 30 tablet, Rfl: 1     b complex vitamins tablet, [B COMPLEX VITAMINS TABLET] Take 1 tablet by mouth daily., Disp: , Rfl:      ipratropium - albuterol 0.5 mg/2.5 mg/3 mL (DUONEB) 0.5-2.5 (3) MG/3ML neb solution, Take 1 vial by nebulization 3 times daily, Disp: , Rfl:      lisinopril (PRINIVIL,ZESTRIL) 20 MG tablet, [LISINOPRIL (PRINIVIL,ZESTRIL) 20 MG TABLET] Take 20 mg by mouth daily., Disp: , Rfl:       MULTIVITAMIN WITH MINERALS (HAIR,SKIN AND NAILS ORAL), [MULTIVITAMIN WITH MINERALS (HAIR,SKIN AND NAILS ORAL)] Take 1 tablet by mouth daily., Disp: , Rfl:      OMEGA-3/DHA/EPA/FISH OIL (FISH OIL-OMEGA-3 FATTY ACIDS) 300-1,000 mg capsule, [OMEGA-3/DHA/EPA/FISH OIL (FISH OIL-OMEGA-3 FATTY ACIDS) 300-1,000 MG CAPSULE] Take 1 g by mouth daily., Disp: , Rfl:   Allergies   Allergen Reactions     Atenolol      Other reaction(s): Sedation  Intol. Fatigue  At 50 mg or more  25 mg OK       All Meds and Allergies reviewed in the record at the facility and is the most up-to-date.    Post Discharge Medication Reconciliation Status: discharge medications reconciled and changed, per note/orders  Current Outpatient Medications   Medication Sig     acetaminophen (TYLENOL) 325 MG tablet Take 650 mg by mouth every 6 hours     amLODIPine (NORVASC) 5 MG tablet [AMLODIPINE (NORVASC) 5 MG TABLET] Take 1 tablet (5 mg total) by mouth daily.     b complex vitamins tablet [B COMPLEX VITAMINS TABLET] Take 1 tablet by mouth daily.     ipratropium - albuterol 0.5 mg/2.5 mg/3 mL (DUONEB) 0.5-2.5 (3) MG/3ML neb solution Take 1 vial by nebulization 3 times daily     lisinopril (PRINIVIL,ZESTRIL) 20 MG tablet [LISINOPRIL (PRINIVIL,ZESTRIL) 20 MG TABLET] Take 20 mg by mouth daily.     MULTIVITAMIN WITH MINERALS (HAIR,SKIN AND NAILS ORAL) [MULTIVITAMIN WITH MINERALS (HAIR,SKIN AND NAILS ORAL)] Take 1 tablet by mouth daily.     OMEGA-3/DHA/EPA/FISH OIL (FISH OIL-OMEGA-3 FATTY ACIDS) 300-1,000 mg capsule [OMEGA-3/DHA/EPA/FISH OIL (FISH OIL-OMEGA-3 FATTY ACIDS) 300-1,000 MG CAPSULE] Take 1 g by mouth daily.     No current facility-administered medications for this visit.       REVIEW OF SYSTEMS:   Review of Systems  No fevers or chills. No headache, lightheadedness or dizziness. No SOB, chest pains or palpitations. Appetite is good. No nausea, vomiting, constipation or diarrhea. No dysuria, frequency, burning or pain with urination. Otherwise review of  systems are negative.     PHYSICAL EXAMINATION:  Physical Exam     Vital signs: BP (!) 144/84   Pulse 72   Temp 97.3  F (36.3  C)   Resp 18   Ht 1.524 m (5')   Wt 42.2 kg (93 lb)   SpO2 90%   BMI 18.16 kg/m    General: Awake, Alert, oriented x3, appropriately, follows simple commands, conversant  HEENT:PERRLA, Pink conjunctiva, anicteric sclerae, dry oral mucosa  NECK: Supple  CVS:  S1  S2, without murmur or gallop.   LUNG: Clear to auscultation, No wheezes, rales or rhonci.  BACK: No kyphosis of the thoracic spine  ABDOMEN: Soft, thin, nontender to palpation, with positive bowel sounds  EXTREMITIES: Moves both upper and lower extremities with generalized weakness, 1+ pedal edema, no calf tenderness  SKIN: Bruising to bilateral lower extremities with large scabbed area on the left lower extremity.  Previous skin tear.  NEUROLOGIC: Intact, pulses palpable  PSYCHIATRIC: Cognitive impairment noted.  Patient unable to recall medications that she takes.      Labs:  All labs reviewed in the nursing home record and [facility]   @  Lab Results   Component Value Date    WBC 9.5 05/16/2022     Lab Results   Component Value Date    RBC 4.08 05/16/2022     Lab Results   Component Value Date    HGB 13.3 05/16/2022     Lab Results   Component Value Date    HCT 37.9 05/16/2022     Lab Results   Component Value Date    MCV 93 05/16/2022     Lab Results   Component Value Date    MCH 32.6 05/16/2022     Lab Results   Component Value Date    MCHC 35.1 05/16/2022     Lab Results   Component Value Date    RDW 13.4 05/16/2022     Lab Results   Component Value Date     05/16/2022        @Last Comprehensive Metabolic Panel:  Sodium   Date Value Ref Range Status   05/16/2022 133 (L) 136 - 145 mmol/L Final     Potassium   Date Value Ref Range Status   05/16/2022 3.5 3.5 - 5.0 mmol/L Final     Chloride   Date Value Ref Range Status   05/16/2022 96 (L) 98 - 107 mmol/L Final     Carbon Dioxide (CO2)   Date Value Ref Range Status    05/16/2022 25 22 - 31 mmol/L Final     Anion Gap   Date Value Ref Range Status   05/16/2022 12 5 - 18 mmol/L Final     Glucose   Date Value Ref Range Status   05/16/2022 89 70 - 125 mg/dL Final     Urea Nitrogen   Date Value Ref Range Status   05/16/2022 24 8 - 28 mg/dL Final     Creatinine   Date Value Ref Range Status   05/16/2022 0.75 0.60 - 1.10 mg/dL Final     GFR Estimate   Date Value Ref Range Status   05/16/2022 77 >60 mL/min/1.73m2 Final     Comment:     Effective December 21, 2021 eGFRcr in adults is calculated using the 2021 CKD-EPI creatinine equation which includes age and gender (Neeru et al., NEJ, DOI: 10.1056/UXJObr9908374)   02/23/2018 >60 >60 mL/min/1.73m2 Final     Calcium   Date Value Ref Range Status   05/16/2022 9.1 8.5 - 10.5 mg/dL Final     Assessment/plan:      ICD-10-CM    1. Closed fracture of multiple pubic rami, left, sequela  S32.592S  Scheduled Tylenol 650 mg p.o. every 6 hours.  Okay for PT OT eval and treat.  Follow-up CBC.   2. Protein-calorie malnutrition, unspecified severity (H)  E46  RD consult.   3. Bronchitis  J40  patient completed a 7-day course of Augmentin.  She continues with a very coarse wet nonproductive cough.  CT of the chest negative during hospitalization.  Duo nebs 3 times daily x5 days.  Encourage cough and deep breathing.  Incentive spirometer every 4 hours while awake.   4. Hypertension, unspecified type  I10  satisfactory.  Hydrochlorothiazide discontinued during hospitalization.   5. Hyponatremia  E87.1  133.  Hydrochlorothiazide discontinued.  Follow-up BMP in area.   6. Other secondary osteoarthritis of multiple sites  M15.3  Start Tylenol.     45 minutes spent of which greater than 50% was face to face communication with the patient and her family about pain management, nebulizer treatment as well as antihypertensives and low sodium.      This note has been dictated using voice recognition software. Any grammatical or context distortions are  unintentional and inherent to the software    Electronically signed by: Daisy Watts, CNP

## 2022-05-22 DIAGNOSIS — R52 PAIN: Primary | ICD-10-CM

## 2022-05-23 ENCOUNTER — TRANSITIONAL CARE UNIT VISIT (OUTPATIENT)
Dept: GERIATRICS | Facility: CLINIC | Age: 87
End: 2022-05-23
Payer: COMMERCIAL

## 2022-05-23 VITALS
WEIGHT: 93 LBS | SYSTOLIC BLOOD PRESSURE: 173 MMHG | OXYGEN SATURATION: 95 % | BODY MASS INDEX: 18.26 KG/M2 | HEIGHT: 60 IN | TEMPERATURE: 97.6 F | RESPIRATION RATE: 18 BRPM | DIASTOLIC BLOOD PRESSURE: 89 MMHG | HEART RATE: 78 BPM

## 2022-05-23 DIAGNOSIS — E46 PROTEIN-CALORIE MALNUTRITION, UNSPECIFIED SEVERITY (H): ICD-10-CM

## 2022-05-23 DIAGNOSIS — J40 BRONCHITIS: ICD-10-CM

## 2022-05-23 DIAGNOSIS — E87.1 HYPONATREMIA: ICD-10-CM

## 2022-05-23 DIAGNOSIS — I10 HYPERTENSION, UNSPECIFIED TYPE: ICD-10-CM

## 2022-05-23 DIAGNOSIS — S32.592S CLOSED FRACTURE OF MULTIPLE PUBIC RAMI, LEFT, SEQUELA: Primary | ICD-10-CM

## 2022-05-23 DIAGNOSIS — M15.3 OTHER SECONDARY OSTEOARTHRITIS OF MULTIPLE SITES: ICD-10-CM

## 2022-05-23 PROCEDURE — 99309 SBSQ NF CARE MODERATE MDM 30: CPT | Performed by: NURSE PRACTITIONER

## 2022-05-23 RX ORDER — TRAMADOL HYDROCHLORIDE 50 MG/1
TABLET ORAL
Qty: 45 TABLET | Refills: 0 | Status: SHIPPED | OUTPATIENT
Start: 2022-05-23 | End: 2022-06-07

## 2022-05-23 NOTE — PROGRESS NOTES
[facility]    Code Status:  FULL CODE   Visit Type:   Chief Complaint   Patient presents with     Nursing Home Acute     TCU Follow up     Facility:  [facility] [place] ([facility]) [[id number]]           Transitional Care Course: Crystal Ibarra is a 86 year old female who I am seeing today for for follow-up on the TCU.  Patient recently hospitalized on 5/11/2022 status post fall with fracture of the superior pubic Ramus on the left.  Past medical history includes multiple falls, SAH, hypertension, hyponatremia, recent pneumonia/bronchitis and frequent PVCs/bigeminy.  Patient had a mechanical fall on 5/11 in which she tripped and fell backwards.  She presented with significant left hip pain and underwent CT which showed a lateral compression type I left pelvic ring fracture with a small left pelvic sidewall hematoma.  Orthopedics was consulted and treated patient conservatively.  Patient weightbearing as tolerated.  Essential hypertension.  She continues on lisinopril and amlodipine.  Her hydrochlorothiazide was discontinued due to hyponatremia.  Sodium 130.  Acute hypoxic respiratory failure.  Patient had been seen by her PCP on 5/9/2022 with 3 weeks of cough and was treated for possible bronchitis.  She completed her Augmentin of 7 days.  D-dimer was slightly elevated.  She underwent CT of the chest which was negative for PE.  She was initially treated with oxygen but weaned off.  Frequent PVCs/bigeminy.  Patient asymptomatic.  Echo reviewed which showed some LA dilatation otherwise normal LV and RV function.  She did have some tricuspid regurgitation.  Protein malnutrition.  Patient very thin.  It was suggested for primary care provider that she start on boost.    On today's visit patient sitting up in wheelchair. Patient with recent pelvic fracture.  Follow-up x-rays showed continued displaced fracture.  Results sent to Ortho.  Late last week I was phoned in regards to increased pain.  Patient started  on tramadol 25 mg 3 times daily as needed.  She also continues on Tylenol.  Patient with underlying cognitive impairment.  Hypertension.  Blood pressure satisfactory.  Recent bronchitis.  She completed her oral antibiotics.  Nursing staff notified the on-call secondary to thick with coarse cough.  X-ray obtained which was negative other than atelectasis.  She was treated with nebulizer over the last 5 days.  Lung sounds clear.  She does continue with occasional cough.  Chronic hyponatremia.  Sodium 133.  She is on a fluid restriction.  Poor oral intake.  Continues on supplement.    Active Ambulatory Problems     Diagnosis Date Noted     Subarachnoid bleed (H) 02/21/2018     SAH (subarachnoid hemorrhage) (H) 02/21/2018     Hypertensive emergency 02/21/2018     Closed fracture of greater trochanter of left femur (H) 02/21/2018     Scalp laceration 03/06/2018     Fracture of superior pubic ramus, left, closed, initial encounter (H) 05/11/2022     Closed fracture of left inferior pubic ramus, initial encounter (H) 05/11/2022     Scoliosis 05/18/2022     Protein-calorie malnutrition, unspecified severity (H) 05/09/2022     Osteoporosis 05/18/2022     OA (osteoarthritis) 05/18/2022     HTN (hypertension) 05/18/2022     Resolved Ambulatory Problems     Diagnosis Date Noted     No Resolved Ambulatory Problems     No Additional Past Medical History       Current Outpatient Medications:      acetaminophen (TYLENOL) 325 MG tablet, Take 650 mg by mouth every 6 hours, Disp: , Rfl:      amLODIPine (NORVASC) 5 MG tablet, [AMLODIPINE (NORVASC) 5 MG TABLET] Take 1 tablet (5 mg total) by mouth daily., Disp: 30 tablet, Rfl: 1     b complex vitamins tablet, [B COMPLEX VITAMINS TABLET] Take 1 tablet by mouth daily., Disp: , Rfl:      ipratropium - albuterol 0.5 mg/2.5 mg/3 mL (DUONEB) 0.5-2.5 (3) MG/3ML neb solution, Take 1 vial by nebulization 3 times daily, Disp: , Rfl:      lisinopril (PRINIVIL,ZESTRIL) 20 MG tablet, [LISINOPRIL  (PRINIVIL,ZESTRIL) 20 MG TABLET] Take 20 mg by mouth daily., Disp: , Rfl:      MULTIVITAMIN WITH MINERALS (HAIR,SKIN AND NAILS ORAL), [MULTIVITAMIN WITH MINERALS (HAIR,SKIN AND NAILS ORAL)] Take 1 tablet by mouth daily., Disp: , Rfl:      OMEGA-3/DHA/EPA/FISH OIL (FISH OIL-OMEGA-3 FATTY ACIDS) 300-1,000 mg capsule, [OMEGA-3/DHA/EPA/FISH OIL (FISH OIL-OMEGA-3 FATTY ACIDS) 300-1,000 MG CAPSULE] Take 1 g by mouth daily., Disp: , Rfl:      traMADol (ULTRAM) 50 MG tablet, TAKE ONE-HALF (1/2) TABLET (25MG) BY MOUTH THREE TIMES DAILY AS NEEDED, Disp: 45 tablet, Rfl: 0  Allergies   Allergen Reactions     Atenolol      Other reaction(s): Sedation  Intol. Fatigue  At 50 mg or more  25 mg OK       All Meds and Allergies reviewed in the record at the facility and is the most up-to-date.    Current Outpatient Medications   Medication Sig     acetaminophen (TYLENOL) 325 MG tablet Take 650 mg by mouth every 6 hours     amLODIPine (NORVASC) 5 MG tablet [AMLODIPINE (NORVASC) 5 MG TABLET] Take 1 tablet (5 mg total) by mouth daily.     b complex vitamins tablet [B COMPLEX VITAMINS TABLET] Take 1 tablet by mouth daily.     ipratropium - albuterol 0.5 mg/2.5 mg/3 mL (DUONEB) 0.5-2.5 (3) MG/3ML neb solution Take 1 vial by nebulization 3 times daily     lisinopril (PRINIVIL,ZESTRIL) 20 MG tablet [LISINOPRIL (PRINIVIL,ZESTRIL) 20 MG TABLET] Take 20 mg by mouth daily.     MULTIVITAMIN WITH MINERALS (HAIR,SKIN AND NAILS ORAL) [MULTIVITAMIN WITH MINERALS (HAIR,SKIN AND NAILS ORAL)] Take 1 tablet by mouth daily.     OMEGA-3/DHA/EPA/FISH OIL (FISH OIL-OMEGA-3 FATTY ACIDS) 300-1,000 mg capsule [OMEGA-3/DHA/EPA/FISH OIL (FISH OIL-OMEGA-3 FATTY ACIDS) 300-1,000 MG CAPSULE] Take 1 g by mouth daily.     traMADol (ULTRAM) 50 MG tablet TAKE ONE-HALF (1/2) TABLET (25MG) BY MOUTH THREE TIMES DAILY AS NEEDED     No current facility-administered medications for this visit.       REVIEW OF SYSTEMS:   Review of Systems  No fevers or chills. No headache,  lightheadedness or dizziness. No SOB, chest pains or palpitations. Appetite is good. No nausea, vomiting, constipation or diarrhea. No dysuria, frequency, burning or pain with urination. Otherwise review of systems are negative.     PHYSICAL EXAMINATION:  Physical Exam     Vital signs: BP (!) 173/89   Pulse 78   Temp 97.6  F (36.4  C)   Resp 18   Ht 1.524 m (5')   Wt 42.2 kg (93 lb)   SpO2 95%   BMI 18.16 kg/m    General: Awake, Alert, oriented x3, appropriately, follows simple commands  HEENT: Pink conjunctiva, anicteric sclerae, moist oral mucosa  NECK: Supple  CVS:  S1  S2, without murmur or gallop.   LUNG: Clear to auscultation, No wheezes, rales or rhonci.  BACK: No kyphosis of the thoracic spine  ABDOMEN: Soft, nontender to palpation, with positive bowel sounds  EXTREMITIES: Moves both upper and lower extremities with generalized weakness.  No pedal edema, no calf tenderness  SKIN: Bruising to bilateral lower extremities with large scabbed area on the left lower extremity.  NEUROLOGIC: Intact, pulses palpable  PSYCHIATRIC: Cognitive impairment noted.      Labs:  All labs reviewed in the nursing home record and Epic   @  Lab Results   Component Value Date    WBC 9.5 05/16/2022     Lab Results   Component Value Date    RBC 4.08 05/16/2022     Lab Results   Component Value Date    HGB 13.3 05/16/2022     Lab Results   Component Value Date    HCT 37.9 05/16/2022     Lab Results   Component Value Date    MCV 93 05/16/2022     Lab Results   Component Value Date    MCH 32.6 05/16/2022     Lab Results   Component Value Date    MCHC 35.1 05/16/2022     Lab Results   Component Value Date    RDW 13.4 05/16/2022     Lab Results   Component Value Date     05/16/2022        @Last Comprehensive Metabolic Panel:  Sodium   Date Value Ref Range Status   05/19/2022 132 (L) 136 - 145 mmol/L Final     Potassium   Date Value Ref Range Status   05/19/2022 3.7 3.5 - 5.0 mmol/L Final     Chloride   Date Value Ref Range  Status   05/19/2022 95 (L) 98 - 107 mmol/L Final     Carbon Dioxide (CO2)   Date Value Ref Range Status   05/19/2022 26 22 - 31 mmol/L Final     Anion Gap   Date Value Ref Range Status   05/19/2022 11 5 - 18 mmol/L Final     Glucose   Date Value Ref Range Status   05/19/2022 89 70 - 125 mg/dL Final     Urea Nitrogen   Date Value Ref Range Status   05/19/2022 31 (H) 8 - 28 mg/dL Final     Creatinine   Date Value Ref Range Status   05/19/2022 0.78 0.60 - 1.10 mg/dL Final     GFR Estimate   Date Value Ref Range Status   05/19/2022 74 >60 mL/min/1.73m2 Final     Comment:     Effective December 21, 2021 eGFRcr in adults is calculated using the 2021 CKD-EPI creatinine equation which includes age and gender (Neeru et al., NE, DOI: 10.1056/VFVTxj9091999)   02/23/2018 >60 >60 mL/min/1.73m2 Final     Calcium   Date Value Ref Range Status   05/19/2022 9.5 8.5 - 10.5 mg/dL Final     Assessment/plan:      ICD-10-CM    1. Closed fracture of multiple pubic rami, left, sequela  S32.592S  Scheduled Tylenol 650 mg p.o. every 6 hours.  Recent addition of tramadol 25 mg 3 times daily as needed.  Follow-up CBC unremarkable.  Therapy to evaluate for pain modalities.   2. Protein-calorie malnutrition, unspecified severity (H)  E46  RD consult.   3. Bronchitis  J40  patient completed a 7-day course of Augmentin.  She continues with a very coarse wet nonproductive cough.  CT of the chest negative during hospitalization.  Duo nebs 3 times daily x5 days completed.  Encourage cough and deep breathing.  Incentive spirometer every 4 hours while awake.  Chest x-ray obtained which showed atelectasis.   4. Hypertension, unspecified type  I10  satisfactory.  Hydrochlorothiazide discontinued during hospitalization.   5. Hyponatremia  E87.1  132.  Hydrochlorothiazide discontinued.     6. 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     45 minutes spent of which greater than 50% was face to face communication with the  patient and her family about pain management, nebulizer treatment as well as antihypertensives and low sodium.      This note has been dictated using voice recognition software. Any grammatical or context distortions are unintentional and inherent to the software    Electronically signed by: Daisy Watts CNP

## 2022-05-23 NOTE — LETTER
5/23/2022        RE: Crystal Ibarra  [address]  [place] [address]        [facility]    Code Status:  FULL CODE   Visit Type:   Chief Complaint   Patient presents with     Nursing Home Acute     TCU Follow up     Facility:  [facility] [place] ([facility]) [[id number]]           Transitional Care Course: Crystal Ibarra is a 86 year old female who I am seeing today for for follow-up on the TCU.  Patient recently hospitalized on 5/11/2022 status post fall with fracture of the superior pubic Ramus on the left.  Past medical history includes multiple falls, SAH, hypertension, hyponatremia, recent pneumonia/bronchitis and frequent PVCs/bigeminy.  Patient had a mechanical fall on 5/11 in which she tripped and fell backwards.  She presented with significant left hip pain and underwent CT which showed a lateral compression type I left pelvic ring fracture with a small left pelvic sidewall hematoma.  Orthopedics was consulted and treated patient conservatively.  Patient weightbearing as tolerated.  Essential hypertension.  She continues on lisinopril and amlodipine.  Her hydrochlorothiazide was discontinued due to hyponatremia.  Sodium 130.  Acute hypoxic respiratory failure.  Patient had been seen by her PCP on 5/9/2022 with 3 weeks of cough and was treated for possible bronchitis.  She completed her Augmentin of 7 days.  D-dimer was slightly elevated.  She underwent CT of the chest which was negative for PE.  She was initially treated with oxygen but weaned off.  Frequent PVCs/bigeminy.  Patient asymptomatic.  Echo reviewed which showed some LA dilatation otherwise normal LV and RV function.  She did have some tricuspid regurgitation.  Protein malnutrition.  Patient very thin.  It was suggested for primary care provider that she start on boost.    On today's visit patient sitting up in wheelchair. Patient with recent pelvic fracture.  Follow-up x-rays showed continued displaced fracture.   Results sent to Ortho.  Late last week I was phoned in regards to increased pain.  Patient started on tramadol 25 mg 3 times daily as needed.  She also continues on Tylenol.  Patient with underlying cognitive impairment.  Hypertension.  Blood pressure satisfactory.  Recent bronchitis.  She completed her oral antibiotics.  Nursing staff notified the on-call secondary to thick with coarse cough.  X-ray obtained which was negative other than atelectasis.  She was treated with nebulizer over the last 5 days.  Lung sounds clear.  She does continue with occasional cough.  Chronic hyponatremia.  Sodium 133.  She is on a fluid restriction.  Poor oral intake.  Continues on supplement.    Active Ambulatory Problems     Diagnosis Date Noted     Subarachnoid bleed (H) 02/21/2018     SAH (subarachnoid hemorrhage) (H) 02/21/2018     Hypertensive emergency 02/21/2018     Closed fracture of greater trochanter of left femur (H) 02/21/2018     Scalp laceration 03/06/2018     Fracture of superior pubic ramus, left, closed, initial encounter (H) 05/11/2022     Closed fracture of left inferior pubic ramus, initial encounter (H) 05/11/2022     Scoliosis 05/18/2022     Protein-calorie malnutrition, unspecified severity (H) 05/09/2022     Osteoporosis 05/18/2022     OA (osteoarthritis) 05/18/2022     HTN (hypertension) 05/18/2022     Resolved Ambulatory Problems     Diagnosis Date Noted     No Resolved Ambulatory Problems     No Additional Past Medical History       Current Outpatient Medications:      acetaminophen (TYLENOL) 325 MG tablet, Take 650 mg by mouth every 6 hours, Disp: , Rfl:      amLODIPine (NORVASC) 5 MG tablet, [AMLODIPINE (NORVASC) 5 MG TABLET] Take 1 tablet (5 mg total) by mouth daily., Disp: 30 tablet, Rfl: 1     b complex vitamins tablet, [B COMPLEX VITAMINS TABLET] Take 1 tablet by mouth daily., Disp: , Rfl:      ipratropium - albuterol 0.5 mg/2.5 mg/3 mL (DUONEB) 0.5-2.5 (3) MG/3ML neb solution, Take 1 vial by  nebulization 3 times daily, Disp: , Rfl:      lisinopril (PRINIVIL,ZESTRIL) 20 MG tablet, [LISINOPRIL (PRINIVIL,ZESTRIL) 20 MG TABLET] Take 20 mg by mouth daily., Disp: , Rfl:      MULTIVITAMIN WITH MINERALS (HAIR,SKIN AND NAILS ORAL), [MULTIVITAMIN WITH MINERALS (HAIR,SKIN AND NAILS ORAL)] Take 1 tablet by mouth daily., Disp: , Rfl:      OMEGA-3/DHA/EPA/FISH OIL (FISH OIL-OMEGA-3 FATTY ACIDS) 300-1,000 mg capsule, [OMEGA-3/DHA/EPA/FISH OIL (FISH OIL-OMEGA-3 FATTY ACIDS) 300-1,000 MG CAPSULE] Take 1 g by mouth daily., Disp: , Rfl:      traMADol (ULTRAM) 50 MG tablet, TAKE ONE-HALF (1/2) TABLET (25MG) BY MOUTH THREE TIMES DAILY AS NEEDED, Disp: 45 tablet, Rfl: 0  Allergies   Allergen Reactions     Atenolol      Other reaction(s): Sedation  Intol. Fatigue  At 50 mg or more  25 mg OK       All Meds and Allergies reviewed in the record at the facility and is the most up-to-date.    Current Outpatient Medications   Medication Sig     acetaminophen (TYLENOL) 325 MG tablet Take 650 mg by mouth every 6 hours     amLODIPine (NORVASC) 5 MG tablet [AMLODIPINE (NORVASC) 5 MG TABLET] Take 1 tablet (5 mg total) by mouth daily.     b complex vitamins tablet [B COMPLEX VITAMINS TABLET] Take 1 tablet by mouth daily.     ipratropium - albuterol 0.5 mg/2.5 mg/3 mL (DUONEB) 0.5-2.5 (3) MG/3ML neb solution Take 1 vial by nebulization 3 times daily     lisinopril (PRINIVIL,ZESTRIL) 20 MG tablet [LISINOPRIL (PRINIVIL,ZESTRIL) 20 MG TABLET] Take 20 mg by mouth daily.     MULTIVITAMIN WITH MINERALS (HAIR,SKIN AND NAILS ORAL) [MULTIVITAMIN WITH MINERALS (HAIR,SKIN AND NAILS ORAL)] Take 1 tablet by mouth daily.     OMEGA-3/DHA/EPA/FISH OIL (FISH OIL-OMEGA-3 FATTY ACIDS) 300-1,000 mg capsule [OMEGA-3/DHA/EPA/FISH OIL (FISH OIL-OMEGA-3 FATTY ACIDS) 300-1,000 MG CAPSULE] Take 1 g by mouth daily.     traMADol (ULTRAM) 50 MG tablet TAKE ONE-HALF (1/2) TABLET (25MG) BY MOUTH THREE TIMES DAILY AS NEEDED     No current facility-administered  medications for this visit.       REVIEW OF SYSTEMS:   Review of Systems  No fevers or chills. No headache, lightheadedness or dizziness. No SOB, chest pains or palpitations. Appetite is good. No nausea, vomiting, constipation or diarrhea. No dysuria, frequency, burning or pain with urination. Otherwise review of systems are negative.     PHYSICAL EXAMINATION:  Physical Exam     Vital signs: BP (!) 173/89   Pulse 78   Temp 97.6  F (36.4  C)   Resp 18   Ht 1.524 m (5')   Wt 42.2 kg (93 lb)   SpO2 95%   BMI 18.16 kg/m    General: Awake, Alert, oriented x3, appropriately, follows simple commands  HEENT: Pink conjunctiva, anicteric sclerae, moist oral mucosa  NECK: Supple  CVS:  S1  S2, without murmur or gallop.   LUNG: Clear to auscultation, No wheezes, rales or rhonci.  BACK: No kyphosis of the thoracic spine  ABDOMEN: Soft, nontender to palpation, with positive bowel sounds  EXTREMITIES: Moves both upper and lower extremities with generalized weakness.  No pedal edema, no calf tenderness  SKIN: Bruising to bilateral lower extremities with large scabbed area on the left lower extremity.  NEUROLOGIC: Intact, pulses palpable  PSYCHIATRIC: Cognitive impairment noted.      Labs:  All labs reviewed in the nursing home record and Epic   @  Lab Results   Component Value Date    WBC 9.5 05/16/2022     Lab Results   Component Value Date    RBC 4.08 05/16/2022     Lab Results   Component Value Date    HGB 13.3 05/16/2022     Lab Results   Component Value Date    HCT 37.9 05/16/2022     Lab Results   Component Value Date    MCV 93 05/16/2022     Lab Results   Component Value Date    MCH 32.6 05/16/2022     Lab Results   Component Value Date    MCHC 35.1 05/16/2022     Lab Results   Component Value Date    RDW 13.4 05/16/2022     Lab Results   Component Value Date     05/16/2022        @Last Comprehensive Metabolic Panel:  Sodium   Date Value Ref Range Status   05/19/2022 132 (L) 136 - 145 mmol/L Final     Potassium    Date Value Ref Range Status   05/19/2022 3.7 3.5 - 5.0 mmol/L Final     Chloride   Date Value Ref Range Status   05/19/2022 95 (L) 98 - 107 mmol/L Final     Carbon Dioxide (CO2)   Date Value Ref Range Status   05/19/2022 26 22 - 31 mmol/L Final     Anion Gap   Date Value Ref Range Status   05/19/2022 11 5 - 18 mmol/L Final     Glucose   Date Value Ref Range Status   05/19/2022 89 70 - 125 mg/dL Final     Urea Nitrogen   Date Value Ref Range Status   05/19/2022 31 (H) 8 - 28 mg/dL Final     Creatinine   Date Value Ref Range Status   05/19/2022 0.78 0.60 - 1.10 mg/dL Final     GFR Estimate   Date Value Ref Range Status   05/19/2022 74 >60 mL/min/1.73m2 Final     Comment:     Effective December 21, 2021 eGFRcr in adults is calculated using the 2021 CKD-EPI creatinine equation which includes age and gender (Neeru et al., NEJ, DOI: 10.1056/HYCYxg0409955)   02/23/2018 >60 >60 mL/min/1.73m2 Final     Calcium   Date Value Ref Range Status   05/19/2022 9.5 8.5 - 10.5 mg/dL Final     Assessment/plan:      ICD-10-CM    1. Closed fracture of multiple pubic rami, left, sequela  S32.592S  Scheduled Tylenol 650 mg p.o. every 6 hours.  Recent addition of tramadol 25 mg 3 times daily as needed.  Follow-up CBC unremarkable.  Therapy to evaluate for pain modalities.   2. Protein-calorie malnutrition, unspecified severity (H)  E46  RD consult.   3. Bronchitis  J40  patient completed a 7-day course of Augmentin.  She continues with a very coarse wet nonproductive cough.  CT of the chest negative during hospitalization.  Duo nebs 3 times daily x5 days completed.  Encourage cough and deep breathing.  Incentive spirometer every 4 hours while awake.  Chest x-ray obtained which showed atelectasis.   4. Hypertension, unspecified type  I10  satisfactory.  Hydrochlorothiazide discontinued during hospitalization.   5. Hyponatremia  E87.1  132.  Hydrochlorothiazide discontinued.     6. 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     45 minutes spent of which greater than 50% was face to face communication with the patient and her family about pain management, nebulizer treatment as well as antihypertensives and low sodium.      This note has been dictated using voice recognition software. Any grammatical or context distortions are unintentional and inherent to the software    Electronically signed by: Daisy Watts CNP           Sincerely,        Daisy Watts NP

## 2022-05-26 ENCOUNTER — TRANSITIONAL CARE UNIT VISIT (OUTPATIENT)
Dept: GERIATRICS | Facility: CLINIC | Age: 87
End: 2022-05-26
Payer: COMMERCIAL

## 2022-05-26 VITALS
HEIGHT: 60 IN | BODY MASS INDEX: 18.26 KG/M2 | HEART RATE: 89 BPM | WEIGHT: 93 LBS | DIASTOLIC BLOOD PRESSURE: 80 MMHG | RESPIRATION RATE: 16 BRPM | TEMPERATURE: 97.8 F | SYSTOLIC BLOOD PRESSURE: 121 MMHG | OXYGEN SATURATION: 95 %

## 2022-05-26 DIAGNOSIS — S32.592S CLOSED FRACTURE OF MULTIPLE PUBIC RAMI, LEFT, SEQUELA: Primary | ICD-10-CM

## 2022-05-26 DIAGNOSIS — E87.1 HYPONATREMIA: ICD-10-CM

## 2022-05-26 DIAGNOSIS — I10 HYPERTENSION, UNSPECIFIED TYPE: ICD-10-CM

## 2022-05-26 DIAGNOSIS — J40 BRONCHITIS: ICD-10-CM

## 2022-05-26 DIAGNOSIS — E46 PROTEIN-CALORIE MALNUTRITION, UNSPECIFIED SEVERITY (H): ICD-10-CM

## 2022-05-26 PROCEDURE — 99309 SBSQ NF CARE MODERATE MDM 30: CPT | Performed by: NURSE PRACTITIONER

## 2022-05-26 NOTE — LETTER
5/26/2022        RE: Crystal Ibarra  [address]  [place] [address]        [facility]    Code Status:  FULL CODE   Visit Type:   Chief Complaint   Patient presents with     Nursing Home Acute     TCU Follow up     Facility:  [facility] [place] ([facility]) [[id number]]           Transitional Care Course: Crystal Ibarra is a 86 year old female who I am seeing today for for follow-up on the TCU.  Patient recently hospitalized on 5/11/2022 status post fall with fracture of the superior pubic Ramus on the left.  Past medical history includes multiple falls, SAH, hypertension, hyponatremia, recent pneumonia/bronchitis and frequent PVCs/bigeminy.  Patient had a mechanical fall on 5/11 in which she tripped and fell backwards.  She presented with significant left hip pain and underwent CT which showed a lateral compression type I left pelvic ring fracture with a small left pelvic sidewall hematoma.  Orthopedics was consulted and treated patient conservatively.  Patient weightbearing as tolerated.  Essential hypertension.  She continues on lisinopril and amlodipine.  Her hydrochlorothiazide was discontinued due to hyponatremia.  Sodium 130.  Acute hypoxic respiratory failure.  Patient had been seen by her PCP on 5/9/2022 with 3 weeks of cough and was treated for possible bronchitis.  She completed her Augmentin of 7 days.  D-dimer was slightly elevated.  She underwent CT of the chest which was negative for PE.  She was initially treated with oxygen but weaned off.  Frequent PVCs/bigeminy.  Patient asymptomatic.  Echo reviewed which showed some LA dilatation otherwise normal LV and RV function.  She did have some tricuspid regurgitation.  Protein malnutrition.  Patient very thin.  It was suggested for primary care provider that she start on boost.    On today's visit patient sitting up in wheelchair. Pt s/p pelvic fracture. She had a follow up with ortho today. No new orders. Pt continues on  tramadol and tylenol for pain. 2+ LE edema today. Pt denies any SOB or CP. Weight stable. Her hydrochlorothiazide was discontinued during hospitalization due to hyponatremia. Sodium now 133. She has been sitting up most of the day in wheelchair with feet in dependent position. Lungs CTA. Recent bronchitis. Protein deficiency due to poor oral intake. Pt continues on supplement.       Active Ambulatory Problems     Diagnosis Date Noted     Subarachnoid bleed (H) 02/21/2018     SAH (subarachnoid hemorrhage) (H) 02/21/2018     Hypertensive emergency 02/21/2018     Closed fracture of greater trochanter of left femur (H) 02/21/2018     Scalp laceration 03/06/2018     Fracture of superior pubic ramus, left, closed, initial encounter (H) 05/11/2022     Closed fracture of left inferior pubic ramus, initial encounter (H) 05/11/2022     Scoliosis 05/18/2022     Protein-calorie malnutrition, unspecified severity (H) 05/09/2022     Osteoporosis 05/18/2022     OA (osteoarthritis) 05/18/2022     HTN (hypertension) 05/18/2022     Resolved Ambulatory Problems     Diagnosis Date Noted     No Resolved Ambulatory Problems     No Additional Past Medical History       Current Outpatient Medications:      acetaminophen (TYLENOL) 325 MG tablet, Take 650 mg by mouth every 6 hours, Disp: , Rfl:      amLODIPine (NORVASC) 5 MG tablet, [AMLODIPINE (NORVASC) 5 MG TABLET] Take 1 tablet (5 mg total) by mouth daily., Disp: 30 tablet, Rfl: 1     b complex vitamins tablet, [B COMPLEX VITAMINS TABLET] Take 1 tablet by mouth daily., Disp: , Rfl:      ipratropium - albuterol 0.5 mg/2.5 mg/3 mL (DUONEB) 0.5-2.5 (3) MG/3ML neb solution, Take 1 vial by nebulization 3 times daily, Disp: , Rfl:      lisinopril (PRINIVIL,ZESTRIL) 20 MG tablet, [LISINOPRIL (PRINIVIL,ZESTRIL) 20 MG TABLET] Take 20 mg by mouth daily., Disp: , Rfl:      MULTIVITAMIN WITH MINERALS (HAIR,SKIN AND NAILS ORAL), [MULTIVITAMIN WITH MINERALS (HAIR,SKIN AND NAILS ORAL)] Take 1 tablet by  mouth daily., Disp: , Rfl:      OMEGA-3/DHA/EPA/FISH OIL (FISH OIL-OMEGA-3 FATTY ACIDS) 300-1,000 mg capsule, [OMEGA-3/DHA/EPA/FISH OIL (FISH OIL-OMEGA-3 FATTY ACIDS) 300-1,000 MG CAPSULE] Take 1 g by mouth daily., Disp: , Rfl:      traMADol (ULTRAM) 50 MG tablet, TAKE ONE-HALF (1/2) TABLET (25MG) BY MOUTH THREE TIMES DAILY AS NEEDED, Disp: 45 tablet, Rfl: 0  Allergies   Allergen Reactions     Atenolol      Other reaction(s): Sedation  Intol. Fatigue  At 50 mg or more  25 mg OK       All Meds and Allergies reviewed in the record at the facility and is the most up-to-date.    Current Outpatient Medications   Medication Sig     acetaminophen (TYLENOL) 325 MG tablet Take 650 mg by mouth every 6 hours     amLODIPine (NORVASC) 5 MG tablet [AMLODIPINE (NORVASC) 5 MG TABLET] Take 1 tablet (5 mg total) by mouth daily.     b complex vitamins tablet [B COMPLEX VITAMINS TABLET] Take 1 tablet by mouth daily.     ipratropium - albuterol 0.5 mg/2.5 mg/3 mL (DUONEB) 0.5-2.5 (3) MG/3ML neb solution Take 1 vial by nebulization 3 times daily     lisinopril (PRINIVIL,ZESTRIL) 20 MG tablet [LISINOPRIL (PRINIVIL,ZESTRIL) 20 MG TABLET] Take 20 mg by mouth daily.     MULTIVITAMIN WITH MINERALS (HAIR,SKIN AND NAILS ORAL) [MULTIVITAMIN WITH MINERALS (HAIR,SKIN AND NAILS ORAL)] Take 1 tablet by mouth daily.     OMEGA-3/DHA/EPA/FISH OIL (FISH OIL-OMEGA-3 FATTY ACIDS) 300-1,000 mg capsule [OMEGA-3/DHA/EPA/FISH OIL (FISH OIL-OMEGA-3 FATTY ACIDS) 300-1,000 MG CAPSULE] Take 1 g by mouth daily.     traMADol (ULTRAM) 50 MG tablet TAKE ONE-HALF (1/2) TABLET (25MG) BY MOUTH THREE TIMES DAILY AS NEEDED     No current facility-administered medications for this visit.       REVIEW OF SYSTEMS:   Review of Systems  No fevers or chills. No headache, lightheadedness or dizziness. No SOB, chest pains or palpitations. Appetite varies. No nausea, vomiting, constipation or diarrhea. No dysuria, frequency, burning or pain with urination. Pain controlled with  tramadol and tylenol. Otherwise review of systems are negative.     PHYSICAL EXAMINATION:  Physical Exam     Vital signs: /80   Pulse 89   Temp 97.8  F (36.6  C)   Resp 16   Ht 1.524 m (5')   Wt 42.2 kg (93 lb)   SpO2 95%   BMI 18.16 kg/m    General: Awake, Alert, oriented x3, appropriately, follows simple commands  HEENT: Pink conjunctiva, anicteric sclerae, moist oral mucosa  NECK: Supple  CVS:  S1  S2, without murmur or gallop.   LUNG: Clear to auscultation, No wheezes, rales or rhonci.  BACK: No kyphosis of the thoracic spine  ABDOMEN: Soft, thin, nontender to palpation, with positive bowel sounds  EXTREMITIES: Moves both upper and lower extremities with generalized weakness.  2+ pedal edema, no calf tenderness  SKIN: Bruising to bilateral lower extremities with large scabbed area on the left lower extremity.  NEUROLOGIC: Intact, pulses palpable  PSYCHIATRIC: Cognitive impairment noted.      Labs:  All labs reviewed in the nursing home record and Epic   @  Lab Results   Component Value Date    WBC 9.5 05/16/2022     Lab Results   Component Value Date    RBC 4.08 05/16/2022     Lab Results   Component Value Date    HGB 13.3 05/16/2022     Lab Results   Component Value Date    HCT 37.9 05/16/2022     Lab Results   Component Value Date    MCV 93 05/16/2022     Lab Results   Component Value Date    MCH 32.6 05/16/2022     Lab Results   Component Value Date    MCHC 35.1 05/16/2022     Lab Results   Component Value Date    RDW 13.4 05/16/2022     Lab Results   Component Value Date     05/16/2022        @Last Comprehensive Metabolic Panel:  Sodium   Date Value Ref Range Status   05/19/2022 132 (L) 136 - 145 mmol/L Final     Potassium   Date Value Ref Range Status   05/19/2022 3.7 3.5 - 5.0 mmol/L Final     Chloride   Date Value Ref Range Status   05/19/2022 95 (L) 98 - 107 mmol/L Final     Carbon Dioxide (CO2)   Date Value Ref Range Status   05/19/2022 26 22 - 31 mmol/L Final     Anion Gap   Date  Value Ref Range Status   05/19/2022 11 5 - 18 mmol/L Final     Glucose   Date Value Ref Range Status   05/19/2022 89 70 - 125 mg/dL Final     Urea Nitrogen   Date Value Ref Range Status   05/19/2022 31 (H) 8 - 28 mg/dL Final     Creatinine   Date Value Ref Range Status   05/19/2022 0.78 0.60 - 1.10 mg/dL Final     GFR Estimate   Date Value Ref Range Status   05/19/2022 74 >60 mL/min/1.73m2 Final     Comment:     Effective December 21, 2021 eGFRcr in adults is calculated using the 2021 CKD-EPI creatinine equation which includes age and gender (Neeru et al., NEJ, DOI: 10.1056/DQZBah3732104)   02/23/2018 >60 >60 mL/min/1.73m2 Final     Calcium   Date Value Ref Range Status   05/19/2022 9.5 8.5 - 10.5 mg/dL Final     Assessment/plan:      ICD-10-CM    1. Closed fracture of multiple pubic rami, left, sequela  S32.592S  Scheduled Tylenol 650 mg p.o. every 6 hours.  Continue tramadol 25 mg 3 times daily as needed.  Follow up with ortho today. No new orders.   Follow-up CBC unremarkable.  Edema to BLE. Tubi  on in am/off HS.   Encourage elevation.    2. Protein-calorie malnutrition, unspecified severity (H)  E46  RD consult.   3. Bronchitis  J40  patient completed a 7-day course of Augmentin.  She continues with a very coarse wet nonproductive cough.  CT of the chest negative during hospitalization.  Duo nebs 3 times daily x5 days completed.  Encourage cough and deep breathing.  Incentive spirometer every 4 hours while awake.  Chest x-ray obtained which showed atelectasis.   4. Hypertension, unspecified type  I10  satisfactory.  Hydrochlorothiazide discontinued during hospitalization.   5. Hyponatremia  E87.1  132.  Hydrochlorothiazide discontinued.     6. 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     45 minutes spent of which greater than 50% was face to face communication with the patient and her family about pain management, nebulizer treatment as well as antihypertensives and low  sodium.      This note has been dictated using voice recognition software. Any grammatical or context distortions are unintentional and inherent to the software    Electronically signed by: Daisy Watts CNP           Sincerely,        Daisy Watts NP

## 2022-05-27 NOTE — PROGRESS NOTES
[facility]    Code Status:  FULL CODE   Visit Type:   Chief Complaint   Patient presents with     Nursing Home Acute     TCU Follow up     Facility:  [facility] [place] ([facility]) [[id number]]           Transitional Care Course: Crystal Ibarra is a 86 year old female who I am seeing today for for follow-up on the TCU.  Patient recently hospitalized on 5/11/2022 status post fall with fracture of the superior pubic Ramus on the left.  Past medical history includes multiple falls, SAH, hypertension, hyponatremia, recent pneumonia/bronchitis and frequent PVCs/bigeminy.  Patient had a mechanical fall on 5/11 in which she tripped and fell backwards.  She presented with significant left hip pain and underwent CT which showed a lateral compression type I left pelvic ring fracture with a small left pelvic sidewall hematoma.  Orthopedics was consulted and treated patient conservatively.  Patient weightbearing as tolerated.  Essential hypertension.  She continues on lisinopril and amlodipine.  Her hydrochlorothiazide was discontinued due to hyponatremia.  Sodium 130.  Acute hypoxic respiratory failure.  Patient had been seen by her PCP on 5/9/2022 with 3 weeks of cough and was treated for possible bronchitis.  She completed her Augmentin of 7 days.  D-dimer was slightly elevated.  She underwent CT of the chest which was negative for PE.  She was initially treated with oxygen but weaned off.  Frequent PVCs/bigeminy.  Patient asymptomatic.  Echo reviewed which showed some LA dilatation otherwise normal LV and RV function.  She did have some tricuspid regurgitation.  Protein malnutrition.  Patient very thin.  It was suggested for primary care provider that she start on boost.    On today's visit patient sitting up in wheelchair. Pt s/p pelvic fracture. She had a follow up with ortho today. No new orders. Pt continues on tramadol and tylenol for pain. 2+ LE edema today. Pt denies any SOB or CP. Weight stable. Her  hydrochlorothiazide was discontinued during hospitalization due to hyponatremia. Sodium now 133. She has been sitting up most of the day in wheelchair with feet in dependent position. Lungs CTA. Recent bronchitis. Protein deficiency due to poor oral intake. Pt continues on supplement.       Active Ambulatory Problems     Diagnosis Date Noted     Subarachnoid bleed (H) 02/21/2018     SAH (subarachnoid hemorrhage) (H) 02/21/2018     Hypertensive emergency 02/21/2018     Closed fracture of greater trochanter of left femur (H) 02/21/2018     Scalp laceration 03/06/2018     Fracture of superior pubic ramus, left, closed, initial encounter (H) 05/11/2022     Closed fracture of left inferior pubic ramus, initial encounter (H) 05/11/2022     Scoliosis 05/18/2022     Protein-calorie malnutrition, unspecified severity (H) 05/09/2022     Osteoporosis 05/18/2022     OA (osteoarthritis) 05/18/2022     HTN (hypertension) 05/18/2022     Resolved Ambulatory Problems     Diagnosis Date Noted     No Resolved Ambulatory Problems     No Additional Past Medical History       Current Outpatient Medications:      acetaminophen (TYLENOL) 325 MG tablet, Take 650 mg by mouth every 6 hours, Disp: , Rfl:      amLODIPine (NORVASC) 5 MG tablet, [AMLODIPINE (NORVASC) 5 MG TABLET] Take 1 tablet (5 mg total) by mouth daily., Disp: 30 tablet, Rfl: 1     b complex vitamins tablet, [B COMPLEX VITAMINS TABLET] Take 1 tablet by mouth daily., Disp: , Rfl:      ipratropium - albuterol 0.5 mg/2.5 mg/3 mL (DUONEB) 0.5-2.5 (3) MG/3ML neb solution, Take 1 vial by nebulization 3 times daily, Disp: , Rfl:      lisinopril (PRINIVIL,ZESTRIL) 20 MG tablet, [LISINOPRIL (PRINIVIL,ZESTRIL) 20 MG TABLET] Take 20 mg by mouth daily., Disp: , Rfl:      MULTIVITAMIN WITH MINERALS (HAIR,SKIN AND NAILS ORAL), [MULTIVITAMIN WITH MINERALS (HAIR,SKIN AND NAILS ORAL)] Take 1 tablet by mouth daily., Disp: , Rfl:      OMEGA-3/DHA/EPA/FISH OIL (FISH OIL-OMEGA-3 FATTY ACIDS)  300-1,000 mg capsule, [OMEGA-3/DHA/EPA/FISH OIL (FISH OIL-OMEGA-3 FATTY ACIDS) 300-1,000 MG CAPSULE] Take 1 g by mouth daily., Disp: , Rfl:      traMADol (ULTRAM) 50 MG tablet, TAKE ONE-HALF (1/2) TABLET (25MG) BY MOUTH THREE TIMES DAILY AS NEEDED, Disp: 45 tablet, Rfl: 0  Allergies   Allergen Reactions     Atenolol      Other reaction(s): Sedation  Intol. Fatigue  At 50 mg or more  25 mg OK       All Meds and Allergies reviewed in the record at the facility and is the most up-to-date.    Current Outpatient Medications   Medication Sig     acetaminophen (TYLENOL) 325 MG tablet Take 650 mg by mouth every 6 hours     amLODIPine (NORVASC) 5 MG tablet [AMLODIPINE (NORVASC) 5 MG TABLET] Take 1 tablet (5 mg total) by mouth daily.     b complex vitamins tablet [B COMPLEX VITAMINS TABLET] Take 1 tablet by mouth daily.     ipratropium - albuterol 0.5 mg/2.5 mg/3 mL (DUONEB) 0.5-2.5 (3) MG/3ML neb solution Take 1 vial by nebulization 3 times daily     lisinopril (PRINIVIL,ZESTRIL) 20 MG tablet [LISINOPRIL (PRINIVIL,ZESTRIL) 20 MG TABLET] Take 20 mg by mouth daily.     MULTIVITAMIN WITH MINERALS (HAIR,SKIN AND NAILS ORAL) [MULTIVITAMIN WITH MINERALS (HAIR,SKIN AND NAILS ORAL)] Take 1 tablet by mouth daily.     OMEGA-3/DHA/EPA/FISH OIL (FISH OIL-OMEGA-3 FATTY ACIDS) 300-1,000 mg capsule [OMEGA-3/DHA/EPA/FISH OIL (FISH OIL-OMEGA-3 FATTY ACIDS) 300-1,000 MG CAPSULE] Take 1 g by mouth daily.     traMADol (ULTRAM) 50 MG tablet TAKE ONE-HALF (1/2) TABLET (25MG) BY MOUTH THREE TIMES DAILY AS NEEDED     No current facility-administered medications for this visit.       REVIEW OF SYSTEMS:   Review of Systems  No fevers or chills. No headache, lightheadedness or dizziness. No SOB, chest pains or palpitations. Appetite varies. No nausea, vomiting, constipation or diarrhea. No dysuria, frequency, burning or pain with urination. Pain controlled with tramadol and tylenol. Otherwise review of systems are negative.     PHYSICAL  EXAMINATION:  Physical Exam     Vital signs: /80   Pulse 89   Temp 97.8  F (36.6  C)   Resp 16   Ht 1.524 m (5')   Wt 42.2 kg (93 lb)   SpO2 95%   BMI 18.16 kg/m    General: Awake, Alert, oriented x3, appropriately, follows simple commands  HEENT: Pink conjunctiva, anicteric sclerae, moist oral mucosa  NECK: Supple  CVS:  S1  S2, without murmur or gallop.   LUNG: Clear to auscultation, No wheezes, rales or rhonci.  BACK: No kyphosis of the thoracic spine  ABDOMEN: Soft, thin, nontender to palpation, with positive bowel sounds  EXTREMITIES: Moves both upper and lower extremities with generalized weakness.  2+ pedal edema, no calf tenderness  SKIN: Bruising to bilateral lower extremities with large scabbed area on the left lower extremity.  NEUROLOGIC: Intact, pulses palpable  PSYCHIATRIC: Cognitive impairment noted.      Labs:  All labs reviewed in the nursing home record and Epic   @  Lab Results   Component Value Date    WBC 9.5 05/16/2022     Lab Results   Component Value Date    RBC 4.08 05/16/2022     Lab Results   Component Value Date    HGB 13.3 05/16/2022     Lab Results   Component Value Date    HCT 37.9 05/16/2022     Lab Results   Component Value Date    MCV 93 05/16/2022     Lab Results   Component Value Date    MCH 32.6 05/16/2022     Lab Results   Component Value Date    MCHC 35.1 05/16/2022     Lab Results   Component Value Date    RDW 13.4 05/16/2022     Lab Results   Component Value Date     05/16/2022        @Last Comprehensive Metabolic Panel:  Sodium   Date Value Ref Range Status   05/19/2022 132 (L) 136 - 145 mmol/L Final     Potassium   Date Value Ref Range Status   05/19/2022 3.7 3.5 - 5.0 mmol/L Final     Chloride   Date Value Ref Range Status   05/19/2022 95 (L) 98 - 107 mmol/L Final     Carbon Dioxide (CO2)   Date Value Ref Range Status   05/19/2022 26 22 - 31 mmol/L Final     Anion Gap   Date Value Ref Range Status   05/19/2022 11 5 - 18 mmol/L Final     Glucose   Date  Value Ref Range Status   05/19/2022 89 70 - 125 mg/dL Final     Urea Nitrogen   Date Value Ref Range Status   05/19/2022 31 (H) 8 - 28 mg/dL Final     Creatinine   Date Value Ref Range Status   05/19/2022 0.78 0.60 - 1.10 mg/dL Final     GFR Estimate   Date Value Ref Range Status   05/19/2022 74 >60 mL/min/1.73m2 Final     Comment:     Effective December 21, 2021 eGFRcr in adults is calculated using the 2021 CKD-EPI creatinine equation which includes age and gender (Neeru et al., NEJ, DOI: 10.1056/ZSEHht2711438)   02/23/2018 >60 >60 mL/min/1.73m2 Final     Calcium   Date Value Ref Range Status   05/19/2022 9.5 8.5 - 10.5 mg/dL Final     Assessment/plan:      ICD-10-CM    1. Closed fracture of multiple pubic rami, left, sequela  S32.592S  Scheduled Tylenol 650 mg p.o. every 6 hours.  Continue tramadol 25 mg 3 times daily as needed.  Follow up with ortho today. No new orders.   Follow-up CBC unremarkable.  Edema to BLE. Tubi  on in am/off HS.   Encourage elevation.    2. Protein-calorie malnutrition, unspecified severity (H)  E46  RD consult.   3. Bronchitis  J40  patient completed a 7-day course of Augmentin.  She continues with a very coarse wet nonproductive cough.  CT of the chest negative during hospitalization.  Duo nebs 3 times daily x5 days completed.  Encourage cough and deep breathing.  Incentive spirometer every 4 hours while awake.  Chest x-ray obtained which showed atelectasis.   4. Hypertension, unspecified type  I10  satisfactory.  Hydrochlorothiazide discontinued during hospitalization.   5. Hyponatremia  E87.1  132.  Hydrochlorothiazide discontinued.     6. 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     45 minutes spent of which greater than 50% was face to face communication with the patient and her family about pain management, nebulizer treatment as well as antihypertensives and low sodium.      This note has been dictated using voice recognition software. Any  grammatical or context distortions are unintentional and inherent to the software    Electronically signed by: Daisy Watts CNP

## 2022-05-31 VITALS
DIASTOLIC BLOOD PRESSURE: 89 MMHG | BODY MASS INDEX: 18.65 KG/M2 | TEMPERATURE: 97.8 F | HEIGHT: 60 IN | SYSTOLIC BLOOD PRESSURE: 164 MMHG | RESPIRATION RATE: 16 BRPM | HEART RATE: 89 BPM | OXYGEN SATURATION: 93 % | WEIGHT: 95 LBS

## 2022-06-01 ENCOUNTER — TRANSITIONAL CARE UNIT VISIT (OUTPATIENT)
Dept: GERIATRICS | Facility: CLINIC | Age: 87
End: 2022-06-01
Payer: COMMERCIAL

## 2022-06-01 DIAGNOSIS — S32.592S CLOSED FRACTURE OF MULTIPLE PUBIC RAMI, LEFT, SEQUELA: Primary | ICD-10-CM

## 2022-06-01 DIAGNOSIS — I10 HYPERTENSION, UNSPECIFIED TYPE: ICD-10-CM

## 2022-06-01 DIAGNOSIS — E87.1 HYPONATREMIA: ICD-10-CM

## 2022-06-01 DIAGNOSIS — E46 PROTEIN-CALORIE MALNUTRITION, UNSPECIFIED SEVERITY (H): ICD-10-CM

## 2022-06-01 DIAGNOSIS — J40 BRONCHITIS: ICD-10-CM

## 2022-06-01 PROCEDURE — 99309 SBSQ NF CARE MODERATE MDM 30: CPT | Performed by: NURSE PRACTITIONER

## 2022-06-01 NOTE — LETTER
5/31/2022        RE: Crystal Ibarra  [address]  [facility] [address]        [facility]    Code Status:  FULL CODE   Visit Type:   Chief Complaint   Patient presents with     Nursing Home Acute     TCU Follow up     Facility:  [facility] [place] ([facility]) [[id number]]           Transitional Care Course: Crystal Ibarra is a 86 year old female who I am seeing today for for follow-up on the TCU.  Patient recently hospitalized on 5/11/2022 status post fall with fracture of the superior pubic Ramus on the left.  Past medical history includes multiple falls, SAH, hypertension, hyponatremia, recent pneumonia/bronchitis and frequent PVCs/bigeminy.  Patient had a mechanical fall on 5/11 in which she tripped and fell backwards.  She presented with significant left hip pain and underwent CT which showed a lateral compression type I left pelvic ring fracture with a small left pelvic sidewall hematoma.  Orthopedics was consulted and treated patient conservatively.  Patient weightbearing as tolerated.  Essential hypertension.  She continues on lisinopril and amlodipine.  Her hydrochlorothiazide was discontinued due to hyponatremia.  Sodium 130.  Acute hypoxic respiratory failure.  Patient had been seen by her PCP on 5/9/2022 with 3 weeks of cough and was treated for possible bronchitis.  She completed her Augmentin of 7 days.  D-dimer was slightly elevated.  She underwent CT of the chest which was negative for PE.  She was initially treated with oxygen but weaned off.  Frequent PVCs/bigeminy.  Patient asymptomatic.  Echo reviewed which showed some LA dilatation otherwise normal LV and RV function.  She did have some tricuspid regurgitation.  Protein malnutrition.  Patient very thin.  It was suggested for primary care provider that she start on boost.    On today's visit patient sitting up in wheelchair. Her son n law and daughter are present on exam. They are upset her tubi  is not on and  pt feet more swollen today. Her compression was taken off to be washed. Pt s/p pelvic fracture.Pt continues on tramadol and tylenol for pain.  Protein deficiency due to poor oral intake. Weight stable. She contiues on NDS. Today she tells me she prefers chocolate flavor.  Her hydrochlorothiazide was discontinued during hospitalization due to hyponatremia. Sodium now 133.      Active Ambulatory Problems     Diagnosis Date Noted     Subarachnoid bleed (H) 02/21/2018     SAH (subarachnoid hemorrhage) (H) 02/21/2018     Hypertensive emergency 02/21/2018     Closed fracture of greater trochanter of left femur (H) 02/21/2018     Scalp laceration 03/06/2018     Fracture of superior pubic ramus, left, closed, initial encounter (H) 05/11/2022     Closed fracture of left inferior pubic ramus, initial encounter (H) 05/11/2022     Scoliosis 05/18/2022     Protein-calorie malnutrition, unspecified severity (H) 05/09/2022     Osteoporosis 05/18/2022     OA (osteoarthritis) 05/18/2022     HTN (hypertension) 05/18/2022     Resolved Ambulatory Problems     Diagnosis Date Noted     No Resolved Ambulatory Problems     No Additional Past Medical History       Current Outpatient Medications:      acetaminophen (TYLENOL) 325 MG tablet, Take 650 mg by mouth every 6 hours, Disp: , Rfl:      amLODIPine (NORVASC) 5 MG tablet, [AMLODIPINE (NORVASC) 5 MG TABLET] Take 1 tablet (5 mg total) by mouth daily., Disp: 30 tablet, Rfl: 1     b complex vitamins tablet, [B COMPLEX VITAMINS TABLET] Take 1 tablet by mouth daily., Disp: , Rfl:      ipratropium - albuterol 0.5 mg/2.5 mg/3 mL (DUONEB) 0.5-2.5 (3) MG/3ML neb solution, Take 1 vial by nebulization 3 times daily, Disp: , Rfl:      lisinopril (PRINIVIL,ZESTRIL) 20 MG tablet, [LISINOPRIL (PRINIVIL,ZESTRIL) 20 MG TABLET] Take 20 mg by mouth daily., Disp: , Rfl:      MULTIVITAMIN WITH MINERALS (HAIR,SKIN AND NAILS ORAL), [MULTIVITAMIN WITH MINERALS (HAIR,SKIN AND NAILS ORAL)] Take 1 tablet by mouth  daily., Disp: , Rfl:      OMEGA-3/DHA/EPA/FISH OIL (FISH OIL-OMEGA-3 FATTY ACIDS) 300-1,000 mg capsule, [OMEGA-3/DHA/EPA/FISH OIL (FISH OIL-OMEGA-3 FATTY ACIDS) 300-1,000 MG CAPSULE] Take 1 g by mouth daily., Disp: , Rfl:      traMADol (ULTRAM) 50 MG tablet, TAKE ONE-HALF (1/2) TABLET (25MG) BY MOUTH THREE TIMES DAILY AS NEEDED, Disp: 45 tablet, Rfl: 0  Allergies   Allergen Reactions     Atenolol      Other reaction(s): Sedation  Intol. Fatigue  At 50 mg or more  25 mg OK       All Meds and Allergies reviewed in the record at the facility and is the most up-to-date.    Current Outpatient Medications   Medication Sig     acetaminophen (TYLENOL) 325 MG tablet Take 650 mg by mouth every 6 hours     amLODIPine (NORVASC) 5 MG tablet [AMLODIPINE (NORVASC) 5 MG TABLET] Take 1 tablet (5 mg total) by mouth daily.     b complex vitamins tablet [B COMPLEX VITAMINS TABLET] Take 1 tablet by mouth daily.     ipratropium - albuterol 0.5 mg/2.5 mg/3 mL (DUONEB) 0.5-2.5 (3) MG/3ML neb solution Take 1 vial by nebulization 3 times daily     lisinopril (PRINIVIL,ZESTRIL) 20 MG tablet [LISINOPRIL (PRINIVIL,ZESTRIL) 20 MG TABLET] Take 20 mg by mouth daily.     MULTIVITAMIN WITH MINERALS (HAIR,SKIN AND NAILS ORAL) [MULTIVITAMIN WITH MINERALS (HAIR,SKIN AND NAILS ORAL)] Take 1 tablet by mouth daily.     OMEGA-3/DHA/EPA/FISH OIL (FISH OIL-OMEGA-3 FATTY ACIDS) 300-1,000 mg capsule [OMEGA-3/DHA/EPA/FISH OIL (FISH OIL-OMEGA-3 FATTY ACIDS) 300-1,000 MG CAPSULE] Take 1 g by mouth daily.     traMADol (ULTRAM) 50 MG tablet TAKE ONE-HALF (1/2) TABLET (25MG) BY MOUTH THREE TIMES DAILY AS NEEDED     No current facility-administered medications for this visit.       REVIEW OF SYSTEMS:   Review of Systems  No fevers or chills. No headache, lightheadedness or dizziness. No SOB, chest pains or palpitations. Appetite varies. No nausea, vomiting, constipation or diarrhea. No dysuria, frequency, burning or pain with urination. Pain controlled with tramadol  and tylenol. Otherwise review of systems are negative.     PHYSICAL EXAMINATION:  Physical Exam     Vital signs: BP (!) 164/89   Pulse 89   Temp 97.8  F (36.6  C)   Resp 16   Ht 1.524 m (5')   Wt 43.1 kg (95 lb)   SpO2 93%   BMI 18.55 kg/m    General: Awake, Alert, oriented x3, appropriately, follows simple commands  HEENT: Pink conjunctiva, anicteric sclerae, moist oral mucosa  NECK: Supple  CVS:  S1  S2, without murmur or gallop.   LUNG: Clear to auscultation, No wheezes, rales or rhonci.  BACK: No kyphosis of the thoracic spine  ABDOMEN: Soft, thin, nontender to palpation, with positive bowel sounds  EXTREMITIES: Moves both upper and lower extremities with generalized weakness.  2+ pedal edema, no calf tenderness  SKIN: Bruising to bilateral lower extremities with large scabbed area on the left lower extremity.  NEUROLOGIC: Intact, pulses palpable  PSYCHIATRIC: Cognitive impairment noted.      Labs:  All labs reviewed in the nursing home record and Epic   @  Lab Results   Component Value Date    WBC 9.5 05/16/2022     Lab Results   Component Value Date    RBC 4.08 05/16/2022     Lab Results   Component Value Date    HGB 13.3 05/16/2022     Lab Results   Component Value Date    HCT 37.9 05/16/2022     Lab Results   Component Value Date    MCV 93 05/16/2022     Lab Results   Component Value Date    MCH 32.6 05/16/2022     Lab Results   Component Value Date    MCHC 35.1 05/16/2022     Lab Results   Component Value Date    RDW 13.4 05/16/2022     Lab Results   Component Value Date     05/16/2022        @Last Comprehensive Metabolic Panel:  Sodium   Date Value Ref Range Status   05/19/2022 132 (L) 136 - 145 mmol/L Final     Potassium   Date Value Ref Range Status   05/19/2022 3.7 3.5 - 5.0 mmol/L Final     Chloride   Date Value Ref Range Status   05/19/2022 95 (L) 98 - 107 mmol/L Final     Carbon Dioxide (CO2)   Date Value Ref Range Status   05/19/2022 26 22 - 31 mmol/L Final     Anion Gap   Date Value  Ref Range Status   05/19/2022 11 5 - 18 mmol/L Final     Glucose   Date Value Ref Range Status   05/19/2022 89 70 - 125 mg/dL Final     Urea Nitrogen   Date Value Ref Range Status   05/19/2022 31 (H) 8 - 28 mg/dL Final     Creatinine   Date Value Ref Range Status   05/19/2022 0.78 0.60 - 1.10 mg/dL Final     GFR Estimate   Date Value Ref Range Status   05/19/2022 74 >60 mL/min/1.73m2 Final     Comment:     Effective December 21, 2021 eGFRcr in adults is calculated using the 2021 CKD-EPI creatinine equation which includes age and gender (Neeru et al., NEJ, DOI: 10.1056/CLJHum3955872)   02/23/2018 >60 >60 mL/min/1.73m2 Final     Calcium   Date Value Ref Range Status   05/19/2022 9.5 8.5 - 10.5 mg/dL Final     Assessment/plan:      ICD-10-CM    1. Closed fracture of multiple pubic rami, left, sequela  S32.592S  Scheduled Tylenol 650 mg p.o. every 6 hours.  Continue tramadol 25 mg 3 times daily as needed.   Follow-up CBC unremarkable.  Edema to BLE. Tubi  on in am/off HS.   Encourage elevation.    2. Protein-calorie malnutrition, unspecified severity (H)  E46  RD consult. Edema most likely from protein malnutrition  and dependent positioning.    3. Bronchitis  J40  patient completed a 7-day course of Augmentin.  She continues with a very coarse wet nonproductive cough.  CT of the chest negative during hospitalization.  Duo nebs 3 times daily x5 days completed.  Encourage cough and deep breathing.  Incentive spirometer every 4 hours while awake.  Chest x-ray obtained which showed atelectasis.   4. Hypertension, unspecified type  I10  satisfactory.  Hydrochlorothiazide discontinued during hospitalization.   5. Hyponatremia  E87.1  132.  Hydrochlorothiazide discontinued.     6. 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       This note has been dictated using voice recognition software. Any grammatical or context distortions are unintentional and inherent to the  software    Electronically signed by: Daisy Watts CNP           Sincerely,        Daisy Watts, NP

## 2022-06-02 NOTE — PROGRESS NOTES
[facility]    Code Status:  FULL CODE   Visit Type:   Chief Complaint   Patient presents with     Nursing Home Acute     TCU Follow up     Facility:  [facility] [place] ([facility]) [[id number]]           Transitional Care Course: Crystal Ibarra is a 86 year old female who I am seeing today for for follow-up on the TCU.  Patient recently hospitalized on 5/11/2022 status post fall with fracture of the superior pubic Ramus on the left.  Past medical history includes multiple falls, SAH, hypertension, hyponatremia, recent pneumonia/bronchitis and frequent PVCs/bigeminy.  Patient had a mechanical fall on 5/11 in which she tripped and fell backwards.  She presented with significant left hip pain and underwent CT which showed a lateral compression type I left pelvic ring fracture with a small left pelvic sidewall hematoma.  Orthopedics was consulted and treated patient conservatively.  Patient weightbearing as tolerated.  Essential hypertension.  She continues on lisinopril and amlodipine.  Her hydrochlorothiazide was discontinued due to hyponatremia.  Sodium 130.  Acute hypoxic respiratory failure.  Patient had been seen by her PCP on 5/9/2022 with 3 weeks of cough and was treated for possible bronchitis.  She completed her Augmentin of 7 days.  D-dimer was slightly elevated.  She underwent CT of the chest which was negative for PE.  She was initially treated with oxygen but weaned off.  Frequent PVCs/bigeminy.  Patient asymptomatic.  Echo reviewed which showed some LA dilatation otherwise normal LV and RV function.  She did have some tricuspid regurgitation.  Protein malnutrition.  Patient very thin.  It was suggested for primary care provider that she start on boost.    On today's visit patient sitting up in wheelchair. Her son n law and daughter are present on exam. They are upset her tubi  is not on and pt feet more swollen today. Her compression was taken off to be washed. Pt s/p pelvic fracture.Pt  continues on tramadol and tylenol for pain.  Protein deficiency due to poor oral intake. Weight stable. She contiues on NDS. Today she tells me she prefers chocolate flavor.  Her hydrochlorothiazide was discontinued during hospitalization due to hyponatremia. Sodium now 133.      Active Ambulatory Problems     Diagnosis Date Noted     Subarachnoid bleed (H) 02/21/2018     SAH (subarachnoid hemorrhage) (H) 02/21/2018     Hypertensive emergency 02/21/2018     Closed fracture of greater trochanter of left femur (H) 02/21/2018     Scalp laceration 03/06/2018     Fracture of superior pubic ramus, left, closed, initial encounter (H) 05/11/2022     Closed fracture of left inferior pubic ramus, initial encounter (H) 05/11/2022     Scoliosis 05/18/2022     Protein-calorie malnutrition, unspecified severity (H) 05/09/2022     Osteoporosis 05/18/2022     OA (osteoarthritis) 05/18/2022     HTN (hypertension) 05/18/2022     Resolved Ambulatory Problems     Diagnosis Date Noted     No Resolved Ambulatory Problems     No Additional Past Medical History       Current Outpatient Medications:      acetaminophen (TYLENOL) 325 MG tablet, Take 650 mg by mouth every 6 hours, Disp: , Rfl:      amLODIPine (NORVASC) 5 MG tablet, [AMLODIPINE (NORVASC) 5 MG TABLET] Take 1 tablet (5 mg total) by mouth daily., Disp: 30 tablet, Rfl: 1     b complex vitamins tablet, [B COMPLEX VITAMINS TABLET] Take 1 tablet by mouth daily., Disp: , Rfl:      ipratropium - albuterol 0.5 mg/2.5 mg/3 mL (DUONEB) 0.5-2.5 (3) MG/3ML neb solution, Take 1 vial by nebulization 3 times daily, Disp: , Rfl:      lisinopril (PRINIVIL,ZESTRIL) 20 MG tablet, [LISINOPRIL (PRINIVIL,ZESTRIL) 20 MG TABLET] Take 20 mg by mouth daily., Disp: , Rfl:      MULTIVITAMIN WITH MINERALS (HAIR,SKIN AND NAILS ORAL), [MULTIVITAMIN WITH MINERALS (HAIR,SKIN AND NAILS ORAL)] Take 1 tablet by mouth daily., Disp: , Rfl:      OMEGA-3/DHA/EPA/FISH OIL (FISH OIL-OMEGA-3 FATTY ACIDS) 300-1,000 mg  capsule, [OMEGA-3/DHA/EPA/FISH OIL (FISH OIL-OMEGA-3 FATTY ACIDS) 300-1,000 MG CAPSULE] Take 1 g by mouth daily., Disp: , Rfl:      traMADol (ULTRAM) 50 MG tablet, TAKE ONE-HALF (1/2) TABLET (25MG) BY MOUTH THREE TIMES DAILY AS NEEDED, Disp: 45 tablet, Rfl: 0  Allergies   Allergen Reactions     Atenolol      Other reaction(s): Sedation  Intol. Fatigue  At 50 mg or more  25 mg OK       All Meds and Allergies reviewed in the record at the facility and is the most up-to-date.    Current Outpatient Medications   Medication Sig     acetaminophen (TYLENOL) 325 MG tablet Take 650 mg by mouth every 6 hours     amLODIPine (NORVASC) 5 MG tablet [AMLODIPINE (NORVASC) 5 MG TABLET] Take 1 tablet (5 mg total) by mouth daily.     b complex vitamins tablet [B COMPLEX VITAMINS TABLET] Take 1 tablet by mouth daily.     ipratropium - albuterol 0.5 mg/2.5 mg/3 mL (DUONEB) 0.5-2.5 (3) MG/3ML neb solution Take 1 vial by nebulization 3 times daily     lisinopril (PRINIVIL,ZESTRIL) 20 MG tablet [LISINOPRIL (PRINIVIL,ZESTRIL) 20 MG TABLET] Take 20 mg by mouth daily.     MULTIVITAMIN WITH MINERALS (HAIR,SKIN AND NAILS ORAL) [MULTIVITAMIN WITH MINERALS (HAIR,SKIN AND NAILS ORAL)] Take 1 tablet by mouth daily.     OMEGA-3/DHA/EPA/FISH OIL (FISH OIL-OMEGA-3 FATTY ACIDS) 300-1,000 mg capsule [OMEGA-3/DHA/EPA/FISH OIL (FISH OIL-OMEGA-3 FATTY ACIDS) 300-1,000 MG CAPSULE] Take 1 g by mouth daily.     traMADol (ULTRAM) 50 MG tablet TAKE ONE-HALF (1/2) TABLET (25MG) BY MOUTH THREE TIMES DAILY AS NEEDED     No current facility-administered medications for this visit.       REVIEW OF SYSTEMS:   Review of Systems  No fevers or chills. No headache, lightheadedness or dizziness. No SOB, chest pains or palpitations. Appetite varies. No nausea, vomiting, constipation or diarrhea. No dysuria, frequency, burning or pain with urination. Pain controlled with tramadol and tylenol. Otherwise review of systems are negative.     PHYSICAL EXAMINATION:  Physical Exam      Vital signs: BP (!) 164/89   Pulse 89   Temp 97.8  F (36.6  C)   Resp 16   Ht 1.524 m (5')   Wt 43.1 kg (95 lb)   SpO2 93%   BMI 18.55 kg/m    General: Awake, Alert, oriented x3, appropriately, follows simple commands  HEENT: Pink conjunctiva, anicteric sclerae, moist oral mucosa  NECK: Supple  CVS:  S1  S2, without murmur or gallop.   LUNG: Clear to auscultation, No wheezes, rales or rhonci.  BACK: No kyphosis of the thoracic spine  ABDOMEN: Soft, thin, nontender to palpation, with positive bowel sounds  EXTREMITIES: Moves both upper and lower extremities with generalized weakness.  2+ pedal edema, no calf tenderness  SKIN: Bruising to bilateral lower extremities with large scabbed area on the left lower extremity.  NEUROLOGIC: Intact, pulses palpable  PSYCHIATRIC: Cognitive impairment noted.      Labs:  All labs reviewed in the nursing home record and Epic   @  Lab Results   Component Value Date    WBC 9.5 05/16/2022     Lab Results   Component Value Date    RBC 4.08 05/16/2022     Lab Results   Component Value Date    HGB 13.3 05/16/2022     Lab Results   Component Value Date    HCT 37.9 05/16/2022     Lab Results   Component Value Date    MCV 93 05/16/2022     Lab Results   Component Value Date    MCH 32.6 05/16/2022     Lab Results   Component Value Date    MCHC 35.1 05/16/2022     Lab Results   Component Value Date    RDW 13.4 05/16/2022     Lab Results   Component Value Date     05/16/2022        @Last Comprehensive Metabolic Panel:  Sodium   Date Value Ref Range Status   05/19/2022 132 (L) 136 - 145 mmol/L Final     Potassium   Date Value Ref Range Status   05/19/2022 3.7 3.5 - 5.0 mmol/L Final     Chloride   Date Value Ref Range Status   05/19/2022 95 (L) 98 - 107 mmol/L Final     Carbon Dioxide (CO2)   Date Value Ref Range Status   05/19/2022 26 22 - 31 mmol/L Final     Anion Gap   Date Value Ref Range Status   05/19/2022 11 5 - 18 mmol/L Final     Glucose   Date Value Ref Range Status    05/19/2022 89 70 - 125 mg/dL Final     Urea Nitrogen   Date Value Ref Range Status   05/19/2022 31 (H) 8 - 28 mg/dL Final     Creatinine   Date Value Ref Range Status   05/19/2022 0.78 0.60 - 1.10 mg/dL Final     GFR Estimate   Date Value Ref Range Status   05/19/2022 74 >60 mL/min/1.73m2 Final     Comment:     Effective December 21, 2021 eGFRcr in adults is calculated using the 2021 CKD-EPI creatinine equation which includes age and gender (Neeru et al., NEJ, DOI: 10.1056/JZULwo2278023)   02/23/2018 >60 >60 mL/min/1.73m2 Final     Calcium   Date Value Ref Range Status   05/19/2022 9.5 8.5 - 10.5 mg/dL Final     Assessment/plan:      ICD-10-CM    1. Closed fracture of multiple pubic rami, left, sequela  S32.592S  Scheduled Tylenol 650 mg p.o. every 6 hours.  Continue tramadol 25 mg 3 times daily as needed.   Follow-up CBC unremarkable.  Edema to BLE. Tubi  on in am/off HS.   Encourage elevation.    2. Protein-calorie malnutrition, unspecified severity (H)  E46  RD consult. Edema most likely from protein malnutrition  and dependent positioning.    3. Bronchitis  J40  patient completed a 7-day course of Augmentin.  She continues with a very coarse wet nonproductive cough.  CT of the chest negative during hospitalization.  Duo nebs 3 times daily x5 days completed.  Encourage cough and deep breathing.  Incentive spirometer every 4 hours while awake.  Chest x-ray obtained which showed atelectasis.   4. Hypertension, unspecified type  I10  satisfactory.  Hydrochlorothiazide discontinued during hospitalization.   5. Hyponatremia  E87.1  132.  Hydrochlorothiazide discontinued.     6. 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       This note has been dictated using voice recognition software. Any grammatical or context distortions are unintentional and inherent to the software    Electronically signed by: Daisy Watts, CNP

## 2022-06-07 ENCOUNTER — TRANSITIONAL CARE UNIT VISIT (OUTPATIENT)
Dept: GERIATRICS | Facility: CLINIC | Age: 87
End: 2022-06-07
Payer: COMMERCIAL

## 2022-06-07 VITALS
TEMPERATURE: 97.7 F | OXYGEN SATURATION: 95 % | HEIGHT: 60 IN | HEART RATE: 59 BPM | SYSTOLIC BLOOD PRESSURE: 121 MMHG | DIASTOLIC BLOOD PRESSURE: 73 MMHG | RESPIRATION RATE: 20 BRPM | BODY MASS INDEX: 18.49 KG/M2 | WEIGHT: 94.2 LBS

## 2022-06-07 DIAGNOSIS — S32.592S CLOSED FRACTURE OF MULTIPLE PUBIC RAMI, LEFT, SEQUELA: Primary | ICD-10-CM

## 2022-06-07 DIAGNOSIS — J40 BRONCHITIS: ICD-10-CM

## 2022-06-07 DIAGNOSIS — E87.1 HYPONATREMIA: ICD-10-CM

## 2022-06-07 DIAGNOSIS — I10 HYPERTENSION, UNSPECIFIED TYPE: ICD-10-CM

## 2022-06-07 DIAGNOSIS — E46 PROTEIN-CALORIE MALNUTRITION, UNSPECIFIED SEVERITY (H): ICD-10-CM

## 2022-06-07 PROCEDURE — 99309 SBSQ NF CARE MODERATE MDM 30: CPT | Performed by: NURSE PRACTITIONER

## 2022-06-07 NOTE — LETTER
6/7/2022        RE: Crystal Ibarra  [address]  [place] [address]        [facility]    Code Status:  FULL CODE   Visit Type:   Chief Complaint   Patient presents with     Nursing Home Acute     TCU Follow up     Facility:  [facility] ([facility]) [[id number]]           Transitional Care Course: Crystal Ibarra is a 86 year old female who I am seeing today for for follow-up on the TCU.  Patient recently hospitalized on 5/11/2022 status post fall with fracture of the superior pubic Ramus on the left.  Past medical history includes multiple falls, SAH, hypertension, hyponatremia, recent pneumonia/bronchitis and frequent PVCs/bigeminy.  Patient had a mechanical fall on 5/11 in which she tripped and fell backwards.  She presented with significant left hip pain and underwent CT which showed a lateral compression type I left pelvic ring fracture with a small left pelvic sidewall hematoma.  Orthopedics was consulted and treated patient conservatively.  Patient weightbearing as tolerated.  Essential hypertension.  She continues on lisinopril and amlodipine.  Her hydrochlorothiazide was discontinued due to hyponatremia.  Sodium 130.  Acute hypoxic respiratory failure.  Patient had been seen by her PCP on 5/9/2022 with 3 weeks of cough and was treated for possible bronchitis.  She completed her Augmentin of 7 days.  D-dimer was slightly elevated.  She underwent CT of the chest which was negative for PE.  She was initially treated with oxygen but weaned off.  Frequent PVCs/bigeminy.  Patient asymptomatic.  Echo reviewed which showed some LA dilatation otherwise normal LV and RV function.  She did have some tricuspid regurgitation.  Protein malnutrition.  Patient very thin.  It was suggested for primary care provider that she start on boost.    On today's visit patient sitting up in wheelchair. Her son n law and daughter are present on exam.  Patient with recent left pelvic fracture.   Patient reports pain greatly improved.  She continues on Tylenol.  Appetite improving.  Previously protein deficiency.  Continues on supplement.  Lower extremity edema improved on today's visit.  She continues in Tubigrip.  Chronic hyponatremia.  Hydrochlorothiazide discontinued during hospitalization.  Sodium 132.  Blood pressure satisfactory.    Active Ambulatory Problems     Diagnosis Date Noted     Subarachnoid bleed (H) 02/21/2018     SAH (subarachnoid hemorrhage) (H) 02/21/2018     Hypertensive emergency 02/21/2018     Closed fracture of greater trochanter of left femur (H) 02/21/2018     Scalp laceration 03/06/2018     Fracture of superior pubic ramus, left, closed, initial encounter (H) 05/11/2022     Closed fracture of left inferior pubic ramus, initial encounter (H) 05/11/2022     Scoliosis 05/18/2022     Protein-calorie malnutrition, unspecified severity (H) 05/09/2022     Osteoporosis 05/18/2022     OA (osteoarthritis) 05/18/2022     HTN (hypertension) 05/18/2022     Resolved Ambulatory Problems     Diagnosis Date Noted     No Resolved Ambulatory Problems     No Additional Past Medical History       Current Outpatient Medications:      acetaminophen (TYLENOL) 325 MG tablet, Take 650 mg by mouth every 6 hours, Disp: , Rfl:      amLODIPine (NORVASC) 5 MG tablet, [AMLODIPINE (NORVASC) 5 MG TABLET] Take 1 tablet (5 mg total) by mouth daily., Disp: 30 tablet, Rfl: 1     b complex vitamins tablet, [B COMPLEX VITAMINS TABLET] Take 1 tablet by mouth daily., Disp: , Rfl:      ipratropium - albuterol 0.5 mg/2.5 mg/3 mL (DUONEB) 0.5-2.5 (3) MG/3ML neb solution, Take 1 vial by nebulization 3 times daily, Disp: , Rfl:      lisinopril (PRINIVIL,ZESTRIL) 20 MG tablet, [LISINOPRIL (PRINIVIL,ZESTRIL) 20 MG TABLET] Take 20 mg by mouth daily., Disp: , Rfl:      MULTIVITAMIN WITH MINERALS (HAIR,SKIN AND NAILS ORAL), [MULTIVITAMIN WITH MINERALS (HAIR,SKIN AND NAILS ORAL)] Take 1 tablet by mouth daily., Disp: , Rfl:       OMEGA-3/DHA/EPA/FISH OIL (FISH OIL-OMEGA-3 FATTY ACIDS) 300-1,000 mg capsule, [OMEGA-3/DHA/EPA/FISH OIL (FISH OIL-OMEGA-3 FATTY ACIDS) 300-1,000 MG CAPSULE] Take 1 g by mouth daily., Disp: , Rfl:   Allergies   Allergen Reactions     Atenolol      Other reaction(s): Sedation  Intol. Fatigue  At 50 mg or more  25 mg OK       All Meds and Allergies reviewed in the record at the facility and is the most up-to-date.    Current Outpatient Medications   Medication Sig     acetaminophen (TYLENOL) 325 MG tablet Take 650 mg by mouth every 6 hours     amLODIPine (NORVASC) 5 MG tablet [AMLODIPINE (NORVASC) 5 MG TABLET] Take 1 tablet (5 mg total) by mouth daily.     b complex vitamins tablet [B COMPLEX VITAMINS TABLET] Take 1 tablet by mouth daily.     ipratropium - albuterol 0.5 mg/2.5 mg/3 mL (DUONEB) 0.5-2.5 (3) MG/3ML neb solution Take 1 vial by nebulization 3 times daily     lisinopril (PRINIVIL,ZESTRIL) 20 MG tablet [LISINOPRIL (PRINIVIL,ZESTRIL) 20 MG TABLET] Take 20 mg by mouth daily.     MULTIVITAMIN WITH MINERALS (HAIR,SKIN AND NAILS ORAL) [MULTIVITAMIN WITH MINERALS (HAIR,SKIN AND NAILS ORAL)] Take 1 tablet by mouth daily.     OMEGA-3/DHA/EPA/FISH OIL (FISH OIL-OMEGA-3 FATTY ACIDS) 300-1,000 mg capsule [OMEGA-3/DHA/EPA/FISH OIL (FISH OIL-OMEGA-3 FATTY ACIDS) 300-1,000 MG CAPSULE] Take 1 g by mouth daily.     No current facility-administered medications for this visit.       REVIEW OF SYSTEMS:   Review of Systems  No fevers or chills. No headache, lightheadedness or dizziness. No SOB, chest pains or palpitations. Appetite varies. No nausea, vomiting, constipation or diarrhea. No dysuria, frequency, burning or pain with urination. Pain controlled with tylenol. Otherwise review of systems are negative.     PHYSICAL EXAMINATION:  Physical Exam     Vital signs: /73   Pulse 59   Temp 97.7  F (36.5  C)   Resp 20   Ht 1.524 m (5')   Wt 42.7 kg (94 lb 3.2 oz)   SpO2 95%   BMI 18.40 kg/m    General: Awake, Alert,  oriented x3, appropriately, follows simple commands  HEENT: Pink conjunctiva, anicteric sclerae, moist oral mucosa  NECK: Supple  CVS:  S1  S2, without murmur or gallop.   LUNG: Clear to auscultation, No wheezes, rales or rhonci.  BACK: No kyphosis of the thoracic spine  ABDOMEN: Soft, thin, nontender to palpation, with positive bowel sounds  EXTREMITIES: Moves both upper and lower extremities with generalized weakness.  Trace pedal edema, no calf tenderness  SKIN: Bruising to bilateral lower extremities with large scabbed area on the left lower extremity.  NEUROLOGIC: Intact, pulses palpable  PSYCHIATRIC: Cognitive impairment noted.      Labs:  All labs reviewed in the nursing home record and Epic   @  Lab Results   Component Value Date    WBC 9.5 05/16/2022     Lab Results   Component Value Date    RBC 4.08 05/16/2022     Lab Results   Component Value Date    HGB 13.3 05/16/2022     Lab Results   Component Value Date    HCT 37.9 05/16/2022     Lab Results   Component Value Date    MCV 93 05/16/2022     Lab Results   Component Value Date    MCH 32.6 05/16/2022     Lab Results   Component Value Date    MCHC 35.1 05/16/2022     Lab Results   Component Value Date    RDW 13.4 05/16/2022     Lab Results   Component Value Date     05/16/2022        @Last Comprehensive Metabolic Panel:  Sodium   Date Value Ref Range Status   05/19/2022 132 (L) 136 - 145 mmol/L Final     Potassium   Date Value Ref Range Status   05/19/2022 3.7 3.5 - 5.0 mmol/L Final     Chloride   Date Value Ref Range Status   05/19/2022 95 (L) 98 - 107 mmol/L Final     Carbon Dioxide (CO2)   Date Value Ref Range Status   05/19/2022 26 22 - 31 mmol/L Final     Anion Gap   Date Value Ref Range Status   05/19/2022 11 5 - 18 mmol/L Final     Glucose   Date Value Ref Range Status   05/19/2022 89 70 - 125 mg/dL Final     Urea Nitrogen   Date Value Ref Range Status   05/19/2022 31 (H) 8 - 28 mg/dL Final     Creatinine   Date Value Ref Range Status    05/19/2022 0.78 0.60 - 1.10 mg/dL Final     GFR Estimate   Date Value Ref Range Status   05/19/2022 74 >60 mL/min/1.73m2 Final     Comment:     Effective December 21, 2021 eGFRcr in adults is calculated using the 2021 CKD-EPI creatinine equation which includes age and gender (Neeru et al., NEJ, DOI: 10.1056/YTDDmw3377365)   02/23/2018 >60 >60 mL/min/1.73m2 Final     Calcium   Date Value Ref Range Status   05/19/2022 9.5 8.5 - 10.5 mg/dL Final     Assessment/plan:      ICD-10-CM    1. Closed fracture of multiple pubic rami, left, sequela  S32.592S  Scheduled Tylenol 650 mg p.o. every 6 hours.  DC tramadol.  Follow-up CBC unremarkable.    2. Protein-calorie malnutrition, unspecified severity (H)  E46  RD consult.  Continues on MDS.  Edema improved on today's visit.   3. Bronchitis  J40  patient completed a 7-day course of Augmentin.  She continues with a very coarse wet nonproductive cough.  CT of the chest negative during hospitalization.  Duo nebs 3 times daily x5 days completed.  Encourage cough and deep breathing.  Incentive spirometer every 4 hours while awake.  Chest x-ray obtained which showed atelectasis.   4. Hypertension, unspecified type  I10  satisfactory.  Hydrochlorothiazide discontinued during hospitalization.   5. Hyponatremia  E87.1  132.  Hydrochlorothiazide discontinued.  Repeat BMP.   6. 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       This note has been dictated using voice recognition software. Any grammatical or context distortions are unintentional and inherent to the software    Electronically signed by: Daisy Watts CNP           Sincerely,        Daisy Watts NP

## 2022-06-08 NOTE — PROGRESS NOTES
[facility]    Code Status:  FULL CODE   Visit Type:   Chief Complaint   Patient presents with     Nursing Home Acute     TCU Follow up     Facility:  [facility] ([facility]) [[id number]]           Transitional Care Course: Crystal Ibarra is a 86 year old female who I am seeing today for for follow-up on the TCU.  Patient recently hospitalized on 5/11/2022 status post fall with fracture of the superior pubic Ramus on the left.  Past medical history includes multiple falls, SAH, hypertension, hyponatremia, recent pneumonia/bronchitis and frequent PVCs/bigeminy.  Patient had a mechanical fall on 5/11 in which she tripped and fell backwards.  She presented with significant left hip pain and underwent CT which showed a lateral compression type I left pelvic ring fracture with a small left pelvic sidewall hematoma.  Orthopedics was consulted and treated patient conservatively.  Patient weightbearing as tolerated.  Essential hypertension.  She continues on lisinopril and amlodipine.  Her hydrochlorothiazide was discontinued due to hyponatremia.  Sodium 130.  Acute hypoxic respiratory failure.  Patient had been seen by her PCP on 5/9/2022 with 3 weeks of cough and was treated for possible bronchitis.  She completed her Augmentin of 7 days.  D-dimer was slightly elevated.  She underwent CT of the chest which was negative for PE.  She was initially treated with oxygen but weaned off.  Frequent PVCs/bigeminy.  Patient asymptomatic.  Echo reviewed which showed some LA dilatation otherwise normal LV and RV function.  She did have some tricuspid regurgitation.  Protein malnutrition.  Patient very thin.  It was suggested for primary care provider that she start on boost.    On today's visit patient sitting up in wheelchair. Her son n law and daughter are present on exam.  Patient with recent left pelvic fracture.  Patient reports pain greatly improved.  She continues on Tylenol.  Appetite improving.  Previously  protein deficiency.  Continues on supplement.  Lower extremity edema improved on today's visit.  She continues in Tubigrip.  Chronic hyponatremia.  Hydrochlorothiazide discontinued during hospitalization.  Sodium 132.  Blood pressure satisfactory.    Active Ambulatory Problems     Diagnosis Date Noted     Subarachnoid bleed (H) 02/21/2018     SAH (subarachnoid hemorrhage) (H) 02/21/2018     Hypertensive emergency 02/21/2018     Closed fracture of greater trochanter of left femur (H) 02/21/2018     Scalp laceration 03/06/2018     Fracture of superior pubic ramus, left, closed, initial encounter (H) 05/11/2022     Closed fracture of left inferior pubic ramus, initial encounter (H) 05/11/2022     Scoliosis 05/18/2022     Protein-calorie malnutrition, unspecified severity (H) 05/09/2022     Osteoporosis 05/18/2022     OA (osteoarthritis) 05/18/2022     HTN (hypertension) 05/18/2022     Resolved Ambulatory Problems     Diagnosis Date Noted     No Resolved Ambulatory Problems     No Additional Past Medical History       Current Outpatient Medications:      acetaminophen (TYLENOL) 325 MG tablet, Take 650 mg by mouth every 6 hours, Disp: , Rfl:      amLODIPine (NORVASC) 5 MG tablet, [AMLODIPINE (NORVASC) 5 MG TABLET] Take 1 tablet (5 mg total) by mouth daily., Disp: 30 tablet, Rfl: 1     b complex vitamins tablet, [B COMPLEX VITAMINS TABLET] Take 1 tablet by mouth daily., Disp: , Rfl:      ipratropium - albuterol 0.5 mg/2.5 mg/3 mL (DUONEB) 0.5-2.5 (3) MG/3ML neb solution, Take 1 vial by nebulization 3 times daily, Disp: , Rfl:      lisinopril (PRINIVIL,ZESTRIL) 20 MG tablet, [LISINOPRIL (PRINIVIL,ZESTRIL) 20 MG TABLET] Take 20 mg by mouth daily., Disp: , Rfl:      MULTIVITAMIN WITH MINERALS (HAIR,SKIN AND NAILS ORAL), [MULTIVITAMIN WITH MINERALS (HAIR,SKIN AND NAILS ORAL)] Take 1 tablet by mouth daily., Disp: , Rfl:      OMEGA-3/DHA/EPA/FISH OIL (FISH OIL-OMEGA-3 FATTY ACIDS) 300-1,000 mg capsule, [OMEGA-3/DHA/EPA/FISH OIL  (FISH OIL-OMEGA-3 FATTY ACIDS) 300-1,000 MG CAPSULE] Take 1 g by mouth daily., Disp: , Rfl:   Allergies   Allergen Reactions     Atenolol      Other reaction(s): Sedation  Intol. Fatigue  At 50 mg or more  25 mg OK       All Meds and Allergies reviewed in the record at the facility and is the most up-to-date.    Current Outpatient Medications   Medication Sig     acetaminophen (TYLENOL) 325 MG tablet Take 650 mg by mouth every 6 hours     amLODIPine (NORVASC) 5 MG tablet [AMLODIPINE (NORVASC) 5 MG TABLET] Take 1 tablet (5 mg total) by mouth daily.     b complex vitamins tablet [B COMPLEX VITAMINS TABLET] Take 1 tablet by mouth daily.     ipratropium - albuterol 0.5 mg/2.5 mg/3 mL (DUONEB) 0.5-2.5 (3) MG/3ML neb solution Take 1 vial by nebulization 3 times daily     lisinopril (PRINIVIL,ZESTRIL) 20 MG tablet [LISINOPRIL (PRINIVIL,ZESTRIL) 20 MG TABLET] Take 20 mg by mouth daily.     MULTIVITAMIN WITH MINERALS (HAIR,SKIN AND NAILS ORAL) [MULTIVITAMIN WITH MINERALS (HAIR,SKIN AND NAILS ORAL)] Take 1 tablet by mouth daily.     OMEGA-3/DHA/EPA/FISH OIL (FISH OIL-OMEGA-3 FATTY ACIDS) 300-1,000 mg capsule [OMEGA-3/DHA/EPA/FISH OIL (FISH OIL-OMEGA-3 FATTY ACIDS) 300-1,000 MG CAPSULE] Take 1 g by mouth daily.     No current facility-administered medications for this visit.       REVIEW OF SYSTEMS:   Review of Systems  No fevers or chills. No headache, lightheadedness or dizziness. No SOB, chest pains or palpitations. Appetite varies. No nausea, vomiting, constipation or diarrhea. No dysuria, frequency, burning or pain with urination. Pain controlled with tylenol. Otherwise review of systems are negative.     PHYSICAL EXAMINATION:  Physical Exam     Vital signs: /73   Pulse 59   Temp 97.7  F (36.5  C)   Resp 20   Ht 1.524 m (5')   Wt 42.7 kg (94 lb 3.2 oz)   SpO2 95%   BMI 18.40 kg/m    General: Awake, Alert, oriented x3, appropriately, follows simple commands  HEENT: Pink conjunctiva, anicteric sclerae, moist  oral mucosa  NECK: Supple  CVS:  S1  S2, without murmur or gallop.   LUNG: Clear to auscultation, No wheezes, rales or rhonci.  BACK: No kyphosis of the thoracic spine  ABDOMEN: Soft, thin, nontender to palpation, with positive bowel sounds  EXTREMITIES: Moves both upper and lower extremities with generalized weakness.  Trace pedal edema, no calf tenderness  SKIN: Bruising to bilateral lower extremities with large scabbed area on the left lower extremity.  NEUROLOGIC: Intact, pulses palpable  PSYCHIATRIC: Cognitive impairment noted.      Labs:  All labs reviewed in the nursing home record and Epic   @  Lab Results   Component Value Date    WBC 9.5 05/16/2022     Lab Results   Component Value Date    RBC 4.08 05/16/2022     Lab Results   Component Value Date    HGB 13.3 05/16/2022     Lab Results   Component Value Date    HCT 37.9 05/16/2022     Lab Results   Component Value Date    MCV 93 05/16/2022     Lab Results   Component Value Date    MCH 32.6 05/16/2022     Lab Results   Component Value Date    MCHC 35.1 05/16/2022     Lab Results   Component Value Date    RDW 13.4 05/16/2022     Lab Results   Component Value Date     05/16/2022        @Last Comprehensive Metabolic Panel:  Sodium   Date Value Ref Range Status   05/19/2022 132 (L) 136 - 145 mmol/L Final     Potassium   Date Value Ref Range Status   05/19/2022 3.7 3.5 - 5.0 mmol/L Final     Chloride   Date Value Ref Range Status   05/19/2022 95 (L) 98 - 107 mmol/L Final     Carbon Dioxide (CO2)   Date Value Ref Range Status   05/19/2022 26 22 - 31 mmol/L Final     Anion Gap   Date Value Ref Range Status   05/19/2022 11 5 - 18 mmol/L Final     Glucose   Date Value Ref Range Status   05/19/2022 89 70 - 125 mg/dL Final     Urea Nitrogen   Date Value Ref Range Status   05/19/2022 31 (H) 8 - 28 mg/dL Final     Creatinine   Date Value Ref Range Status   05/19/2022 0.78 0.60 - 1.10 mg/dL Final     GFR Estimate   Date Value Ref Range Status   05/19/2022 74 >60  mL/min/1.73m2 Final     Comment:     Effective December 21, 2021 eGFRcr in adults is calculated using the 2021 CKD-EPI creatinine equation which includes age and gender (Neeru et al., NEJ, DOI: 10.1056/RVHDlp8256015)   02/23/2018 >60 >60 mL/min/1.73m2 Final     Calcium   Date Value Ref Range Status   05/19/2022 9.5 8.5 - 10.5 mg/dL Final     Assessment/plan:      ICD-10-CM    1. Closed fracture of multiple pubic rami, left, sequela  S32.592S  Scheduled Tylenol 650 mg p.o. every 6 hours.  DC tramadol.  Follow-up CBC unremarkable.    2. Protein-calorie malnutrition, unspecified severity (H)  E46  RD consult.  Continues on MDS.  Edema improved on today's visit.   3. Bronchitis  J40  patient completed a 7-day course of Augmentin.  She continues with a very coarse wet nonproductive cough.  CT of the chest negative during hospitalization.  Duo nebs 3 times daily x5 days completed.  Encourage cough and deep breathing.  Incentive spirometer every 4 hours while awake.  Chest x-ray obtained which showed atelectasis.   4. Hypertension, unspecified type  I10  satisfactory.  Hydrochlorothiazide discontinued during hospitalization.   5. Hyponatremia  E87.1  132.  Hydrochlorothiazide discontinued.  Repeat BMP.   6. 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       This note has been dictated using voice recognition software. Any grammatical or context distortions are unintentional and inherent to the software    Electronically signed by: Daisy Watts CNP

## 2022-06-10 ENCOUNTER — LAB REQUISITION (OUTPATIENT)
Dept: LAB | Facility: CLINIC | Age: 87
End: 2022-06-10
Payer: COMMERCIAL

## 2022-06-10 DIAGNOSIS — E87.1 HYPO-OSMOLALITY AND HYPONATREMIA: ICD-10-CM

## 2022-06-13 LAB
ANION GAP SERPL CALCULATED.3IONS-SCNC: 13 MMOL/L (ref 5–18)
BUN SERPL-MCNC: 18 MG/DL (ref 8–28)
CALCIUM SERPL-MCNC: 9.3 MG/DL (ref 8.5–10.5)
CHLORIDE BLD-SCNC: 102 MMOL/L (ref 98–107)
CO2 SERPL-SCNC: 21 MMOL/L (ref 22–31)
CREAT SERPL-MCNC: 0.62 MG/DL (ref 0.6–1.1)
GFR SERPL CREATININE-BSD FRML MDRD: 86 ML/MIN/1.73M2
GLUCOSE BLD-MCNC: 82 MG/DL (ref 70–125)
POTASSIUM BLD-SCNC: 3.9 MMOL/L (ref 3.5–5)
SODIUM SERPL-SCNC: 136 MMOL/L (ref 136–145)

## 2022-06-13 PROCEDURE — 36415 COLL VENOUS BLD VENIPUNCTURE: CPT | Mod: ORL | Performed by: FAMILY MEDICINE

## 2022-06-13 PROCEDURE — 80048 BASIC METABOLIC PNL TOTAL CA: CPT | Mod: ORL | Performed by: FAMILY MEDICINE

## 2022-06-13 PROCEDURE — P9604 ONE-WAY ALLOW PRORATED TRIP: HCPCS | Mod: ORL | Performed by: FAMILY MEDICINE

## 2022-06-14 ENCOUNTER — TRANSITIONAL CARE UNIT VISIT (OUTPATIENT)
Dept: GERIATRICS | Facility: CLINIC | Age: 87
End: 2022-06-14
Payer: COMMERCIAL

## 2022-06-14 VITALS
RESPIRATION RATE: 14 BRPM | OXYGEN SATURATION: 91 % | HEART RATE: 86 BPM | DIASTOLIC BLOOD PRESSURE: 101 MMHG | SYSTOLIC BLOOD PRESSURE: 175 MMHG | WEIGHT: 95.8 LBS | BODY MASS INDEX: 18.81 KG/M2 | TEMPERATURE: 97.8 F | HEIGHT: 60 IN

## 2022-06-14 DIAGNOSIS — E46 PROTEIN-CALORIE MALNUTRITION, UNSPECIFIED SEVERITY (H): ICD-10-CM

## 2022-06-14 DIAGNOSIS — I10 HYPERTENSION, UNSPECIFIED TYPE: ICD-10-CM

## 2022-06-14 DIAGNOSIS — S32.592S CLOSED FRACTURE OF MULTIPLE PUBIC RAMI, LEFT, SEQUELA: Primary | ICD-10-CM

## 2022-06-14 DIAGNOSIS — J40 BRONCHITIS: ICD-10-CM

## 2022-06-14 PROCEDURE — 99316 NF DSCHRG MGMT 30 MIN+: CPT | Performed by: NURSE PRACTITIONER

## 2022-06-14 NOTE — LETTER
6/14/2022        RE: Crystal Ibarra  [address]  [place] [address]        [facility]    Code Status:  FULL CODE   Visit Type:   Chief Complaint   Patient presents with     Discharge Summary Nursing Home     Facility:  [facility] ([facility]) [[id number]]           Transitional Care Course: Crystal Ibarra is a 86 year old female who I am seeing today for discharge from the TCU.  Patient recently hospitalized on 5/11/2022 status post fall with fracture of the superior pubic Ramus on the left.  Past medical history includes multiple falls, SAH, hypertension, hyponatremia, recent pneumonia/bronchitis and frequent PVCs/bigeminy.  Patient had a mechanical fall on 5/11 in which she tripped and fell backwards.  She presented with significant left hip pain and underwent CT which showed a lateral compression type I left pelvic ring fracture with a small left pelvic sidewall hematoma.  Orthopedics was consulted and treated patient conservatively.  Patient weightbearing as tolerated.  Essential hypertension.  She continues on lisinopril and amlodipine.  Her hydrochlorothiazide was discontinued due to hyponatremia.  Sodium 130.  Acute hypoxic respiratory failure.  Patient had been seen by her PCP on 5/9/2022 with 3 weeks of cough and was treated for possible bronchitis.  She completed her Augmentin of 7 days.  D-dimer was slightly elevated.  She underwent CT of the chest which was negative for PE.  She was initially treated with oxygen but weaned off.  Frequent PVCs/bigeminy.  Patient asymptomatic.  Echo reviewed which showed some LA dilatation otherwise normal LV and RV function.  She did have some tricuspid regurgitation.  Protein malnutrition.  Patient very thin.  It was suggested for primary care provider that she start on boost.    On today's visit patient sitting up in wheelchair. Her son n law and daughter are present on exam.  Patient with recent left pelvic fracture.  Patient  today denying any pain.  She is ambulatory with standby assist with a rolling walker.  Appetite greatly improved.  She continues on supplement.  She does have lower extremity edema that waxes and wanes.  Currently in [facility].  Hypertension.  Blood pressure satisfactory.  Her hydration chlorothiazide was discontinued during hospitalization secondary to hyponatremia.  This is now resolved.  Patient plans to discharge home to her independent living and have her daughter stay with her until her assisted living is ready in 1 month.  Patient with some underlying cognitive impairment however attempts to cover quite well.  Therapy is recommending 24-hour care.    Active Ambulatory Problems     Diagnosis Date Noted     Subarachnoid bleed (H) 02/21/2018     SAH (subarachnoid hemorrhage) (H) 02/21/2018     Hypertensive emergency 02/21/2018     Closed fracture of greater trochanter of left femur (H) 02/21/2018     Scalp laceration 03/06/2018     Fracture of superior pubic ramus, left, closed, initial encounter (H) 05/11/2022     Closed fracture of left inferior pubic ramus, initial encounter (H) 05/11/2022     Scoliosis 05/18/2022     Protein-calorie malnutrition, unspecified severity (H) 05/09/2022     Osteoporosis 05/18/2022     OA (osteoarthritis) 05/18/2022     HTN (hypertension) 05/18/2022     Resolved Ambulatory Problems     Diagnosis Date Noted     No Resolved Ambulatory Problems     No Additional Past Medical History       Current Outpatient Medications:      acetaminophen (TYLENOL) 325 MG tablet, Take 650 mg by mouth every 6 hours, Disp: , Rfl:      amLODIPine (NORVASC) 5 MG tablet, [AMLODIPINE (NORVASC) 5 MG TABLET] Take 1 tablet (5 mg total) by mouth daily., Disp: 30 tablet, Rfl: 1     b complex vitamins tablet, [B COMPLEX VITAMINS TABLET] Take 1 tablet by mouth daily., Disp: , Rfl:      ipratropium - albuterol 0.5 mg/2.5 mg/3 mL (DUONEB) 0.5-2.5 (3) MG/3ML neb solution, Take 1 vial by nebulization 3 times daily,  Disp: , Rfl:      lisinopril (PRINIVIL,ZESTRIL) 20 MG tablet, [LISINOPRIL (PRINIVIL,ZESTRIL) 20 MG TABLET] Take 20 mg by mouth daily., Disp: , Rfl:      MULTIVITAMIN WITH MINERALS (HAIR,SKIN AND NAILS ORAL), [MULTIVITAMIN WITH MINERALS (HAIR,SKIN AND NAILS ORAL)] Take 1 tablet by mouth daily., Disp: , Rfl:      OMEGA-3/DHA/EPA/FISH OIL (FISH OIL-OMEGA-3 FATTY ACIDS) 300-1,000 mg capsule, [OMEGA-3/DHA/EPA/FISH OIL (FISH OIL-OMEGA-3 FATTY ACIDS) 300-1,000 MG CAPSULE] Take 1 g by mouth daily., Disp: , Rfl:   Allergies   Allergen Reactions     Atenolol      Other reaction(s): Sedation  Intol. Fatigue  At 50 mg or more  25 mg OK       All Meds and Allergies reviewed in the record at the facility and is the most up-to-date.    Current Outpatient Medications   Medication Sig     acetaminophen (TYLENOL) 325 MG tablet Take 650 mg by mouth every 6 hours     amLODIPine (NORVASC) 5 MG tablet [AMLODIPINE (NORVASC) 5 MG TABLET] Take 1 tablet (5 mg total) by mouth daily.     b complex vitamins tablet [B COMPLEX VITAMINS TABLET] Take 1 tablet by mouth daily.     ipratropium - albuterol 0.5 mg/2.5 mg/3 mL (DUONEB) 0.5-2.5 (3) MG/3ML neb solution Take 1 vial by nebulization 3 times daily     lisinopril (PRINIVIL,ZESTRIL) 20 MG tablet [LISINOPRIL (PRINIVIL,ZESTRIL) 20 MG TABLET] Take 20 mg by mouth daily.     MULTIVITAMIN WITH MINERALS (HAIR,SKIN AND NAILS ORAL) [MULTIVITAMIN WITH MINERALS (HAIR,SKIN AND NAILS ORAL)] Take 1 tablet by mouth daily.     OMEGA-3/DHA/EPA/FISH OIL (FISH OIL-OMEGA-3 FATTY ACIDS) 300-1,000 mg capsule [OMEGA-3/DHA/EPA/FISH OIL (FISH OIL-OMEGA-3 FATTY ACIDS) 300-1,000 MG CAPSULE] Take 1 g by mouth daily.     No current facility-administered medications for this visit.       REVIEW OF SYSTEMS:   Review of Systems  No fevers or chills. No headache, lightheadedness or dizziness. No SOB, chest pains or palpitations. Appetite varies. No nausea, vomiting, constipation or diarrhea. No dysuria, frequency, burning or  pain with urination. Pain denies any pain on today's visit.  Otherwise review of systems are negative.     PHYSICAL EXAMINATION:  Physical Exam     Vital signs: BP (!) 175/101   Pulse 86   Temp 97.8  F (36.6  C)   Resp 14   Ht 1.524 m (5')   Wt 43.5 kg (95 lb 12.8 oz)   SpO2 91%   BMI 18.71 kg/m    General: Awake, Alert, oriented x3, appropriately, follows simple commands  HEENT: Pink conjunctiva, anicteric sclerae, moist oral mucosa  NECK: Supple  CVS:  S1  S2, without murmur or gallop.   LUNG: Clear to auscultation, No wheezes, rales or rhonci.  BACK: No kyphosis of the thoracic spine  ABDOMEN: Soft, thin, nontender to palpation, with positive bowel sounds  EXTREMITIES: Moves both upper and lower extremities with generalized weakness.  Trace pedal edema, no calf tenderness  SKIN: Bruising to bilateral lower extremities with large scabbed area on the left lower extremity healing.  NEUROLOGIC: Intact, pulses palpable  PSYCHIATRIC: Cognitive impairment noted.      Labs:  All labs reviewed in the nursing home record and Epic   @  Lab Results   Component Value Date    WBC 9.5 05/16/2022     Lab Results   Component Value Date    RBC 4.08 05/16/2022     Lab Results   Component Value Date    HGB 13.3 05/16/2022     Lab Results   Component Value Date    HCT 37.9 05/16/2022     Lab Results   Component Value Date    MCV 93 05/16/2022     Lab Results   Component Value Date    MCH 32.6 05/16/2022     Lab Results   Component Value Date    MCHC 35.1 05/16/2022     Lab Results   Component Value Date    RDW 13.4 05/16/2022     Lab Results   Component Value Date     05/16/2022        @Last Comprehensive Metabolic Panel:  Sodium   Date Value Ref Range Status   06/13/2022 136 136 - 145 mmol/L Final     Potassium   Date Value Ref Range Status   06/13/2022 3.9 3.5 - 5.0 mmol/L Final     Chloride   Date Value Ref Range Status   06/13/2022 102 98 - 107 mmol/L Final     Carbon Dioxide (CO2)   Date Value Ref Range Status    06/13/2022 21 (L) 22 - 31 mmol/L Final     Anion Gap   Date Value Ref Range Status   06/13/2022 13 5 - 18 mmol/L Final     Glucose   Date Value Ref Range Status   06/13/2022 82 70 - 125 mg/dL Final     Urea Nitrogen   Date Value Ref Range Status   06/13/2022 18 8 - 28 mg/dL Final     Creatinine   Date Value Ref Range Status   06/13/2022 0.62 0.60 - 1.10 mg/dL Final     GFR Estimate   Date Value Ref Range Status   06/13/2022 86 >60 mL/min/1.73m2 Final     Comment:     Effective December 21, 2021 eGFRcr in adults is calculated using the 2021 CKD-EPI creatinine equation which includes age and gender (Neeru et al., NEJM, DOI: 10.1056/TRCBcw5698938)   02/23/2018 >60 >60 mL/min/1.73m2 Final     Calcium   Date Value Ref Range Status   06/13/2022 9.3 8.5 - 10.5 mg/dL Final     Assessment/plan:      ICD-10-CM    1. Closed fracture of multiple pubic rami, left, sequela  S32.592S  Scheduled Tylenol 650 mg p.o. every 6 hours.  DC tramadol.  Follow-up CBC unremarkable.    2. Protein-calorie malnutrition, unspecified severity (H)  E46  RD consult.  Continues on MDS.  Edema improved on today's visit.   3. Bronchitis  J40  patient completed a 7-day course of Augmentin.  She continues with a very coarse wet nonproductive cough.  CT of the chest negative during hospitalization.  Duo nebs 3 times daily x5 days completed.  Encourage cough and deep breathing.  Incentive spirometer every 4 hours while awake.  Chest x-ray obtained which showed atelectasis.   4. Hypertension, unspecified type  I10  satisfactory.  Hydrochlorothiazide discontinued during hospitalization.   5. Hyponatremia  E87.1  132.  Hydrochlorothiazide discontinued.  Repeat BMP with sodium of 136.   6. 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     Okay to DC home home with current meds and treatments.  Follow-up with primary care provider in 1 week.  Patient plans to go to her independent living with her daughter until her assisted  living apartment becomes available in 1 month.  We are recommending 24-hour care secondary to underlying cognitive impairment.    DISCHARGE PLAN/FACE TO FACE:  I certify that this patient is under my care and that I, or a nurse practitioner or physician's assistant working with me, had a face-to-face encounter that meets the physician face-to-face encounter requirements with this patient.       I certify that, based on my findings, the following services are medically necessary home health services.    My clinical findings support the need for the above skilled services.    This patient is homebound because: Recent fall with pelvic fracture.    The patient is, or has been, under my care and I have initiated the establishment of the plan of care. This patient will be followed by a physician who will periodically review the plan of care.    Total time spent for this visit was 35 minutes with greater than 50% of time spent face-to-face patient and family reviewing discharge medications, home care services and follow-ups.    This note has been dictated using voice recognition software. Any grammatical or context distortions are unintentional and inherent to the software    Electronically signed by: Daisy Watts CNP           Sincerely,        Daisy Watts NP

## 2023-01-01 ENCOUNTER — APPOINTMENT (OUTPATIENT)
Dept: MRI IMAGING | Facility: HOSPITAL | Age: 88
End: 2023-01-01
Attending: EMERGENCY MEDICINE
Payer: COMMERCIAL

## 2023-01-01 ENCOUNTER — APPOINTMENT (OUTPATIENT)
Dept: RADIOLOGY | Facility: HOSPITAL | Age: 88
End: 2023-01-01
Attending: EMERGENCY MEDICINE
Payer: COMMERCIAL

## 2023-01-01 ENCOUNTER — HOSPITAL ENCOUNTER (INPATIENT)
Facility: CLINIC | Age: 88
LOS: 5 days | Discharge: HOSPICE | DRG: 542 | End: 2023-11-17
Attending: INTERNAL MEDICINE | Admitting: HOSPITALIST
Payer: COMMERCIAL

## 2023-01-01 ENCOUNTER — DOCUMENTATION ONLY (OUTPATIENT)
Dept: GERIATRICS | Facility: CLINIC | Age: 88
End: 2023-01-01
Payer: COMMERCIAL

## 2023-01-01 ENCOUNTER — PATIENT OUTREACH (OUTPATIENT)
Dept: CARE COORDINATION | Facility: CLINIC | Age: 88
End: 2023-01-01
Payer: COMMERCIAL

## 2023-01-01 ENCOUNTER — APPOINTMENT (OUTPATIENT)
Dept: CT IMAGING | Facility: HOSPITAL | Age: 88
End: 2023-01-01
Attending: EMERGENCY MEDICINE
Payer: COMMERCIAL

## 2023-01-01 ENCOUNTER — APPOINTMENT (OUTPATIENT)
Dept: ULTRASOUND IMAGING | Facility: HOSPITAL | Age: 88
End: 2023-01-01
Attending: EMERGENCY MEDICINE
Payer: COMMERCIAL

## 2023-01-01 ENCOUNTER — HOSPITAL ENCOUNTER (EMERGENCY)
Facility: HOSPITAL | Age: 88
Discharge: ANOTHER HOSPITAL | End: 2023-11-11
Attending: EMERGENCY MEDICINE | Admitting: EMERGENCY MEDICINE
Payer: COMMERCIAL

## 2023-01-01 VITALS
HEART RATE: 81 BPM | DIASTOLIC BLOOD PRESSURE: 64 MMHG | SYSTOLIC BLOOD PRESSURE: 104 MMHG | TEMPERATURE: 100.7 F | RESPIRATION RATE: 24 BRPM | OXYGEN SATURATION: 92 %

## 2023-01-01 VITALS
OXYGEN SATURATION: 94 % | RESPIRATION RATE: 16 BRPM | HEIGHT: 59 IN | TEMPERATURE: 98 F | BODY MASS INDEX: 19.88 KG/M2 | HEART RATE: 97 BPM | WEIGHT: 98.6 LBS | DIASTOLIC BLOOD PRESSURE: 65 MMHG | SYSTOLIC BLOOD PRESSURE: 148 MMHG

## 2023-01-01 DIAGNOSIS — R91.1 PULMONARY NODULE: ICD-10-CM

## 2023-01-01 DIAGNOSIS — I60.9 SUBARACHNOID BLEED (H): ICD-10-CM

## 2023-01-01 DIAGNOSIS — N63.20 MASS OF LEFT BREAST, UNSPECIFIED QUADRANT: ICD-10-CM

## 2023-01-01 DIAGNOSIS — M84.48XA SACRAL INSUFFICIENCY FRACTURE, INITIAL ENCOUNTER: ICD-10-CM

## 2023-01-01 DIAGNOSIS — R06.02 SOB (SHORTNESS OF BREATH): ICD-10-CM

## 2023-01-01 DIAGNOSIS — S22.000A THORACIC COMPRESSION FRACTURE, CLOSED, INITIAL ENCOUNTER (H): ICD-10-CM

## 2023-01-01 DIAGNOSIS — S22.000A COMPRESSION FRACTURE OF BODY OF THORACIC VERTEBRA (H): ICD-10-CM

## 2023-01-01 DIAGNOSIS — I60.9 SAH (SUBARACHNOID HEMORRHAGE) (H): Primary | ICD-10-CM

## 2023-01-01 DIAGNOSIS — D72.829 LEUKOCYTOSIS, UNSPECIFIED TYPE: ICD-10-CM

## 2023-01-01 DIAGNOSIS — I60.9 SAH (SUBARACHNOID HEMORRHAGE) (H): ICD-10-CM

## 2023-01-01 LAB
ALBUMIN UR-MCNC: 70 MG/DL
ANION GAP SERPL CALCULATED.3IONS-SCNC: 14 MMOL/L (ref 7–15)
ANION GAP SERPL CALCULATED.3IONS-SCNC: 16 MMOL/L (ref 7–15)
APPEARANCE UR: CLEAR
ATRIAL RATE - MUSE: 117 BPM
ATRIAL RATE - MUSE: 86 BPM
BASOPHILS # BLD AUTO: 0 10E3/UL (ref 0–0.2)
BASOPHILS NFR BLD AUTO: 0 %
BILIRUB UR QL STRIP: NEGATIVE
BUN SERPL-MCNC: 19 MG/DL (ref 8–23)
BUN SERPL-MCNC: 20.4 MG/DL (ref 8–23)
CALCIUM SERPL-MCNC: 10.1 MG/DL (ref 8.8–10.2)
CALCIUM SERPL-MCNC: 9.1 MG/DL (ref 8.8–10.2)
CHLORIDE SERPL-SCNC: 94 MMOL/L (ref 98–107)
CHLORIDE SERPL-SCNC: 96 MMOL/L (ref 98–107)
COLOR UR AUTO: YELLOW
CREAT SERPL-MCNC: 0.53 MG/DL (ref 0.51–0.95)
CREAT SERPL-MCNC: 0.58 MG/DL (ref 0.51–0.95)
CRP SERPL-MCNC: 307.26 MG/L
CRP SERPL-MCNC: 325.4 MG/L
DEPRECATED HCO3 PLAS-SCNC: 22 MMOL/L (ref 22–29)
DEPRECATED HCO3 PLAS-SCNC: 26 MMOL/L (ref 22–29)
DIASTOLIC BLOOD PRESSURE - MUSE: 97 MMHG
DIASTOLIC BLOOD PRESSURE - MUSE: NORMAL MMHG
EGFRCR SERPLBLD CKD-EPI 2021: 87 ML/MIN/1.73M2
EGFRCR SERPLBLD CKD-EPI 2021: 88 ML/MIN/1.73M2
EOSINOPHIL # BLD AUTO: 0 10E3/UL (ref 0–0.7)
EOSINOPHIL NFR BLD AUTO: 0 %
ERYTHROCYTE [DISTWIDTH] IN BLOOD BY AUTOMATED COUNT: 12.9 % (ref 10–15)
ERYTHROCYTE [DISTWIDTH] IN BLOOD BY AUTOMATED COUNT: 13 % (ref 10–15)
GLUCOSE SERPL-MCNC: 140 MG/DL (ref 70–99)
GLUCOSE SERPL-MCNC: 99 MG/DL (ref 70–99)
GLUCOSE UR STRIP-MCNC: NEGATIVE MG/DL
HCT VFR BLD AUTO: 36.8 % (ref 35–47)
HCT VFR BLD AUTO: 40.8 % (ref 35–47)
HGB BLD-MCNC: 12.7 G/DL (ref 11.7–15.7)
HGB BLD-MCNC: 13.9 G/DL (ref 11.7–15.7)
HGB UR QL STRIP: ABNORMAL
HOLD SPECIMEN: NORMAL
HOLD SPECIMEN: NORMAL
IMM GRANULOCYTES # BLD: 0.1 10E3/UL
IMM GRANULOCYTES NFR BLD: 1 %
INTERPRETATION ECG - MUSE: NORMAL
INTERPRETATION ECG - MUSE: NORMAL
KETONES UR STRIP-MCNC: ABNORMAL MG/DL
LACTATE SERPL-SCNC: 1.1 MMOL/L (ref 0.7–2)
LEUKOCYTE ESTERASE UR QL STRIP: NEGATIVE
LYMPHOCYTES # BLD AUTO: 0.8 10E3/UL (ref 0.8–5.3)
LYMPHOCYTES NFR BLD AUTO: 6 %
MAGNESIUM SERPL-MCNC: 1.8 MG/DL (ref 1.7–2.3)
MCH RBC QN AUTO: 32.4 PG (ref 26.5–33)
MCH RBC QN AUTO: 33.3 PG (ref 26.5–33)
MCHC RBC AUTO-ENTMCNC: 34.1 G/DL (ref 31.5–36.5)
MCHC RBC AUTO-ENTMCNC: 34.5 G/DL (ref 31.5–36.5)
MCV RBC AUTO: 95 FL (ref 78–100)
MCV RBC AUTO: 97 FL (ref 78–100)
MONOCYTES # BLD AUTO: 1 10E3/UL (ref 0–1.3)
MONOCYTES NFR BLD AUTO: 7 %
MUCOUS THREADS #/AREA URNS LPF: PRESENT /LPF
NEUTROPHILS # BLD AUTO: 13.1 10E3/UL (ref 1.6–8.3)
NEUTROPHILS NFR BLD AUTO: 86 %
NITRATE UR QL: NEGATIVE
NRBC # BLD AUTO: 0 10E3/UL
NRBC BLD AUTO-RTO: 0 /100
NT-PROBNP SERPL-MCNC: 2929 PG/ML (ref 0–1800)
P AXIS - MUSE: 18 DEGREES
P AXIS - MUSE: 63 DEGREES
PH UR STRIP: 5.5 [PH] (ref 5–7)
PLATELET # BLD AUTO: 450 10E3/UL (ref 150–450)
PLATELET # BLD AUTO: 480 10E3/UL (ref 150–450)
POTASSIUM SERPL-SCNC: 3.6 MMOL/L (ref 3.4–5.3)
POTASSIUM SERPL-SCNC: 4 MMOL/L (ref 3.4–5.3)
PR INTERVAL - MUSE: 156 MS
PR INTERVAL - MUSE: 184 MS
PROCALCITONIN SERPL IA-MCNC: 0.25 NG/ML
QRS DURATION - MUSE: 74 MS
QRS DURATION - MUSE: 80 MS
QT - MUSE: 312 MS
QT - MUSE: 354 MS
QTC - MUSE: 423 MS
QTC - MUSE: 435 MS
R AXIS - MUSE: -30 DEGREES
R AXIS - MUSE: -35 DEGREES
RBC # BLD AUTO: 3.81 10E6/UL (ref 3.8–5.2)
RBC # BLD AUTO: 4.29 10E6/UL (ref 3.8–5.2)
RBC URINE: <1 /HPF
SODIUM SERPL-SCNC: 134 MMOL/L (ref 135–145)
SODIUM SERPL-SCNC: 134 MMOL/L (ref 135–145)
SP GR UR STRIP: 1.03 (ref 1–1.03)
SYSTOLIC BLOOD PRESSURE - MUSE: 149 MMHG
SYSTOLIC BLOOD PRESSURE - MUSE: NORMAL MMHG
T AXIS - MUSE: -10 DEGREES
T AXIS - MUSE: 38 DEGREES
TRANSITIONAL EPI: <1 /HPF
TROPONIN T SERPL HS-MCNC: 29 NG/L
TROPONIN T SERPL HS-MCNC: 34 NG/L
TROPONIN T SERPL HS-MCNC: 36 NG/L
UROBILINOGEN UR STRIP-MCNC: 2 MG/DL
VENTRICULAR RATE- MUSE: 117 BPM
VENTRICULAR RATE- MUSE: 86 BPM
WBC # BLD AUTO: 15.2 10E3/UL (ref 4–11)
WBC # BLD AUTO: 17.9 10E3/UL (ref 4–11)
WBC URINE: 1 /HPF

## 2023-01-01 PROCEDURE — 85027 COMPLETE CBC AUTOMATED: CPT | Performed by: EMERGENCY MEDICINE

## 2023-01-01 PROCEDURE — 250N000013 HC RX MED GY IP 250 OP 250 PS 637: Performed by: PHYSICIAN ASSISTANT

## 2023-01-01 PROCEDURE — 250N000013 HC RX MED GY IP 250 OP 250 PS 637: Performed by: HOSPITALIST

## 2023-01-01 PROCEDURE — 84484 ASSAY OF TROPONIN QUANT: CPT | Performed by: EMERGENCY MEDICINE

## 2023-01-01 PROCEDURE — 81001 URINALYSIS AUTO W/SCOPE: CPT | Performed by: EMERGENCY MEDICINE

## 2023-01-01 PROCEDURE — 85004 AUTOMATED DIFF WBC COUNT: CPT | Performed by: HOSPITALIST

## 2023-01-01 PROCEDURE — 99239 HOSP IP/OBS DSCHRG MGMT >30: CPT | Performed by: INTERNAL MEDICINE

## 2023-01-01 PROCEDURE — 250N000011 HC RX IP 250 OP 636: Mod: JZ | Performed by: EMERGENCY MEDICINE

## 2023-01-01 PROCEDURE — 80048 BASIC METABOLIC PNL TOTAL CA: CPT | Performed by: EMERGENCY MEDICINE

## 2023-01-01 PROCEDURE — 250N000013 HC RX MED GY IP 250 OP 250 PS 637: Performed by: INTERNAL MEDICINE

## 2023-01-01 PROCEDURE — 93005 ELECTROCARDIOGRAM TRACING: CPT

## 2023-01-01 PROCEDURE — 99223 1ST HOSP IP/OBS HIGH 75: CPT | Performed by: HOSPITALIST

## 2023-01-01 PROCEDURE — 120N000001 HC R&B MED SURG/OB

## 2023-01-01 PROCEDURE — 83605 ASSAY OF LACTIC ACID: CPT | Performed by: EMERGENCY MEDICINE

## 2023-01-01 PROCEDURE — 80048 BASIC METABOLIC PNL TOTAL CA: CPT | Performed by: HOSPITALIST

## 2023-01-01 PROCEDURE — 36415 COLL VENOUS BLD VENIPUNCTURE: CPT | Performed by: HOSPITALIST

## 2023-01-01 PROCEDURE — 83735 ASSAY OF MAGNESIUM: CPT | Performed by: EMERGENCY MEDICINE

## 2023-01-01 PROCEDURE — 99232 SBSQ HOSP IP/OBS MODERATE 35: CPT | Performed by: STUDENT IN AN ORGANIZED HEALTH CARE EDUCATION/TRAINING PROGRAM

## 2023-01-01 PROCEDURE — 36415 COLL VENOUS BLD VENIPUNCTURE: CPT | Performed by: EMERGENCY MEDICINE

## 2023-01-01 PROCEDURE — 86140 C-REACTIVE PROTEIN: CPT | Performed by: HOSPITALIST

## 2023-01-01 PROCEDURE — 99285 EMERGENCY DEPT VISIT HI MDM: CPT | Mod: 25

## 2023-01-01 PROCEDURE — 96374 THER/PROPH/DIAG INJ IV PUSH: CPT

## 2023-01-01 PROCEDURE — 84484 ASSAY OF TROPONIN QUANT: CPT | Performed by: HOSPITALIST

## 2023-01-01 PROCEDURE — 99418 PROLNG IP/OBS E/M EA 15 MIN: CPT | Performed by: HOSPITALIST

## 2023-01-01 PROCEDURE — 73590 X-RAY EXAM OF LOWER LEG: CPT | Mod: RT

## 2023-01-01 PROCEDURE — 72148 MRI LUMBAR SPINE W/O DYE: CPT

## 2023-01-01 PROCEDURE — 93005 ELECTROCARDIOGRAM TRACING: CPT | Performed by: EMERGENCY MEDICINE

## 2023-01-01 PROCEDURE — 250N000011 HC RX IP 250 OP 636: Performed by: EMERGENCY MEDICINE

## 2023-01-01 PROCEDURE — 72146 MRI CHEST SPINE W/O DYE: CPT

## 2023-01-01 PROCEDURE — 74177 CT ABD & PELVIS W/CONTRAST: CPT

## 2023-01-01 PROCEDURE — 93010 ELECTROCARDIOGRAM REPORT: CPT | Performed by: INTERNAL MEDICINE

## 2023-01-01 PROCEDURE — 93971 EXTREMITY STUDY: CPT | Mod: RT

## 2023-01-01 PROCEDURE — 84145 PROCALCITONIN (PCT): CPT | Performed by: EMERGENCY MEDICINE

## 2023-01-01 PROCEDURE — 86140 C-REACTIVE PROTEIN: CPT | Performed by: EMERGENCY MEDICINE

## 2023-01-01 PROCEDURE — 73562 X-RAY EXAM OF KNEE 3: CPT | Mod: RT

## 2023-01-01 PROCEDURE — 83880 ASSAY OF NATRIURETIC PEPTIDE: CPT | Performed by: EMERGENCY MEDICINE

## 2023-01-01 PROCEDURE — 99207 PR NO BILLABLE SERVICE THIS VISIT: CPT | Performed by: HOSPITALIST

## 2023-01-01 RX ORDER — NALOXONE HYDROCHLORIDE 0.4 MG/ML
0.4 INJECTION, SOLUTION INTRAMUSCULAR; INTRAVENOUS; SUBCUTANEOUS
Status: DISCONTINUED | OUTPATIENT
Start: 2023-01-01 | End: 2023-01-01

## 2023-01-01 RX ORDER — MORPHINE SULFATE 100 MG/5ML
5 SOLUTION ORAL
Qty: 15 ML | Refills: 0 | Status: SHIPPED | OUTPATIENT
Start: 2023-01-01

## 2023-01-01 RX ORDER — LORAZEPAM 2 MG/ML
0.5 CONCENTRATE ORAL EVERY 4 HOURS PRN
Qty: 30 ML | Refills: 0 | OUTPATIENT
Start: 2023-01-01

## 2023-01-01 RX ORDER — HALOPERIDOL 5 MG/ML
1 INJECTION INTRAMUSCULAR
Status: DISCONTINUED | OUTPATIENT
Start: 2023-01-01 | End: 2023-01-01 | Stop reason: HOSPADM

## 2023-01-01 RX ORDER — MORPHINE SULFATE 100 MG/5ML
5 SOLUTION ORAL
Qty: 30 ML | Refills: 0 | OUTPATIENT
Start: 2023-01-01

## 2023-01-01 RX ORDER — MORPHINE SULFATE 20 MG/ML
10 SOLUTION ORAL
Status: DISCONTINUED | OUTPATIENT
Start: 2023-01-01 | End: 2023-01-01 | Stop reason: HOSPADM

## 2023-01-01 RX ORDER — ACETAMINOPHEN 325 MG/1
650 TABLET ORAL EVERY 4 HOURS PRN
Status: DISCONTINUED | OUTPATIENT
Start: 2023-01-01 | End: 2023-01-01 | Stop reason: HOSPADM

## 2023-01-01 RX ORDER — BISACODYL 10 MG
10 SUPPOSITORY, RECTAL RECTAL DAILY PRN
Status: DISCONTINUED | OUTPATIENT
Start: 2023-01-01 | End: 2023-01-01 | Stop reason: HOSPADM

## 2023-01-01 RX ORDER — ATROPINE SULFATE 10 MG/ML
1 SOLUTION/ DROPS OPHTHALMIC EVERY 4 HOURS PRN
Qty: 5 ML | Refills: 0 | Status: SHIPPED | OUTPATIENT
Start: 2023-01-01

## 2023-01-01 RX ORDER — ALENDRONATE SODIUM 70 MG/1
70 TABLET ORAL
Status: ON HOLD | COMMUNITY
Start: 2023-01-01 | End: 2023-01-01

## 2023-01-01 RX ORDER — GADOBUTROL 604.72 MG/ML
4 INJECTION INTRAVENOUS ONCE
Status: DISCONTINUED | OUTPATIENT
Start: 2023-01-01 | End: 2023-01-01

## 2023-01-01 RX ORDER — ONDANSETRON 2 MG/ML
4 INJECTION INTRAMUSCULAR; INTRAVENOUS EVERY 6 HOURS PRN
Status: DISCONTINUED | OUTPATIENT
Start: 2023-01-01 | End: 2023-01-01 | Stop reason: HOSPADM

## 2023-01-01 RX ORDER — AMOXICILLIN 250 MG
2 CAPSULE ORAL 2 TIMES DAILY PRN
Status: DISCONTINUED | OUTPATIENT
Start: 2023-01-01 | End: 2023-01-01 | Stop reason: HOSPADM

## 2023-01-01 RX ORDER — SENNOSIDES A AND B 8.6 MG/1
1 TABLET, FILM COATED ORAL 2 TIMES DAILY PRN
Qty: 50 TABLET | Refills: 0 | Status: SHIPPED | OUTPATIENT
Start: 2023-01-01

## 2023-01-01 RX ORDER — ONDANSETRON 4 MG/1
4 TABLET, ORALLY DISINTEGRATING ORAL EVERY 6 HOURS PRN
Status: DISCONTINUED | OUTPATIENT
Start: 2023-01-01 | End: 2023-01-01 | Stop reason: HOSPADM

## 2023-01-01 RX ORDER — AMLODIPINE BESYLATE 2.5 MG/1
2.5 TABLET ORAL DAILY
Status: ON HOLD | COMMUNITY
Start: 2023-01-01 | End: 2023-01-01

## 2023-01-01 RX ORDER — ACETAMINOPHEN 650 MG/1
650 SUPPOSITORY RECTAL EVERY 4 HOURS PRN
Qty: 4 SUPPOSITORY | Refills: 0 | Status: SHIPPED | OUTPATIENT
Start: 2023-01-01

## 2023-01-01 RX ORDER — OXYCODONE HYDROCHLORIDE 5 MG/1
10 TABLET ORAL EVERY 4 HOURS PRN
Status: DISCONTINUED | OUTPATIENT
Start: 2023-01-01 | End: 2023-01-01 | Stop reason: HOSPADM

## 2023-01-01 RX ORDER — NALOXONE HYDROCHLORIDE 0.4 MG/ML
0.2 INJECTION, SOLUTION INTRAMUSCULAR; INTRAVENOUS; SUBCUTANEOUS
Status: DISCONTINUED | OUTPATIENT
Start: 2023-01-01 | End: 2023-01-01

## 2023-01-01 RX ORDER — IOPAMIDOL 755 MG/ML
50 INJECTION, SOLUTION INTRAVASCULAR ONCE
Status: COMPLETED | OUTPATIENT
Start: 2023-01-01 | End: 2023-01-01

## 2023-01-01 RX ORDER — NALOXONE HYDROCHLORIDE 0.4 MG/ML
0.2 INJECTION, SOLUTION INTRAMUSCULAR; INTRAVENOUS; SUBCUTANEOUS
Status: DISCONTINUED | OUTPATIENT
Start: 2023-01-01 | End: 2023-01-01 | Stop reason: HOSPADM

## 2023-01-01 RX ORDER — OXYCODONE HYDROCHLORIDE 5 MG/1
5 TABLET ORAL EVERY 4 HOURS PRN
Status: DISCONTINUED | OUTPATIENT
Start: 2023-01-01 | End: 2023-01-01 | Stop reason: HOSPADM

## 2023-01-01 RX ORDER — FENTANYL CITRATE 50 UG/ML
25 INJECTION, SOLUTION INTRAMUSCULAR; INTRAVENOUS ONCE
Status: COMPLETED | OUTPATIENT
Start: 2023-01-01 | End: 2023-01-01

## 2023-01-01 RX ORDER — BISACODYL 10 MG
10 SUPPOSITORY, RECTAL RECTAL DAILY PRN
Qty: 2 SUPPOSITORY | Refills: 0 | Status: SHIPPED | OUTPATIENT
Start: 2023-01-01

## 2023-01-01 RX ORDER — ONDANSETRON 2 MG/ML
4 INJECTION INTRAMUSCULAR; INTRAVENOUS EVERY 6 HOURS PRN
Status: DISCONTINUED | OUTPATIENT
Start: 2023-01-01 | End: 2023-01-01

## 2023-01-01 RX ORDER — HALOPERIDOL 2 MG/ML
1 SOLUTION ORAL EVERY 6 HOURS PRN
Qty: 10 ML | Refills: 0 | Status: SHIPPED | OUTPATIENT
Start: 2023-01-01

## 2023-01-01 RX ORDER — NALOXONE HYDROCHLORIDE 0.4 MG/ML
0.1 INJECTION, SOLUTION INTRAMUSCULAR; INTRAVENOUS; SUBCUTANEOUS
Status: DISCONTINUED | OUTPATIENT
Start: 2023-01-01 | End: 2023-01-01 | Stop reason: HOSPADM

## 2023-01-01 RX ORDER — LORAZEPAM 2 MG/ML
1 INJECTION INTRAMUSCULAR
Status: DISCONTINUED | OUTPATIENT
Start: 2023-01-01 | End: 2023-01-01 | Stop reason: HOSPADM

## 2023-01-01 RX ORDER — ATROPINE SULFATE 10 MG/ML
2 SOLUTION/ DROPS OPHTHALMIC EVERY 4 HOURS PRN
Status: DISCONTINUED | OUTPATIENT
Start: 2023-01-01 | End: 2023-01-01 | Stop reason: HOSPADM

## 2023-01-01 RX ORDER — ACETAMINOPHEN 650 MG/1
650 SUPPOSITORY RECTAL EVERY 4 HOURS PRN
Status: DISCONTINUED | OUTPATIENT
Start: 2023-01-01 | End: 2023-01-01 | Stop reason: HOSPADM

## 2023-01-01 RX ORDER — LANOLIN ALCOHOL/MO/W.PET/CERES
3 CREAM (GRAM) TOPICAL
Status: DISCONTINUED | OUTPATIENT
Start: 2023-01-01 | End: 2023-01-01 | Stop reason: HOSPADM

## 2023-01-01 RX ORDER — AMOXICILLIN 250 MG
1 CAPSULE ORAL 2 TIMES DAILY PRN
Status: DISCONTINUED | OUTPATIENT
Start: 2023-01-01 | End: 2023-01-01 | Stop reason: HOSPADM

## 2023-01-01 RX ORDER — CARBOXYMETHYLCELLULOSE SODIUM 5 MG/ML
1-2 SOLUTION/ DROPS OPHTHALMIC
Status: DISCONTINUED | OUTPATIENT
Start: 2023-01-01 | End: 2023-01-01 | Stop reason: HOSPADM

## 2023-01-01 RX ORDER — POLYETHYLENE GLYCOL 3350 17 G/17G
17 POWDER, FOR SOLUTION ORAL DAILY PRN
Status: DISCONTINUED | OUTPATIENT
Start: 2023-01-01 | End: 2023-01-01 | Stop reason: HOSPADM

## 2023-01-01 RX ORDER — LORAZEPAM 2 MG/ML
0.5 CONCENTRATE ORAL EVERY 4 HOURS PRN
Qty: 30 ML | Refills: 0 | Status: SHIPPED | OUTPATIENT
Start: 2023-01-01

## 2023-01-01 RX ORDER — LORAZEPAM 1 MG/1
1 TABLET ORAL
Status: DISCONTINUED | OUTPATIENT
Start: 2023-01-01 | End: 2023-01-01 | Stop reason: HOSPADM

## 2023-01-01 RX ORDER — ONDANSETRON 4 MG/1
4 TABLET, ORALLY DISINTEGRATING ORAL EVERY 6 HOURS PRN
Status: DISCONTINUED | OUTPATIENT
Start: 2023-01-01 | End: 2023-01-01

## 2023-01-01 RX ADMIN — OXYCODONE HYDROCHLORIDE 5 MG: 5 TABLET ORAL at 17:26

## 2023-01-01 RX ADMIN — OXYCODONE HYDROCHLORIDE 5 MG: 5 TABLET ORAL at 04:43

## 2023-01-01 RX ADMIN — ACETAMINOPHEN 650 MG: 325 TABLET, FILM COATED ORAL at 06:52

## 2023-01-01 RX ADMIN — LORAZEPAM 1 MG: 1 TABLET ORAL at 06:59

## 2023-01-01 RX ADMIN — OXYCODONE HYDROCHLORIDE 5 MG: 5 TABLET ORAL at 14:26

## 2023-01-01 RX ADMIN — OXYCODONE HYDROCHLORIDE 10 MG: 5 TABLET ORAL at 11:08

## 2023-01-01 RX ADMIN — ACETAMINOPHEN 650 MG: 325 TABLET, FILM COATED ORAL at 14:26

## 2023-01-01 RX ADMIN — OXYCODONE HYDROCHLORIDE 10 MG: 5 TABLET ORAL at 15:32

## 2023-01-01 RX ADMIN — ACETAMINOPHEN 650 MG: 325 TABLET, FILM COATED ORAL at 17:26

## 2023-01-01 RX ADMIN — DOCUSATE SODIUM 50 MG AND SENNOSIDES 8.6 MG 1 TABLET: 8.6; 5 TABLET, FILM COATED ORAL at 18:28

## 2023-01-01 RX ADMIN — OXYCODONE HYDROCHLORIDE 5 MG: 5 TABLET ORAL at 09:05

## 2023-01-01 RX ADMIN — ACETAMINOPHEN 650 MG: 325 TABLET, FILM COATED ORAL at 15:33

## 2023-01-01 RX ADMIN — OXYCODONE HYDROCHLORIDE 5 MG: 5 TABLET ORAL at 22:24

## 2023-01-01 RX ADMIN — ACETAMINOPHEN 650 MG: 325 TABLET, FILM COATED ORAL at 09:05

## 2023-01-01 RX ADMIN — LORAZEPAM 1 MG: 1 TABLET ORAL at 10:39

## 2023-01-01 RX ADMIN — OXYCODONE HYDROCHLORIDE 5 MG: 5 TABLET ORAL at 04:24

## 2023-01-01 RX ADMIN — OXYCODONE HYDROCHLORIDE 5 MG: 5 TABLET ORAL at 18:27

## 2023-01-01 RX ADMIN — ACETAMINOPHEN 650 MG: 325 TABLET, FILM COATED ORAL at 11:07

## 2023-01-01 RX ADMIN — IOPAMIDOL 50 ML: 755 INJECTION, SOLUTION INTRAVENOUS at 18:29

## 2023-01-01 RX ADMIN — OXYCODONE HYDROCHLORIDE 5 MG: 5 TABLET ORAL at 09:39

## 2023-01-01 RX ADMIN — OXYCODONE HYDROCHLORIDE 10 MG: 5 TABLET ORAL at 22:13

## 2023-01-01 RX ADMIN — OXYCODONE HYDROCHLORIDE 10 MG: 5 TABLET ORAL at 06:57

## 2023-01-01 RX ADMIN — ACETAMINOPHEN 650 MG: 325 TABLET, FILM COATED ORAL at 18:28

## 2023-01-01 RX ADMIN — FENTANYL CITRATE 25 MCG: 50 INJECTION, SOLUTION INTRAMUSCULAR; INTRAVENOUS at 23:06

## 2023-01-01 RX ADMIN — LORAZEPAM 1 MG: 1 TABLET ORAL at 21:48

## 2023-01-01 RX ADMIN — MORPHINE SULFATE 10 MG: 100 SOLUTION ORAL at 11:56

## 2023-01-01 RX ADMIN — OXYCODONE HYDROCHLORIDE 5 MG: 5 TABLET ORAL at 07:56

## 2023-01-01 RX ADMIN — ACETAMINOPHEN 650 MG: 325 TABLET, FILM COATED ORAL at 15:25

## 2023-01-01 RX ADMIN — OXYCODONE HYDROCHLORIDE 10 MG: 5 TABLET ORAL at 15:25

## 2023-01-01 RX ADMIN — ACETAMINOPHEN 650 MG: 325 TABLET, FILM COATED ORAL at 09:39

## 2023-01-01 ASSESSMENT — ACTIVITIES OF DAILY LIVING (ADL)
ADLS_ACUITY_SCORE: 49
ADLS_ACUITY_SCORE: 54
ADLS_ACUITY_SCORE: 56
ADLS_ACUITY_SCORE: 45
ADLS_ACUITY_SCORE: 56
ADLS_ACUITY_SCORE: 45
ADLS_ACUITY_SCORE: 54
ADLS_ACUITY_SCORE: 49
ADLS_ACUITY_SCORE: 56
ADLS_ACUITY_SCORE: 56
ADLS_ACUITY_SCORE: 45
ADLS_ACUITY_SCORE: 35
ADLS_ACUITY_SCORE: 56
ADLS_ACUITY_SCORE: 49
ADLS_ACUITY_SCORE: 56
ADLS_ACUITY_SCORE: 45
ADLS_ACUITY_SCORE: 49
ADLS_ACUITY_SCORE: 57
ADLS_ACUITY_SCORE: 54
ADLS_ACUITY_SCORE: 54
ADLS_ACUITY_SCORE: 36
ADLS_ACUITY_SCORE: 54
ADLS_ACUITY_SCORE: 35
ADLS_ACUITY_SCORE: 54
ADLS_ACUITY_SCORE: 38
ADLS_ACUITY_SCORE: 56
ADLS_ACUITY_SCORE: 45
ADLS_ACUITY_SCORE: 49
ADLS_ACUITY_SCORE: 35
ADLS_ACUITY_SCORE: 57
ADLS_ACUITY_SCORE: 54
ADLS_ACUITY_SCORE: 36
ADLS_ACUITY_SCORE: 56
ADLS_ACUITY_SCORE: 49
ADLS_ACUITY_SCORE: 57
ADLS_ACUITY_SCORE: 56
ADLS_ACUITY_SCORE: 45
ADLS_ACUITY_SCORE: 54
ADLS_ACUITY_SCORE: 56
ADLS_ACUITY_SCORE: 49
ADLS_ACUITY_SCORE: 45
ADLS_ACUITY_SCORE: 54
ADLS_ACUITY_SCORE: 45
ADLS_ACUITY_SCORE: 49
ADLS_ACUITY_SCORE: 54
ADLS_ACUITY_SCORE: 38
ADLS_ACUITY_SCORE: 45
ADLS_ACUITY_SCORE: 45
ADLS_ACUITY_SCORE: 56
ADLS_ACUITY_SCORE: 45
ADLS_ACUITY_SCORE: 54
ADLS_ACUITY_SCORE: 50
ADLS_ACUITY_SCORE: 56
ADLS_ACUITY_SCORE: 35
ADLS_ACUITY_SCORE: 56
ADLS_ACUITY_SCORE: 56
ADLS_ACUITY_SCORE: 57
ADLS_ACUITY_SCORE: 45
ADLS_ACUITY_SCORE: 54
ADLS_ACUITY_SCORE: 54
ADLS_ACUITY_SCORE: 33
ADLS_ACUITY_SCORE: 45
ADLS_ACUITY_SCORE: 56
ADLS_ACUITY_SCORE: 56
ADLS_ACUITY_SCORE: 49
ADLS_ACUITY_SCORE: 56

## 2023-01-01 ASSESSMENT — ENCOUNTER SYMPTOMS
DYSURIA: 0
DIARRHEA: 0
VOMITING: 0
ABDOMINAL PAIN: 0
FEVER: 0
BACK PAIN: 1
SHORTNESS OF BREATH: 0

## 2023-11-11 NOTE — ED TRIAGE NOTES
Patient had a back fracture that she has been nursing for 2 weeks with the use of tylenol. Patient is in assisted living and today when the nurse came in, she can not stand with out pain and is unable to get to a standing position without help.      Triage Assessment (Adult)       Row Name 11/11/23 1449          Triage Assessment    Airway WDL WDL        Respiratory WDL    Respiratory WDL WDL        Skin Circulation/Temperature WDL    Skin Circulation/Temperature WDL WDL        Cardiac WDL    Cardiac WDL WDL        Peripheral/Neurovascular WDL    Peripheral Neurovascular WDL WDL        Cognitive/Neuro/Behavioral WDL    Cognitive/Neuro/Behavioral WDL WDL

## 2023-11-11 NOTE — ED NOTES
Pt was able to move her legs off the bed w/o c/o back pain. Using transfer belt and walker, she was able to stand but was unable to move her feet c/o pain R calf. Provider aware. Will straight cath for urine.

## 2023-11-11 NOTE — ED PROVIDER NOTES
EMERGENCY DEPARTMENT ENCOUNTER      NAME: Crystal Ibarra  AGE: 88 year old female  YOB: 1935  MRN: [id number]  EVALUATION DATE & TIME: 11/11/2023  2:51 PM    PCP: Arcelia Pierre    ED PROVIDER: Gabrielle Dean M.D.        Chief Complaint   Patient presents with    Back Pain    Leg Pain         FINAL IMPRESSION:    1. Thoracic compression fracture, closed, initial encounter (H)    2. Leukocytosis, unspecified type    3. SOB (shortness of breath)    4. Mass of left breast, unspecified quadrant    5. Pulmonary nodule    6. Sacral insufficiency fracture, initial encounter            MEDICAL DECISION MAKING:    Crystal Ibarra is a 88 year old female with history of subarachnoid hemorrhage, hypertension, dementia, pubic ramus fracture, thoracic compression fracture who presents to the ER with complaints of back pain.  Patient with known T7 compression fracture from a few weeks ago.  Increased pain today.  Multiple images were obtained with MRI and CT imaging and found to have acute sacral insufficiency fractures which may be contributing to her pain with standing.  She does have significant T7 compression fracture with retropulsion and severe edema in the area.  Unclear if this is just due to the fracture itself or possible infection.  Unfortunately contrast was not done.  Spoke with radiologist who was not sure that contrast would really make this a whole lot more clear at this time.  Spoke with neurosurgery and infectious disease who did not recommend antibiotics at this time.  Neurosurgery does recommend transferring her to [facility] as the patient may have some weakness in the right leg versus just limitations due to pain and may be evaluated for consideration for surgery given the retropulsion of her T7 compression fracture.  WBC elevated at nearly 18 without a clear source.  At this time cannot rule out spinal infection.  Spoke with infectious disease who again did not want antibiotics  and no other source for infection identified in the chest, abdomen, or urine at this time.  Patient denies any other infectious symptoms.  Hemodynamically stable at this time.  She was excepted and transferred to [facility] and patient and family at bedside agree with this plan.      ED COURSE:  3:18 PM  I met with the patient to gather history and perform my exam. ED course and treatment discussed.    4:19 PM  Nursing tried to get patient up to the bathroom.  She was able to turn in bed, move her legs without difficulty, but upon standing she complained of severe pain.  She did not want to move her right leg.  She reports a nurse to RN at that time that it was the right calf denies any hip or back pain at the time while standing.    7:12 PM  Called to speak with the radiologist who read the MRI imaging but unfortunately he is involved in a stroke evaluation case and he will call me back after that is completed.    7:39 PM  Spoke with neurosurgery who agrees with admission to the hospital and further evaluation by neurosurgery to see whether or not surgery would be appropriate for this patient given the fact that she may have a new diagnosis of breast cancer.  She does not have any opinion about antibiotics with concerns for possible spine infection and recommend speaking to infectious disease.    8:29 PM  Spoke with Dr. Yeh of infectious disease who does not want me to start any antibiotics at this time.  Especially if were concerned about spinal infection he would prefer to possibly do biopsy first.  Neurosurgery called me back and recommends that patient be transferred to [facility] for consideration for surgery due to her right leg weakness on my exam.    8:42 PM  Updated patient, daughter and son-in-law at bedside and all results.  They agree with transfer to [facility].  I did update daughter about the breast mass and lung nodule as well.  Patient states that she is feeling much  better from a respiratory standpoint and felt that her shortness of breath is better due to the fact that she was lying flat for MRI causing pain and some anxiety.  She is on 2 L nasal cannula oxygen at this time.  I did wean her off oxygen briefly and her sat went down to 91.  We will keep her on 2 L at this time as she feels much better.  Heart rate now 101 bpm.    9:20 PM  Patient has been accepted in transfer to [facility] by Dr. Bolton, hospitalist.  He feels the patient will be best served on an orthospine bed and the bed placement team states that there is a bed available.  They will call us back with room assignment for nursing discussion.  Patient is otherwise hemodynamically stable at this time.  I updated patient's family at bedside that a bed should become available this evening and they are happy about this plan.    I do not think that this represents ACS, PE, ruptured AAA, aortic dissection, cholecystitis, bowel obstruction, bowel ischemia, kidney stone, pyelonephritis, ovarian torsion, or other such etiologies at this time.    At the conclusion of the encounter I discussed the results of all of the tests and the disposition. Their questions were answered. The patient (and any family present) acknowledged understanding and were agreeable with the care plan.      CONSULTANTS:  Radiology - Dr. VILLALOBOS   Neurosurgery - CHAVA Cheek  ID - Dr. Yeh          MEDICATIONS GIVEN IN THE EMERGENCY:  Medications   iopamidol (ISOVUE-370) solution 50 mL (50 mLs Intravenous $Given 11/11/23 2360)           NEW PRESCRIPTIONS STARTED AT TODAY'S ER VISIT  none        CONDITION:  stable        DISPOSITION:  Xfer to [facility] as accepted by Dr. Bolton, hospitalist         =================================================================  =================================================================  TRIAGE ASSESSMENT:  Patient had a back fracture that she has been nursing for 2 weeks with the use of  "tylenol. Patient is in assisted living and today when the nurse came in, she can not stand with out pain and is unable to get to a standing position without help.         ED Triage Vitals [11/11/23 1447]   Enc Vitals Group      BP (!) 184/100      Pulse 93      Resp 16      Temp 98  F (36.7  C)      Temp src Oral      SpO2 94 %      Weight 44.7 kg (98 lb 9.6 oz)      Height 1.499 m (4' 11\")          ================================================================  ================================================================    HPI    Patient information was obtained from: patient, daughter and son-in-law    Use of Intrepreter: N/A      Crystal Ibarra is a 88 year old female with history of subarachnoid hemorrhage, hypertension, dementia, pubic ramus fracture, thoracic compression fracture who presents to the ER with complaints of back pain.    Family reports she was diagnosed with a thoracic compression fracture about 2 weeks ago.  She has been treating this with Tylenol at her assisted living facility and seem to be doing okay until today.  She called her nurse alert button because she was unable to get out of bed today.  Family reports that they had extreme difficulty getting her up today as standing causes significant pain.  Patient indicates that the pain is in her calf area and this prevents her from putting weight on her leg.    In the ER at this time the patient denies any significant pain.    No known falls.    Otherwise no known chest pain, shortness of breath, abdominal pain, vomiting or diarrhea.      CHART REVIEW from [facility]:  EXAM: XR SPINE THORACIC 2 VIEWS   LOCATION: [facility] MEDICAL IMAGING   DATE: 11/7/2023     INDICATION: Severe Back Pain   COMPARISON: None.     IMPRESSION:   S-shaped thoracolumbar curvature. Thoracic dextrocurvature measures 29 degrees measured from T6 through T11. Incompletely imaged lumbar levocurvature measuring at least 32 degrees. Focal kyphosis about the T7 " compression deformity measures 31 degrees. T7 compression deformity with 70% anterior height loss. Remaining vertebral body heights are maintained. No aggressive osseous abnormality. Degenerative disc disease throughout the thoracic and included upper lumbar spine. No acute extraspinal abnormality.       REVIEW OF SYSTEMS  Review of Systems   Constitutional:  Negative for fever.   Respiratory:  Negative for shortness of breath.    Cardiovascular:  Negative for chest pain.   Gastrointestinal:  Negative for abdominal pain, diarrhea and vomiting.   Genitourinary:  Negative for dysuria.   Musculoskeletal:  Positive for back pain.   All other systems reviewed and are negative.        PAST MEDICAL HISTORY:  Past Medical History:   Diagnosis Date    Dementia (H)          PAST SURGICAL HISTORY:  History reviewed. No pertinent surgical history.      CURRENT MEDICATIONS:    Prior to Admission medications    Medication Sig Start Date End Date Taking? Authorizing Provider   acetaminophen (TYLENOL) 325 MG tablet Take 650 mg by mouth every 6 hours as needed    Reported, Patient   amLODIPine (NORVASC) 5 MG tablet [AMLODIPINE (NORVASC) 5 MG TABLET] Take 1 tablet (5 mg total) by mouth daily. 2/26/18   Brandon Ruiz MD   b complex vitamins tablet [B COMPLEX VITAMINS TABLET] Take 1 tablet by mouth daily. 2/21/18   Provider, Historical   ipratropium - albuterol 0.5 mg/2.5 mg/3 mL (DUONEB) 0.5-2.5 (3) MG/3ML neb solution Take 1 vial by nebulization 3 times daily    Reported, Patient   lisinopril (PRINIVIL,ZESTRIL) 20 MG tablet [LISINOPRIL (PRINIVIL,ZESTRIL) 20 MG TABLET] Take 20 mg by mouth daily. 2/21/18   Provider, Historical   MULTIVITAMIN WITH MINERALS (HAIR,SKIN AND NAILS ORAL) [MULTIVITAMIN WITH MINERALS (HAIR,SKIN AND NAILS ORAL)] Take 1 tablet by mouth daily. 2/21/18   Provider, Historical   OMEGA-3/DHA/EPA/FISH OIL (FISH OIL-OMEGA-3 FATTY ACIDS) 300-1,000 mg capsule [OMEGA-3/DHA/EPA/FISH OIL (FISH OIL-OMEGA-3 FATTY ACIDS)  "300-1,000 MG CAPSULE] Take 1 g by mouth daily. 2/21/18   Provider, Historical   hydrochlorothiazide (HYDRODIURIL) 25 MG tablet Take 25 mg by mouth daily  5/17/22  Unknown, Entered By History         ALLERGIES:  Allergies   Allergen Reactions    Atenolol      Other reaction(s): Sedation  Intol. Fatigue  At 50 mg or more  25 mg OK         FAMILY HISTORY:  History reviewed. No pertinent family history.      SOCIAL HISTORY:  Social History     Socioeconomic History    Marital status:    Tobacco Use    Smoking status: Unknown    Smokeless tobacco: Never   Substance and Sexual Activity    Alcohol use: Yes     Alcohol/week: 3.0 standard drinks of alcohol    Drug use: No         VITALS:  Patient Vitals for the past 24 hrs:   BP Temp Temp src Pulse Resp SpO2 Height Weight   11/11/23 1447 (!) 184/100 98  F (36.7  C) Oral 93 16 94 % 1.499 m (4' 11\") 44.7 kg (98 lb 9.6 oz)       Wt Readings from Last 3 Encounters:   11/11/23 44.7 kg (98 lb 9.6 oz)   06/14/22 43.5 kg (95 lb 12.8 oz)   06/07/22 42.7 kg (94 lb 3.2 oz)       Estimated Creatinine Clearance: 47.3 mL/min (based on SCr of 0.58 mg/dL).    PHYSICAL EXAM    Constitutional:  Well developed, Well nourished, NAD, GCS 15  HENT:  Normocephalic, Atraumatic, Bilateral external ears normal,  Nose normal. Neck- Supple, No stridor.   Eyes:  PERRL, EOMI, Conjunctiva normal, No discharge.  Respiratory:  Normal breath sounds, No respiratory distress, No wheezing, Speaks full sentences easily. No cough.   Cardiovascular:  Normal heart rate, Regular rhythm, No rubs, No gallops. Chest wall nontender.   GI:  No excessive obesity.  Bowel sounds normal, Soft, No tenderness, No masses, No flank tenderness. No rebound or guarding.  : deferred  Musculoskeletal: 2+ DP pulses. No edema. No cyanosis, No clubbing. Good range of motion in all major joints. No tenderness to palpation or major deformities noted. No tenderness of the CLS spine. +mild thoracic tenderness at bra line (c/w known " T7 compression fracture)  Integument:  Warm, Dry, No erythema, No rash.  No petechiae.   Neurologic:  Alert &  appropriate, +able to left left leg off bed without limitation or pain, lifting right leg appears painful and limited but pt suggests that it hurts in her calf area. No bony tenderness of lower back/pelvis/hips/femur/tib/fib/ankle bilaterally  Psychiatric:  Affect normal, Cooperative         LAB:  All pertinent labs reviewed and interpreted.  Recent Results (from the past 24 hour(s))   CBC (+ platelets, no diff)    Collection Time: 11/11/23  3:47 PM   Result Value Ref Range    WBC Count 17.9 (H) 4.0 - 11.0 10e3/uL    RBC Count 4.29 3.80 - 5.20 10e6/uL    Hemoglobin 13.9 11.7 - 15.7 g/dL    Hematocrit 40.8 35.0 - 47.0 %    MCV 95 78 - 100 fL    MCH 32.4 26.5 - 33.0 pg    MCHC 34.1 31.5 - 36.5 g/dL    RDW 13.0 10.0 - 15.0 %    Platelet Count 480 (H) 150 - 450 10e3/uL   Basic metabolic panel    Collection Time: 11/11/23  3:47 PM   Result Value Ref Range    Sodium 134 (L) 135 - 145 mmol/L    Potassium 4.0 3.4 - 5.3 mmol/L    Chloride 94 (L) 98 - 107 mmol/L    Carbon Dioxide (CO2) 26 22 - 29 mmol/L    Anion Gap 14 7 - 15 mmol/L    Urea Nitrogen 20.4 8.0 - 23.0 mg/dL    Creatinine 0.58 0.51 - 0.95 mg/dL    GFR Estimate 87 >60 mL/min/1.73m2    Calcium 10.1 8.8 - 10.2 mg/dL    Glucose 140 (H) 70 - 99 mg/dL   Extra Blue Top Tube    Collection Time: 11/11/23  3:47 PM   Result Value Ref Range    Hold Specimen JIC    Extra Red Top Tube    Collection Time: 11/11/23  3:47 PM   Result Value Ref Range    Hold Specimen JIC    Procalcitonin    Collection Time: 11/11/23  3:47 PM   Result Value Ref Range    Procalcitonin 0.25 (H) <0.05 ng/mL   CRP inflammation    Collection Time: 11/11/23  3:47 PM   Result Value Ref Range    CRP Inflammation 325.40 (H) <5.00 mg/L   Troponin T, High Sensitivity (now)    Collection Time: 11/11/23  3:47 PM   Result Value Ref Range    Troponin T, High Sensitivity 29 (H) <=14 ng/L   Magnesium     "Collection Time: 11/11/23  3:47 PM   Result Value Ref Range    Magnesium 1.8 1.7 - 2.3 mg/dL   Nt probnp inpatient    Collection Time: 11/11/23  3:47 PM   Result Value Ref Range    N terminal Pro BNP Inpatient 2,929 (H) 0 - 1,800 pg/mL   UA with Microscopic reflex to Culture    Collection Time: 11/11/23  4:41 PM    Specimen: Urine, Catheter   Result Value Ref Range    Color Urine Yellow Colorless, Straw, Light Yellow, Yellow    Appearance Urine Clear Clear    Glucose Urine Negative Negative mg/dL    Bilirubin Urine Negative Negative    Ketones Urine Trace (A) Negative mg/dL    Specific Gravity Urine 1.026 1.001 - 1.030    Blood Urine 0.06 mg/dL (A) Negative    pH Urine 5.5 5.0 - 7.0    Protein Albumin Urine 70 (A) Negative mg/dL    Urobilinogen Urine 2.0 (A) <2.0 mg/dL    Nitrite Urine Negative Negative    Leukocyte Esterase Urine Negative Negative    Mucus Urine Present (A) None Seen /LPF    RBC Urine <1 <=2 /HPF    WBC Urine 1 <=5 /HPF    Transitional Epithelials Urine <1 <=1 /HPF   Lactic acid whole blood    Collection Time: 11/11/23  7:40 PM   Result Value Ref Range    Lactic Acid 1.1 0.7 - 2.0 mmol/L       No results found for: \"ABORH\"        RADIOLOGY:  Reviewed all pertinent imaging. Please see official radiology report.    US Lower Extremity Venous Duplex Right   Final Result   IMPRESSION:   1.  No deep venous thrombosis in the right lower extremity.      2.  Pulsatile venous flow suggestive of right heart dysfunction.      3.  Moderate to large knee effusion.      CT Chest/Abdomen/Pelvis w Contrast   Final Result   IMPRESSION:   1.  No acute abnormality in the chest, abdomen, or pelvis.   2.  Left breast mass measuring 2.2 x 3.1 x 3.4 cm, suspicious for cancer. Lesion would be amenable to percutaneous biopsy if needed.   3.  Lingular pulmonary nodule suspicious for neoplasm also, and may represent primary lung cancer or metastatic disease.   4.  Moderate emphysema and bibasilar atelectasis.   5.  Trace left " effusion.   6.  Aortic valve calcification correlate for possible stenosis.   7.  Moderate sigmoid diverticulosis but no diverticulitis.      MR Lumbar Spine w/o Contrast   Final Result   Addendum (preliminary) 1 of 1   Described in the FINDINGS but inadvertently omitted in the IMPRESSION of    the report is serpiginous bone marrow replacement involving the proximal    to mid right humerus, inadequately assessed on this exam and new since    11/05/2018, indeterminate. Correlate    with dedicated right humerus radiographs.      The remainder of the report is unchanged.      Final   IMPRESSION:   Limited exam, as above.      THORACIC SPINE MRI:   1.  A severe compression fracture deformity at T7 with retropulsion contributing to severe spinal canal stenosis.   2.  Bone marrow edema diffusely involving the T7 and T8 vertebral bodies with paraspinal soft tissue induration and ventral epidural thickening, indeterminate due to exam limitations and absence of postcontrast images. Findings may represent reactive    bone marrow edema and epidural/paraspinal hematoma in the setting of acute fracture. However, superimposed infection or underlying neoplastic process are not excluded. A contrast-enhanced thoracic spine MRI is recommended for better assessment.      LUMBAR SPINE MRI:   1.  No acute lumbar spine compression fracture. No focal bone marrow edema to suggest an infectious process.   2.  Findings concerning for an acute bilateral sacral insufficiency fractures.   3.  Multilevel spondylosis with severe spinal canal stenosis at L4-L5.   4.  Multilevel high-grade foraminal narrowing, as described.      MR Thoracic Spine w/o Contrast   Final Result   Addendum (preliminary) 1 of 1   Described in the FINDINGS but inadvertently omitted in the IMPRESSION of    the report is serpiginous bone marrow replacement involving the proximal    to mid right humerus, inadequately assessed on this exam and new since    11/05/2018,  indeterminate. Correlate    with dedicated right humerus radiographs.      The remainder of the report is unchanged.      Final   IMPRESSION:   Limited exam, as above.      THORACIC SPINE MRI:   1.  A severe compression fracture deformity at T7 with retropulsion contributing to severe spinal canal stenosis.   2.  Bone marrow edema diffusely involving the T7 and T8 vertebral bodies with paraspinal soft tissue induration and ventral epidural thickening, indeterminate due to exam limitations and absence of postcontrast images. Findings may represent reactive    bone marrow edema and epidural/paraspinal hematoma in the setting of acute fracture. However, superimposed infection or underlying neoplastic process are not excluded. A contrast-enhanced thoracic spine MRI is recommended for better assessment.      LUMBAR SPINE MRI:   1.  No acute lumbar spine compression fracture. No focal bone marrow edema to suggest an infectious process.   2.  Findings concerning for an acute bilateral sacral insufficiency fractures.   3.  Multilevel spondylosis with severe spinal canal stenosis at L4-L5.   4.  Multilevel high-grade foraminal narrowing, as described.      XR Tibia and Fibula Right 2 Views   Final Result   IMPRESSION:    1. Old healed proximal tibia fracture with affixing hardware.   2. Old healed impaction fracture of the lateral tibial plateau.    3. Diffuse bone demineralization and arterial calcification.   4. Chondrocalcinosis in the medial and lateral compartments of the knee.   5. No evidence of acute or subacute fracture.      XR Knee Right 3 Views   Final Result   IMPRESSION: Chondrocalcinosis in all 3 compartments. Diffuse bone demineralization and arterial calcification. Moderate effusion, nonspecific. Mild osteoarthrosis in the medial and lateral compartments. Severe osteoarthrosis in the patellofemoral    compartment. No evidence of acute or subacute fracture.            EKG:    Indication:  tachycardia    Performed at: 18:37p  Impression: Sinus tachycardia at 117 bpm with frequent PVCs.  Flipped T waves noted in lead aVR and V1-V2.  HI interval 184 ms, QRS 74 ms, QTc 435 ms.  Nonspecific changes compared to May 11, 2022 at which time she was having frequent PVCs as well.      I have independently reviewed and interpreted the EKG(s) documented above.        PROCEDURES:  none      Medical Decision Making    History:  Supplemental history from: Documented in chart, if applicable and Family Member/Significant Other  External Record(s) reviewed: Documented in chart, if applicable.    Work Up:  Chart documentation includes differential considered and any EKGs or imaging independently interpreted by provider, where specified.  In additional to work up documented, I considered the following work up: Documented in chart, if applicable.    External consultation:  Discussion of management with another provider: Documented in chart, if applicable    Complicating factors:  Care impacted by chronic illness: Other: Compression fracture  Care affected by social determinants of health: Access to Medical Care    Disposition considerations: Admit.        Gabrielle Dean M.D. [facility]  Emergency Medicine and Medical Toxicology  [facility] EMERGENCY DEPARTMENT  [address] [place] [id number]  [phone]  Dept: [phone]           Gabrielle Dean MD  11/11/23 2089

## 2023-11-12 PROBLEM — S22.000A COMPRESSION FRACTURE OF BODY OF THORACIC VERTEBRA (H): Status: ACTIVE | Noted: 2023-01-01

## 2023-11-12 NOTE — PROGRESS NOTES
Brief hospitalist note  Seen and examined  Discussed with patient, daughter, and son in law at the bedside We noted that the patient developed severe back pain and new severe back pain in the setting of known compression fracture. MRI with bone marrow edema of the T7 and T8 vertebral bodies with paraspinal soft tissue induration and ventral epidural thickening most consistent with bone marrow edema and paraspinal hematoma from the fracture. However, she also has a large breast and lung mass, raising concerns for potential pathological compression fracture. The patient has moderate dementia and does not want any procedure, even breast biopsy. Discussed with the neurosurgical service, who described substantial morbidity from surgery with prolonged therapy. Family is not convinced she would be able to work with therapy and rehab given dementia.     Patient does not want surgery, and does not want workup for her breast and lung mass. Daughter at the bedside agrees with her. Over the patient desires minimal care and would find substantial burden from cares necessary to treat the acute fracture and likely advanced malignancy. All are agreeable to hospice, hopefully at her facility with her friends. We will enact measures focused on comfort and stop labs. No plan for NSTEMI or cardiac workup.     Discussed with , ideally she can return to her facility on hospice tomorrow with hospice cares    Otherwise cares per today's history and physical      Bahman Chen DO  Hospitalist [facility]  Pager: [phone]

## 2023-11-12 NOTE — PROVIDER NOTIFICATION
MD Notification    Notified Person: MD    Notified Person Name: Bahman Chen    Notification Date/Time: 11/12 7:35am    Notification Interaction: [employer] messaging    Purpose of Notification: Pt's bladder scan is 581, would you like to initiate bladder management protocol?     Orders Received: Orders placed    Comments:

## 2023-11-12 NOTE — PROGRESS NOTES
Neurosurgery    We have been notified that patient is not interested in surgical intervention or further work-up and will proceed to Hospice care.     We will sign off.     MARLENY Anton, CHAVA  [facility]  [facility]     Tel: [phone]  Pager: [phone]

## 2023-11-12 NOTE — ED NOTES
EMS BLS here to transport patient to [facility].  Fentanyl 25mcg IV x 1 will be given here prior to transport for pain control.     Gabrielle Dean MD  11/11/23 9112

## 2023-11-12 NOTE — PLAN OF CARE
Goal Outcome Evaluation:         Pt A&Ox3, disoriented to situation. VSS on 2L NC O2 ex hypertensive. Prn oxy given x1. Neuros intact ex unequal pupils, baseline per pt, is able to answer which pupil is bigger. Tele NSR w/ occasional PVCs. Bedrest. NPO. Purewick in place. Bladder scan 581. Orders placed for bladder management protocol, oncoming RN aware. Denies N/T, CP, SOB. PIV SL.

## 2023-11-12 NOTE — H&P
[facility]    History and Physical  Hospitalist       Date of Admission:  11/11/2023  Date of Service (when I saw the patient): 11/12/23    ASSESSMENT  Crystal Ibarra is a markedly pleasant 88 year old woman with past medical history that is most notable for chronic dementia, as well as osteoporosis, prior hip and knee replacements, and prior pelvic fractures, who presents with progressive back pain and is found to have acute T7 thoracic spine compression fracture, possible acute thoracic spine discitis, as well as suspected newly found metastatic cancer, and acute NSTEMI.    PLAN     Acute T7 thoracic spine compression fracture, possible acute thoracic spine discitis: large breast mass. Of note, Ms. Ibarra has chronic dementia. Prior notes document a history about two years ago reportedly of pelvic fractures. She initially presented to a clinic on 11/7/23 for one week of worsening back pain. X-rays showed a T 7 compression fracture.     Now, she returns for worsening pain. She also reports right lower extremity weakness. In the ED, she is initially afebrile, though on arrival here she is found to have a low grade fever. She is not hypoxic, tachycardic or hypotensive. She has an evident, painless, easily palpated large left breast mass on exam. She has acute leukocytosis and thrombocytosis, as well as significantly elevated CRP. Procalcitonin is minimally elevated and Lactate is normal. Pro BNP is mildly elevated. She has myonecrosis from 29 to 34. An EKG was done though I can not see the results of that currently. UA was negative. X-rays of the right tibia and knee are negative for acute fractures. US of the RLE is negative for DVT. The pulsatile flow suggests right heart dysfunction. CT of chest, abdomen and pelvis reveals a left breast mass measuring 2.2 x 3.1 x 3.4 cm, suspicious for cancer, as well as a lingular pulmonary nodule suspicious for neoplasm also. MRI of the lumbar and  thoracic spines show a severe compression fracture deformity at T7 with retropulsion contributing to severe spinal canal stenosis, as well as bone marrow edema diffusely involving the T7 and T8 vertebral bodies with paraspinal soft tissue induration and ventral epidural thickening. There are also cortical irregularities noted of the right humerus.    Overall, her symptoms are consistent with acute T7 thoracic spine compression fracture, possibly pathologic vs due to unwitnessed fall, causing possible acute thoracic hematoma or discitis; in the setting of suspected undiagnosed metastatic cancer, as well as possible acute NSTEMI and/or acute right sided systolic CHF exacerbation. Fortunately without clear signs at present for acute cord compression.    I have discussed her case with her daughter Latoya by phone. I explained that Ms. Ibarra has a severe fracture of the thoracic spine, and possible concomitant spine infection or bleeding, and I offered Neurosurgery intervention, Oncology evaluation, and further cardiac testing. Both Latoya, who confirms that her mother has moderate dementia, and Ms. Ibarra herself state that she is DNR DNI, and Latoya says she is unsure in light of evident findings on CT and exam for large and likely cancerous, potentially metastatic breast mass, as well as possible concomitant NSTEMI, if any neurosurgical interventions would be within her mothers' care goals at this point. Rather, hospice enrollment may be opted for, but Latoya says she is not sure currently and wants to discuss the issue further with her brother, if possible before any surgical consultations are held. Latoya and her brother will plan to come in tomorrow morning to talk further about care goals. Overnight therefore we will focus on relief of pain and sleep as we also continue to monitor closely for any further neurologic changes or development of sepsis, at which point I will call Latoya back to discuss further overnight.      --  Inpatient. Bedrest. Fall precautions. Q 4 neuro checks. For now we plan to hold empiric antibiotics as per Neurosurgery guidance, unless she were to become septic overnight, in case surgical intervention is opted for.      -- Tylenol, Oxycodone, IV Dilaudid as needed for pain. Anti-emetics as needed. Repeat CBC, BMP, and CRP ordered for AM.    -- Her family are coming in AM to further address goals of care. Pending outcomes of these discussions, as well as overnight course, they will either opt for Neurosurgery, ID, and Oncology consultations, vs hospice enrollment.      NSTEMI and possible acute right sided systolic CHF exacerbation: As above.    -- Telemetry. Serial enzymes. Repeat EKG ordered. Further assessment with TTE and/or Cardiology consultation would be as per results of care goal discussions today.    -- Hold off on empiric IV fluid overnight    Chronic dementia: Monitor closely for sundowning while hospitalized    Hypertension: Resume home Norvasc and Lisinopril when verified    Diverticulosis, emphysema, and aortic valve calcifications: All noted incidentally on the CT scans today.    I have spent 100 minutes on the date of service doing chart review, history, examination, documentation, and further activities per the note.    Chief Complaint   Back pain    History is obtained from the patient, her daughter by phone, and the ED physician whom I have spoken with    History of Present Illness   Crystal Ibarra is a markedly pleasant 88 year old woman who presents with back pain. She has dementia and is unable to state clearly for how long she has had this pain; at one point she says it is new, at another that she has had it for several months. It is in the middle of the back and radiates to both sides, though not down either buttock or leg. She says she has noticed weakness of the right leg, without numbness there, or any waist anesthesia or bowel or bladder incontinence. It seems that her pain increased  further today, and she came to the [facility] ED for further evaluation from her [facility]. There it was noted that trying to stand exacerbated the pain. It was also noted that while laying flat for MRI scans she developed dyspnea, that seemed to resolve spontaneously. On transfer here, it is noted that she has a large, palpable mass in the left breast, which she says she has not noted before. It is not painful or weeping. Currently, she denies chest pain, weight changes, fever, chills, or sweats, or other acute complaints.    In the ED,  11/11/23 1447 (!) 184/100 98  F (36.7  C) Oral 93 16 94 %     CBC and BMP were notable for WBC 17.9, , Na 134, Cl 94, Mag 1.8, Glucose 140, otherwise were within the normal reference range. Lactate was 1.1. CRP was 325.40. Pro BNP was 2929. Troponin T levels were 29 and then 34. Procalcitonin was 0.25. UA showed 1 WBC, no RBC.     The case was discussed with Neurosurgery as well as ID in the ED, and she was given a dose of Fentanyl, and transferred here.    Recent Results (from the past 24 hour(s))   XR Tibia and Fibula Right 2 Views    Narrative    EXAM: XR TIBIA AND FIBULA RIGHT 2 VIEWS  LOCATION: [facility]  DATE: 11/11/2023    INDICATION: Dementia and pain, won't stand on leg  COMPARISON: None.      Impression    IMPRESSION:   1. Old healed proximal tibia fracture with affixing hardware.  2. Old healed impaction fracture of the lateral tibial plateau.   3. Diffuse bone demineralization and arterial calcification.  4. Chondrocalcinosis in the medial and lateral compartments of the knee.  5. No evidence of acute or subacute fracture.   XR Knee Right 3 Views    Narrative    EXAM: XR KNEE RIGHT 3 VIEWS  LOCATION: [facility]  DATE: 11/11/2023    INDICATION: Dementia and pain, won't stand on leg, please do sunrise view as well.  COMPARISON: None.      Impression    IMPRESSION: Chondrocalcinosis in all 3 compartments. Diffuse bone  demineralization and arterial calcification. Moderate effusion, nonspecific. Mild osteoarthrosis in the medial and lateral compartments. Severe osteoarthrosis in the patellofemoral   compartment. No evidence of acute or subacute fracture.   MR Thoracic Spine w/o Contrast    Addendum: 11/11/2023    Described in the FINDINGS but inadvertently omitted in the IMPRESSION of the report is serpiginous bone marrow replacement involving the proximal to mid right humerus, inadequately assessed on this exam and new since 11/05/2018, indeterminate. Correlate   with dedicated right humerus radiographs.    The remainder of the report is unchanged.      Narrative    EXAM: MR THORACIC SPINE W/O CONTRAST, MR LUMBAR SPINE W/O CONTRAST  LOCATION: [facility]  DATE: 11/11/2023    INDICATION: back pain, worsening, dementia, won't walk, elevated WBC  COMPARISON: Thoracic spine radiographs dated 11/07/2023. Humerus MRI dated 11/05/2018.  TECHNIQUE:   1) Routine Thoracic Spine MRI without IV contrast.  2) Routine Lumbar Spine MRI without IV contrast.    FINDINGS:  Prematurely terminated exam which is markedly limited exam by large field-of-view and motion artifact.    THORACIC SPINE:  S-shaped thoracolumbar scoliosis with a moderate dextroconvex curvature centered at T7 and a severe levoconvex curvature centered at L2-L3. There is 3-4 mm stepwise right lateral translation at T12-L2. Moderately exaggerated thoracic kyphosis centered at   T7 where there is a severe compression fracture demonstrating approximately 90% vertebral body height loss anteriorly and 3 mm inferior endplate retropulsion. Maintained remainder of the vertebral body heights.    Bone marrow edema diffusely involving the T7 and T8 vertebral bodies with extension into the posterior elements. Paraspinal soft tissue induration at T7-T8 with epidural thickening ventrally.    Severe spinal canal stenosis at T7 secondary to fracture retropulsion.  Multilevel spondylosis including disc bulges/protrusions, endplate osteophytic spurring, and hypertrophy of the ligamenta flava with mild multilevel degenerative spinal canal. Severe   bilateral foraminal stenosis at T7-T8 secondary to T7 compression fracture.    No abnormal spinal cord signal intensity.    Advanced degenerative change of the bilateral glenohumeral joints with subchondral cystic change. New since 2018, serpiginous bone marrow replacement involving the proximal to mid right humerus (series 2 images 8-14). A trace left pleural effusion.    LUMBAR SPINE:   Nomenclature is based on 5 lumbar type vertebral bodies.     Scoliosis, as above, with 3 mm stepwise grade 1 anterolisthesis at L3-S1. Maintained vertebral body heights. Moderate to advanced multilevel intervertebral disc height loss and desiccation.    No significant bone marrow edema within the lumbar spine. Minimally increased intra-articular fluid signal intensity involving the right L2-L5 facet joints, likely degenerative. Bone marrow edema involving the sacral alae parallel to the sacroiliac   joints.    Advanced multilevel spondylosis with severe spinal canal stenosis at L4-L5, moderate at L2-L4, and mild to moderate at T12-L2. High-grade multilevel foraminal narrowing, worst and severe at L1-L2 and L3-L5 on the right, L4-S1 on the left.    Grossly unremarkable conus medullaris terminating at L1. Crowding of the cauda equina nerve roots secondary to above-described spinal canal stenosis.      Impression    IMPRESSION:  Limited exam, as above.    THORACIC SPINE MRI:  1.  A severe compression fracture deformity at T7 with retropulsion contributing to severe spinal canal stenosis.  2.  Bone marrow edema diffusely involving the T7 and T8 vertebral bodies with paraspinal soft tissue induration and ventral epidural thickening, indeterminate due to exam limitations and absence of postcontrast images. Findings may represent reactive   bone marrow  edema and epidural/paraspinal hematoma in the setting of acute fracture. However, superimposed infection or underlying neoplastic process are not excluded. A contrast-enhanced thoracic spine MRI is recommended for better assessment.    LUMBAR SPINE MRI:  1.  No acute lumbar spine compression fracture. No focal bone marrow edema to suggest an infectious process.  2.  Findings concerning for an acute bilateral sacral insufficiency fractures.  3.  Multilevel spondylosis with severe spinal canal stenosis at L4-L5.  4.  Multilevel high-grade foraminal narrowing, as described.   MR Lumbar Spine w/o Contrast    Addendum: 11/11/2023    Described in the FINDINGS but inadvertently omitted in the IMPRESSION of the report is serpiginous bone marrow replacement involving the proximal to mid right humerus, inadequately assessed on this exam and new since 11/05/2018, indeterminate. Correlate   with dedicated right humerus radiographs.    The remainder of the report is unchanged.      Narrative    EXAM: MR THORACIC SPINE W/O CONTRAST, MR LUMBAR SPINE W/O CONTRAST  LOCATION: [facility]  DATE: 11/11/2023    INDICATION: back pain, worsening, dementia, won't walk, elevated WBC  COMPARISON: Thoracic spine radiographs dated 11/07/2023. Humerus MRI dated 11/05/2018.  TECHNIQUE:   1) Routine Thoracic Spine MRI without IV contrast.  2) Routine Lumbar Spine MRI without IV contrast.    FINDINGS:  Prematurely terminated exam which is markedly limited exam by large field-of-view and motion artifact.    THORACIC SPINE:  S-shaped thoracolumbar scoliosis with a moderate dextroconvex curvature centered at T7 and a severe levoconvex curvature centered at L2-L3. There is 3-4 mm stepwise right lateral translation at T12-L2. Moderately exaggerated thoracic kyphosis centered at   T7 where there is a severe compression fracture demonstrating approximately 90% vertebral body height loss anteriorly and 3 mm inferior endplate  retropulsion. Maintained remainder of the vertebral body heights.    Bone marrow edema diffusely involving the T7 and T8 vertebral bodies with extension into the posterior elements. Paraspinal soft tissue induration at T7-T8 with epidural thickening ventrally.    Severe spinal canal stenosis at T7 secondary to fracture retropulsion. Multilevel spondylosis including disc bulges/protrusions, endplate osteophytic spurring, and hypertrophy of the ligamenta flava with mild multilevel degenerative spinal canal. Severe   bilateral foraminal stenosis at T7-T8 secondary to T7 compression fracture.    No abnormal spinal cord signal intensity.    Advanced degenerative change of the bilateral glenohumeral joints with subchondral cystic change. New since 2018, serpiginous bone marrow replacement involving the proximal to mid right humerus (series 2 images 8-14). A trace left pleural effusion.    LUMBAR SPINE:   Nomenclature is based on 5 lumbar type vertebral bodies.     Scoliosis, as above, with 3 mm stepwise grade 1 anterolisthesis at L3-S1. Maintained vertebral body heights. Moderate to advanced multilevel intervertebral disc height loss and desiccation.    No significant bone marrow edema within the lumbar spine. Minimally increased intra-articular fluid signal intensity involving the right L2-L5 facet joints, likely degenerative. Bone marrow edema involving the sacral alae parallel to the sacroiliac   joints.    Advanced multilevel spondylosis with severe spinal canal stenosis at L4-L5, moderate at L2-L4, and mild to moderate at T12-L2. High-grade multilevel foraminal narrowing, worst and severe at L1-L2 and L3-L5 on the right, L4-S1 on the left.    Grossly unremarkable conus medullaris terminating at L1. Crowding of the cauda equina nerve roots secondary to above-described spinal canal stenosis.      Impression    IMPRESSION:  Limited exam, as above.    THORACIC SPINE MRI:  1.  A severe compression fracture deformity at T7  with retropulsion contributing to severe spinal canal stenosis.  2.  Bone marrow edema diffusely involving the T7 and T8 vertebral bodies with paraspinal soft tissue induration and ventral epidural thickening, indeterminate due to exam limitations and absence of postcontrast images. Findings may represent reactive   bone marrow edema and epidural/paraspinal hematoma in the setting of acute fracture. However, superimposed infection or underlying neoplastic process are not excluded. A contrast-enhanced thoracic spine MRI is recommended for better assessment.    LUMBAR SPINE MRI:  1.  No acute lumbar spine compression fracture. No focal bone marrow edema to suggest an infectious process.  2.  Findings concerning for an acute bilateral sacral insufficiency fractures.  3.  Multilevel spondylosis with severe spinal canal stenosis at L4-L5.  4.  Multilevel high-grade foraminal narrowing, as described.   CT Chest/Abdomen/Pelvis w Contrast    Narrative    EXAM: CT CHEST/ABDOMEN/PELVIS W CONTRAST  LOCATION: [facility]  DATE: 11/11/2023    INDICATION: elevated WBC, pain with standing, dementia, please image throught hips as well.  COMPARISON: None.  TECHNIQUE: CT scan of the chest, abdomen, and pelvis was performed following injection of IV contrast. Multiplanar reformats were obtained. Dose reduction techniques were used.   CONTRAST: 50ml [id number]    FINDINGS:   LUNGS AND PLEURA: Moderate emphysema. There is a 1.8 cm nodule within the lingula. Moderate chronic appearing atelectasis at the lung bases, right greater than left. Trace left pleural effusion. No pulmonary mass or consolidation. No pneumothorax.    MEDIASTINUM/AXILLAE: Great vessels normal in caliber. Moderate atherosclerotic calcification of the abdominal aorta. Mild aortic valve calcification. No pericardial effusion. Cardiac chambers unremarkable. No abnormally enlarged axillary, mediastinal, or   hilar lymph nodes.    CORONARY ARTERY  CALCIFICATION: Severe.    HEPATOBILIARY: Normal.    PANCREAS: Normal.    SPLEEN: Normal.    ADRENAL GLANDS: Normal.    KIDNEYS/BLADDER: Normal.    BOWEL: Moderate sigmoid diverticulosis. No evidence for diverticulitis. No free air, free fluid, or inflammatory change.    LYMPH NODES: No abnormally enlarged mesenteric, retroperitoneal, or pelvic lymph nodes.    VASCULATURE: Severe diffuse atherosclerotic calcification of the abdominal aorta and its branches. Probable at least moderate bilateral external iliac and proximal left superficial femoral artery stenosis.    PELVIC ORGANS: Prior hysterectomy     MUSCULOSKELETAL: There is a 2.2 x 3.1 x 3.4 cm left breast mass. Prior right hip replacement. There are chronic fractures of both inferior pubic rami in the left superior pubic ramus. Diffuse osteopenia. Severe multilevel degenerative change throughout   the thoracolumbar spine. Severe degenerative change in the shoulders bilaterally. Severe T7 compression deformity please see thoracic MRI report from 11/11/2023.        Impression    IMPRESSION:  1.  No acute abnormality in the chest, abdomen, or pelvis.  2.  Left breast mass measuring 2.2 x 3.1 x 3.4 cm, suspicious for cancer. Lesion would be amenable to percutaneous biopsy if needed.  3.  Lingular pulmonary nodule suspicious for neoplasm also, and may represent primary lung cancer or metastatic disease.  4.  Moderate emphysema and bibasilar atelectasis.  5.  Trace left effusion.  6.  Aortic valve calcification correlate for possible stenosis.  7.  Moderate sigmoid diverticulosis but no diverticulitis.   US Lower Extremity Venous Duplex Right    Narrative    EXAM: US LOWER EXTREMITY VENOUS DUPLEX RIGHT  LOCATION: [facility]  DATE: 11/11/2023    INDICATION: pain  COMPARISON: None.  TECHNIQUE: Venous Duplex ultrasound of the right lower extremity with and without compression, augmentation and duplex. Color flow and spectral Doppler with waveform  analysis performed.    FINDINGS: Exam includes the common femoral, femoral, popliteal, and contralateral common femoral veins as well as segmentally visualized deep calf veins and greater saphenous vein.     RIGHT: No deep vein thrombosis. No superficial thrombophlebitis.      Impression    IMPRESSION:  1.  No deep venous thrombosis in the right lower extremity.    2.  Pulsatile venous flow suggestive of right heart dysfunction.    3.  Moderate to large knee effusion.       PHYSICAL EXAM  Blood pressure (!) 163/86, pulse 52, temperature 100.2  F (37.9  C), temperature source Oral, resp. rate 16, SpO2 95%.  Constitutional: Alert and oriented to person; no apparent distress; appears very thin and frail  HEENT: normocephalic moist mucus membranes  Respiratory: lungs clear to auscultation bilaterally  Cardiovascular: regular S1 S2  GI: abdomen soft non tender non distended bowel sounds positive  Skin: large, painless mass in the left upper breast quadrant; firm; no evident right breast masses. Exam was done with the help of a present female chaperone  Musculoskeletal: no clubbing, cyanosis or edema     DVT Prophylaxis: Pneumatic Compression Devices  Code Status: DNR / DNI    Disposition: Expected discharge in several days    Jeronimo Perez MD, MD    Past Medical History    I have reviewed this patient's medical history and updated it with pertinent information if needed.   Past Medical History:   Diagnosis Date    Compression fx, thoracic spine (H) 11/07/2023    T7 compression fracture    Dementia (H)     HTN (hypertension)     Osteoporosis     Pelvic fracture (H)     Scoliosis        Past Surgical History   I have reviewed this patient's surgical history and updated it with pertinent information if needed.  Past Surgical History:   Procedure Laterality Date    APPENDECTOMY      CARPAL TUNNEL RELEASE RT/LT Right 2011    CATARACT EXTRACTION Right     HAND SURGERY Right 2018    HYSTERECTOMY      JOINT REPLACEMENT  Right 2007    Hip    JOINT REPLACEMTN, KNEE RT/LT Left     Knee    TONSILLECTOMY         Prior to Admission Medications   Prior to Admission Medications   Prescriptions Last Dose Informant Patient Reported? Taking?   MULTIVITAMIN WITH MINERALS (HAIR,SKIN AND NAILS ORAL)   Yes No   Sig: Take 1 tablet by mouth as needed   acetaminophen (TYLENOL) 325 MG tablet   Yes No   Sig: Take 650 mg by mouth every 6 hours as needed   alendronate (FOSAMAX) 70 MG tablet   Yes No   Sig: Take 70 mg by mouth every 7 days Pt states takes only on mondays   amLODIPine (NORVASC) 2.5 MG tablet   Yes No   Sig: Take 2.5 mg by mouth daily   lisinopril (PRINIVIL,ZESTRIL) 20 MG tablet   Yes No   Sig: [LISINOPRIL (PRINIVIL,ZESTRIL) 20 MG TABLET] Take 20 mg by mouth daily.      Facility-Administered Medications: None     Allergies   Allergies   Allergen Reactions    Atenolol      Other reaction(s): Sedation  Intol. Fatigue  At 50 mg or more  25 mg OK       Social History   I have reviewed this patient's social history and updated it with pertinent information if needed. Crystal Ibarra  reports that she quit smoking about 6 years ago. Her smoking use included cigarettes. She has a 7.50 pack-year smoking history. She has never used smokeless tobacco. She reports current alcohol use of about 3.0 standard drinks of alcohol per week. She reports that she does not use drugs.    Family History   Family history assessed and, except as above, is non-contributory.    Family History   Problem Relation Age of Onset    Colon Cancer Mother        Review of Systems   The 10 point Review of Systems is negative other than noted in the HPI or here.     Primary Care Physician   Arcelia Reeder [facility]    Data   Labs Ordered and Resulted from Time of ED Arrival to Time of ED Departure - No data to display    Data reviewed today:  I personally reviewed the chest CT image(s) showing left breast mass and the thoracic spine image(s) showing T7 fracture .

## 2023-11-12 NOTE — PROGRESS NOTES
Neurosurgery Treatment Plan:   Called by [facility], 88 year old female presented to ED with increasing back pain and weakness inability to walk or bear weight today. Denies changes in bowel or bladder habits. Supposedly known T7 fracture of which she has been managing pain with tylenol. Denies radicular lower extremity pain but has pain present in bilateral calves. Per ED provider unable to lift legs off of bed but has good distal lower extremity strength and when legs lifted with assist able to hold up.        Images:   Reviewed personally   THORACIC SPINE MRI:  1.  A severe compression fracture deformity at T7 with retropulsion contributing to severe spinal canal stenosis.  2.  Bone marrow edema diffusely involving the T7 and T8 vertebral bodies with paraspinal soft tissue induration and ventral epidural thickening, indeterminate due to exam limitations and absence of postcontrast images. Findings may represent reactive   bone marrow edema and epidural/paraspinal hematoma in the setting of acute fracture. However, superimposed infection or underlying neoplastic process are not excluded. A contrast-enhanced thoracic spine MRI is recommended for better assessment.     LUMBAR SPINE MRI:  1.  No acute lumbar spine compression fracture. No focal bone marrow edema to suggest an infectious process.  2.  Findings concerning for an acute bilateral sacral insufficiency fractures.  3.  Multilevel spondylosis with severe spinal canal stenosis at L4-L5.  4.  Multilevel high-grade foraminal narrowing, as described.     Plan:   Discussed with Dr. Stewart   Patient should be transferred to [facility] for possible surgical intervention to include thoracic decompression laminectomy and fusion   Will keep patient on strict bedrest        Adia Hu PA-C  [facility] Neurosurgery  O: [phone]

## 2023-11-12 NOTE — ED NOTES
Pt returns from radiology c/o sob . O2 sat 88% RA and  with freq PVC's. O2 placed at 2L NC with sats increasing to 93%. EKG obtained. Shows ST with freq PVC's. Provider aware.

## 2023-11-12 NOTE — SIGNIFICANT EVENT
Significant Event Note    Time of event: 12:54 AM November 12, 2023    Description of event:  Seen and examined, full note to follow. I have discussed her case with her daughter Latoya by phone. Both Latoya and Ms. Ibarra state that she is DNR DNI, and they are unsure in light of evident findings on CT and exam for large and likely cancerous breast mass if neurosurgical interventions would be within her care goals at this point. Rather, hospice enrollment may be opted for, but they are not sure currently. Latoya and her brother will plan to come in tomorrow to talk further about care goals. I have deferred Neurosurgery consultation as well as TTE for now.    Jeronimo Perez MD

## 2023-11-12 NOTE — MEDICATION SCRIBE - ADMISSION MEDICATION HISTORY
Medication Scribe Admission Medication History    Admission medication history is complete. The information provided in this note is only as accurate as the sources available at the time of the update.    Information Source(s): Patient, Family member, and CareEverywhere/SureScripts via in-person    Pertinent Information: pt daughter charles fam,      Changes made to PTA medication list:  Added: alendronate  Deleted: fish oil  Changed: None    Medication Affordability:       Allergies reviewed with patient and updates made in EHR: yes    Medication History Completed By: CARLOS WYLIE 11/11/2023 8:21 PM    PTA Med List   Medication Sig Last Dose    acetaminophen (TYLENOL) 325 MG tablet Take 650 mg by mouth every 6 hours as needed 11/11/2023 at am    alendronate (FOSAMAX) 70 MG tablet Take 70 mg by mouth every 7 days Pt states takes only on mondays Past Week at monday    amLODIPine (NORVASC) 2.5 MG tablet Take 2.5 mg by mouth daily 11/11/2023 at am    lisinopril (PRINIVIL,ZESTRIL) 20 MG tablet [LISINOPRIL (PRINIVIL,ZESTRIL) 20 MG TABLET] Take 20 mg by mouth daily. 11/11/2023 at am    MULTIVITAMIN WITH MINERALS (HAIR,SKIN AND NAILS ORAL) Take 1 tablet by mouth as needed Past Month at prn

## 2023-11-12 NOTE — CONSULTS
Care Management Initial Consult    General Information  Assessment completed with: Patient, Children, Family,    Type of CM/SW Visit: Initial Assessment  Pt [facility] for consideration of neurosurgery and was ultimately found to have multiple fractures, multiple masses that are likely cancerous, and acute NSTEMI and hospitalist spoke to pt and family this morning and they have chosen to forego further workup and would like comfort care and would like pt to return to [facility] on hospice.  Pt lives at [facility] in [place] [phone].  SW stopped by room and spoke to pt, daughter, son-in-law, and pt friend.  Pt strongly prefers to return to her [facility] and family is in agreement.  SW explains that it might be difficult to arrange this on a Sunday and pt says she is fine waiting until tomorrow.  DAVID explains that hospice is a covered benefit of Medicare, and that it pays for nursing care, medications, and DME but would not cover the cost of [facility] or transportation to [facility].    SW asks pt and daughter and son-in-law what questions or needs they currently have and they had some questions about pt ability to use the bathroom at [facility] now that she is far less able to walk than she was previously.  DAVID explained that we will discuss that with [facility], and that there are many alternatives that can be utilized such as pads, adult diapers, commode, etc.    Daughter was tearful throughout our conversation.  Pt was talkative and shared how much she loves coffee with cream (liquid creamer preferred) with no sugar.    Family has no preference for hospice agency, is fine going with hospice agency affiliated with [facility].  DAVID mentions that this may be [facility], as this is often the agency used by [facility], and pt [facility] is a [facility].    DAVID left voicemail for [facility] requesting a call to coordinate discharge for 11/13/23.   DAVID called [facility] answering service [phone].        Primary Care  Provider verified and updated as needed:     Readmission within the last 30 days: no previous admission in last 30 days      Reason for Consult: end of life/hospice, discharge planning  Advance Care Planning:  No documents on file        Communication Assessment  Patient's communication style: english              Cognitive  Cognitive/Neuro/Behavioral: .WDL except  Level of Consciousness: alert  Arousal Level: opens eyes spontaneously  Orientation: disoriented to, situation        Speech: clear    Living Environment:   People in home: alone     Current living Arrangements: assisted living  Name of Facility: [facility]   Able to return to prior arrangements: other (see comments)  Living Arrangement Comments: Unable to speak to staff on weekend    Family/Social Support:  Care provided by: self  Provides care for: no one  Marital Status:   Children          Description of Support System: Supportive, Involved         Current Resources:   Patient receiving home care services:  No     Community Resources: None  Equipment currently used at home:  Has a walker   Supplies currently used at home:      Employment/Financial:  -Employment Status: retired        Financial Concerns: none         Does the patient's insurance plan have a 3 day qualifying hospital stay waiver?  No    Lifestyle & Psychosocial Needs:  Social Determinants of Health     Food Insecurity: Not on file   Depression: Not on file   Housing Stability: Not on file   Tobacco Use: Medium Risk (11/12/2023)    Patient History     Smoking Tobacco Use: Former     Smokeless Tobacco Use: Never     Passive Exposure: Not on file   Financial Resource Strain: Not on file   Alcohol Use: Not on file   Transportation Needs: Not on file   Physical Activity: Not on file   Interpersonal Safety: Not on file   Stress: Not on file   Social Connections: Not on file       Functional Status:  Prior to admission patient needed assistance: Pt lived alone in her [place].  Her  daughter and son-in-law live about five minutes away and visit frequently and help out as well.          Mental Health Status:  Mental Health Status: No Current Concerns       Chemical Dependency Status:  Chemical Dependency Status: No Current Concerns             Values/Beliefs:  Spiritual, Cultural Beliefs, Worship Practices, Values that affect care: no               Additional Information:      JEFFERY Giron

## 2023-11-13 NOTE — PLAN OF CARE
Goal Outcome Evaluation:      Pt A&Ox2, disoriented to time and place. HTN, other VSS on 2L O2 via NC. Denies SOB, denies chest pain. Oxycodone given x1. Turn and repo, voids in bedpan, incontinent of urine at times, Tele NSR. Continue with plan of care.

## 2023-11-13 NOTE — PROGRESS NOTES
Care Management Follow Up    Length of Stay (days): 1    Expected Discharge Date: 11/14/2023     Concerns to be Addressed:     (Needs hospice arranged)  Patient plan of care discussed at interdisciplinary rounds: Yes    Anticipated Discharge Disposition: Assisted Living     Anticipated Discharge Services:    Anticipated Discharge DME:      Patient/family educated on Medicare website which has current facility and service quality ratings:    Education Provided on the Discharge Plan:    Patient/Family in Agreement with the Plan:      Referrals Placed by CM/SW:    Private pay costs discussed: Not applicable    Additional Information:  Writer attempted to contact the patient's [facility] [phone] and was transferred a couple time, and left a VM.    Writer sent a referral to [facility] and [facility] per Prev. SW.    Writer will speak with patient's family.     Addendum 2006:  Writer got a VM form [facility]. Writer called back [phone] and spoke with Yin to let her know what the plan and reason for there referral is. Yin stated they will look at the referral and follow up.     Writer called [facility] [phone] and spoke with Vira. Milagror gave her a quick update as to why the patient was here and Vira stated they will speak to their Nursing Director to see what their plan would be.     Writer got a call back from the patient's son in law, Rigoberto, writer gave a quick introduction and updated Rigoberto on the current information.     Writer will wait to hear back from [facility] and [facility] and will update patient's family.     Addendum 1213:   got a call from Bela over at [facility]. Bela stated they were speaking with the patient's daughter regarding the patient's current level of assist being too high for their [facility]. Bela stated that they had given options for the patient to be referred to get a PCA. Bela stated they had also had a discussion with the patient's  daughter about having the patient go to their home.    Writer met with the patient with daughter, Latoya, and son in law, Rigoberto. Writer had discussed the information above with the daughter and son in law. Patient stated they wanted to go back to the [facility]. Patient's daughter and son in law requested milagror to find an agency that offers PCA services.     Writer contacted Bela again over at [facility]. Bela stated they have worked in the past with [facility] for PCA services.      sent a referral to [facility]. Writer called them at [phone]. Milagror spoke with an Intake person who stated they do not offer PCA services.     called [facility] and spoke with María about PCA services. María stated they will forward my message to the  there.      got a call back from Meera the DAVID at [facility]. Who stated they were in conversation with the patient's son in law, Rigoberto, and providing other agencies that provide PCA. Meera also inquired about the patient's current terminal Dx since that was needed to have the patient qualify for hospice. Writer informed Meera Per Dr. Chen's note of a mass in the lung and breast. Meera stated they needed a terminal Dx. Writer stated they will speak with the hospitalist.     Writer will follow up with the patient and family.    Addendum 1302:  Writer met with the patient's family. Writer discussed current plan to send referrals to [facility], [facility], and [facility] for PCA services. Family stated they were in discussion with the staff at [facility] that they cannot increase services due to the staffing they have.     Patient's family stated they were potentially looking at the patient being placed in a LTC ( [facility] and [facility]). Milagror will send referrals there.     Family stated they were informed by Meera they would have an  within a few days.  will send a referrals to [facility] due to a rapid referral process they  have.     Writer will follow for hospice agency and PCA services.    Addendum 1504:  Writer sent a referrals through the communications to [facility] ([phone]) and to [facility] ([phone]). Writer called Artur with [facility] ( [phone]) to update and request a follow up.    Writer will follow up with [facility].              CHOCO Elkins  [facility]  Social Work

## 2023-11-13 NOTE — PROGRESS NOTES
[facility]    Medicine Progress Note - Hospitalist Service    Date of Admission:  11/11/2023  Date of Service: 11/13/2023    Assessment & Plan     Crystal Ibarra is a markedly pleasant 88 year old woman with past medical history that is most notable for chronic dementia, as well as osteoporosis, prior hip and knee replacements, and prior pelvic fractures, who presents with progressive back pain and is found to have acute T7 thoracic spine compression fracture, possible acute thoracic spine discitis, as well as suspected newly found metastatic cancer, and acute NSTEMI.     PLAN      Acute T7 thoracic spine compression fracture, possible acute thoracic spine discitis: large breast mass. Of note, Ms. Ibarra has chronic dementia. Prior notes document a history about two years ago reportedly of pelvic fractures. She initially presented to a clinic on 11/7/23 for one week of worsening back pain. X-rays showed a T 7 compression fracture.      Now, she returns for worsening pain. She also reports right lower extremity weakness. In the ED, she is initially afebrile, though on arrival here she is found to have a low grade fever. She is not hypoxic, tachycardic or hypotensive. She has an evident, painless, easily palpated large left breast mass on exam. She has acute leukocytosis and thrombocytosis, as well as significantly elevated CRP. Procalcitonin is minimally elevated and Lactate is normal. Pro BNP is mildly elevated. She has myonecrosis from 29 to 34. An EKG was done though I can not see the results of that currently. UA was negative. X-rays of the right tibia and knee are negative for acute fractures. US of the RLE is negative for DVT. The pulsatile flow suggests right heart dysfunction. CT of chest, abdomen and pelvis reveals a left breast mass measuring 2.2 x 3.1 x 3.4 cm, suspicious for cancer, as well as a lingular pulmonary nodule suspicious for neoplasm also. MRI of the lumbar and  thoracic spines show a severe compression fracture deformity at T7 with retropulsion contributing to severe spinal canal stenosis, as well as bone marrow edema diffusely involving the T7 and T8 vertebral bodies with paraspinal soft tissue induration and ventral epidural thickening. There are also cortical irregularities noted of the right humerus.     Overall, her symptoms are consistent with acute T7 thoracic spine compression fracture, possibly pathologic vs due to unwitnessed fall, causing possible acute thoracic hematoma or discitis; in the setting of suspected undiagnosed metastatic cancer, as well as possible acute NSTEMI and/or acute right sided systolic CHF exacerbation. Fortunately without clear signs at present for acute cord compression.     I have discussed her case with her daughter Latoya by phone. I explained that Ms. Ibarra has a severe fracture of the thoracic spine, and possible concomitant spine infection or bleeding, and I offered Neurosurgery intervention, Oncology evaluation, and further cardiac testing. Both Latoya, who confirms that her mother has moderate dementia, and Ms. Ibarra herself state that she is DNR DNI, and Latoya says she is unsure in light of evident findings on CT and exam for large and likely cancerous, potentially metastatic breast mass, as well as possible concomitant NSTEMI, if any neurosurgical interventions would be within her mothers' care goals at this point. Rather, hospice enrollment has been opted for.       -- NSG eval -> patient is not interested in surgical intervention or further work-up and will proceed to Hospice care.     -- Tylenol, Oxycodone, IV Dilaudid as needed for pain. Anti-emetics as needed.      NSTEMI and possible acute right sided systolic CHF exacerbation: As above.    -- now planning for hospice / comfort cares     Chronic dementia: Monitor closely for sundowning     Hypertension: stopped home Norvasc and Lisinopril    Diverticulosis, emphysema, and aortic  valve calcifications: All noted incidentally on the CT scans today.            Diet: Regular Diet Adult    DVT Prophylaxis: Pneumatic Compression Devices  Cruz Catheter: Not present  Lines: None     Cardiac Monitoring: None  Code Status: No CPR- Do NOT Intubate      Clinically Significant Risk Factors                  # Hypertension: Noted on problem list            # Financial/Environmental Concerns: none         Disposition Plan      Expected Discharge Date: 11/14/2023      Destination: assisted living  Discharge Comments: Hospice @ discharge; pending            Jonathon Weeks MD  Hospitalist Service  [facility]  Securely message with Vocera (more info)  Text page via [employer] Paging/Directory   ______________________________________________________________________    Interval History     Doing well   Pain controlled  No CP/SOB  Working on hospice placement  No nausea / vomiting or abdominal pain    Physical Exam   Vital Signs: Temp: 98  F (36.7  C) Temp src: Oral BP: 137/88 Pulse: (!) 47   Resp: 16 SpO2: 93 % O2 Device: Nasal cannula Oxygen Delivery: 3 LPM  Weight: 0 lbs 0 oz    Constitutional: Alert and oriented to person; no apparent distress  Respiratory: lungs clear to auscultation bilaterally  Cardiovascular: regular S1 S2  GI: abdomen soft non tender non distended bowel sounds positive  Musculoskeletal: no clubbing, cyanosis or edema    ----------------------------------------------------------------------------------------    Medical Decision Making       35 MINUTES SPENT BY ME on the date of service doing chart review, history, exam, documentation & further activities per the note.      Data   ------------------------- PAST 24 HR DATA REVIEWED -----------------------------------------------        Imaging results reviewed over the past 24 hrs:   No results found for this or any previous visit (from the past 24 hour(s)).  ------------------------- ENCOUNTER LABS  ----------------------------------------------------------------  Recent Labs   Lab 11/12/23  0714 11/11/23  1547   WBC 15.2* 17.9*   HGB 12.7 13.9   MCV 97 95    480*   * 134*   POTASSIUM 3.6 4.0   CHLORIDE 96* 94*   CO2 22 26   BUN 19.0 20.4   CR 0.53 0.58   ANIONGAP 16* 14   HARJINDER 9.1 10.1   GLC 99 140*       Most Recent 3 CBC's:  Recent Labs   Lab Test 11/12/23  0714 11/11/23  1547 05/19/22  0607   WBC 15.2* 17.9* 9.2   HGB 12.7 13.9 12.0   MCV 97 95 93    480* 392     Most Recent 3 BMP's:  Recent Labs   Lab Test 11/12/23  0714 11/11/23  1547 06/13/22  0653   * 134* 136   POTASSIUM 3.6 4.0 3.9   CHLORIDE 96* 94* 102   CO2 22 26 21*   BUN 19.0 20.4 18   CR 0.53 0.58 0.62   ANIONGAP 16* 14 13   HARJINDER 9.1 10.1 9.3   GLC 99 140* 82     Most Recent 2 LFT's:  Recent Labs   Lab Test 02/23/18  0930 02/21/18 2050   AST 40 48*   ALT 43 61*   ALKPHOS 51 64   BILITOTAL 0.5 0.4     Most Recent 3 INR's:  Recent Labs   Lab Test 02/21/18 2050   INR 1.03     Most Recent 3 Troponin's:No lab results found.  Most Recent 3 BNP's:  Recent Labs   Lab Test 11/11/23  1547   NTBNPI 2,929*     Most Recent D-dimer:  Recent Labs   Lab Test 05/14/22  1429   DD 1.79*     Most Recent Cholesterol Panel:No lab results found.  Most Recent 6 Bacteria Isolates From Any Culture (See EPIC Reports for Culture Details):No lab results found.  Most Recent Urinalysis:  Recent Labs   Lab Test 11/11/23  1641   COLOR Yellow   APPEARANCE Clear   URINEGLC Negative   URINEBILI Negative   URINEKETONE Trace*   SG 1.026   UBLD 0.06 mg/dL*   URINEPH 5.5   PROTEIN 70*   NITRITE Negative   LEUKEST Negative   RBCU <1   WBCU 1      negative

## 2023-11-13 NOTE — PROGRESS NOTES
A&Ox3, disoriented, and need some reminders.Uses call light some times.Vss on 2L.Tele NSR. IV SL.Tolerated regular diet.Straight cath in the morning out put of 700.Pt uses bed pan, and requested no catheter for her. No PRN during this shift. Plan for Hospice care.

## 2023-11-13 NOTE — PROGRESS NOTES
11/13/23; 1109-5012    COMFORT CARES    A&O X2-3 ( situation; time); bedrest w/ bedpan and check and change; pt is comfortable / family at bedside; pain managed with PRN Tylenol and Oxycodone; plan for hospice service w/ SW assistance.

## 2023-11-14 NOTE — PROGRESS NOTES
AO x2, disoriented to time/place. Bedrest maintained. Comfort care provided. Pt slept majority of shift. VSS, on 2L O2. PIV SL. Pain managed with Oxy x1. Reg diet.

## 2023-11-14 NOTE — PROGRESS NOTES
11/14/23; 8065-1193    A&O X2-3 ( situation and time); bedrest and uses the bedpan; patient is comfortable with family at bedside; pain managed with rest and coffee; SW to assist with hospice placement.

## 2023-11-14 NOTE — PROGRESS NOTES
[facility]    Medicine Progress Note - Hospitalist Service    Date of Admission:  11/11/2023  Date of Service: 11/14/2023    Assessment & Plan     Crystal Ibarra is a markedly pleasant 88 year old woman with past medical history that is most notable for chronic dementia, as well as osteoporosis, prior hip and knee replacements, and prior pelvic fractures, who presents with progressive back pain and is found to have acute T7 thoracic spine compression fracture, possible acute thoracic spine discitis, as well as suspected newly found metastatic cancer, and acute NSTEMI.     PLAN      Acute T7 thoracic spine compression fracture, possible acute thoracic spine discitis: large breast mass  Spinal cord edema   Assessment: Of note, Ms. Ibarra has chronic dementia. Prior notes document a history about two years ago reportedly of pelvic fractures. She initially presented to a clinic on 11/7/23 for one week of worsening back pain. X-rays showed a T 7 compression fracture.      Now, she returns for worsening pain. She also reports right lower extremity weakness. In the ED, she is initially afebrile, though on arrival here she is found to have a low grade fever. She is not hypoxic, tachycardic or hypotensive. She has an evident, painless, easily palpated large left breast mass on exam. She has acute leukocytosis and thrombocytosis, as well as significantly elevated CRP. Procalcitonin is minimally elevated and Lactate is normal. Pro BNP is mildly elevated. She has myonecrosis from 29 to 34. An EKG was done though I can not see the results of that currently. UA was negative. X-rays of the right tibia and knee are negative for acute fractures. US of the RLE is negative for DVT. The pulsatile flow suggests right heart dysfunction. CT of chest, abdomen and pelvis reveals a left breast mass measuring 2.2 x 3.1 x 3.4 cm, suspicious for cancer, as well as a lingular pulmonary nodule suspicious for neoplasm  also. MRI of the lumbar and thoracic spines show a severe compression fracture deformity at T7 with retropulsion contributing to severe spinal canal stenosis, as well as bone marrow edema diffusely involving the T7 and T8 vertebral bodies with paraspinal soft tissue induration and ventral epidural thickening. There are also cortical irregularities noted of the right humerus.     Overall, her symptoms are consistent with acute T7 thoracic spine compression fracture, possibly pathologic vs due to unwitnessed fall, causing possible acute thoracic hematoma or discitis; in the setting of suspected undiagnosed metastatic cancer, as well as possible acute NSTEMI and/or acute right sided systolic CHF exacerbation. Fortunately without clear signs at present for acute cord compression.     I have discussed her case with her daughter Latoya by phone. I explained that Ms. Ibarra has a severe fracture of the thoracic spine, and possible concomitant spine infection or bleeding, and I offered Neurosurgery intervention, Oncology evaluation, and further cardiac testing. Both Latoya, who confirms that her mother has moderate dementia, and Ms. Ibarra herself state that she is DNR DNI, and Latoya says she is unsure in light of evident findings on CT and exam for large and likely cancerous, potentially metastatic breast mass, as well as possible concomitant NSTEMI, if any neurosurgical interventions would be within her mothers' care goals at this point. Rather, hospice enrollment has been opted for.       -- NSG eval -> patient is not interested in surgical intervention or further work-up and will proceed to Hospice care.     -- Tylenol, Oxycodone, IV Dilaudid as needed for pain. Anti-emetics as needed.    -- SW consulted      NSTEMI and possible acute right sided systolic CHF exacerbation: As above.    -- now planning for hospice / comfort cares     Chronic dementia: Monitor closely for sundowning     Hypertension: stopped home Norvasc and  Lisinopril    Diverticulosis, emphysema, and aortic valve calcifications: All noted incidentally on the CT scans today.            Diet: Regular Diet Adult    DVT Prophylaxis: Pneumatic Compression Devices  Cruz Catheter: Not present  Lines: None     Cardiac Monitoring: None  Code Status: No CPR- Do NOT Intubate      Clinically Significant Risk Factors                  # Hypertension: Noted on problem list            # Financial/Environmental Concerns: none         Disposition Plan      Expected Discharge Date: 11/16/2023      Destination: assisted living  Discharge Comments: Hospice facility @d/c            Jonathon Weeks MD  Hospitalist Service  [facility]  Securely message with CRMnext (more info)  Text page via [employer] Paging/Directory   ______________________________________________________________________    Interval History     No acute events overnight  Doing well   Pain controlled  No CP/SOB  Working on hospice placement  No nausea / vomiting or abdominal pain    Physical Exam   Vital Signs:     BP: 135/86 Pulse: 88     SpO2: 90 % O2 Device: Nasal cannula Oxygen Delivery: 2 LPM  Weight: 0 lbs 0 oz    Constitutional: Alert and oriented to person; no apparent distress  Respiratory: lungs clear to auscultation bilaterally  Cardiovascular: regular S1 S2  GI: abdomen soft non tender non distended bowel sounds positive  Musculoskeletal: no clubbing, cyanosis or edema    ----------------------------------------------------------------------------------------    Medical Decision Making       35 MINUTES SPENT BY ME on the date of service doing chart review, history, exam, documentation & further activities per the note.      Data   ------------------------- PAST 24 HR DATA REVIEWED -----------------------------------------------        Imaging results reviewed over the past 24 hrs:   No results found for this or any previous visit (from the past 24 hour(s)).  ------------------------- ENCOUNTER  LABS ----------------------------------------------------------------  Recent Labs   Lab 11/12/23  0714 11/11/23  1547   WBC 15.2* 17.9*   HGB 12.7 13.9   MCV 97 95    480*   * 134*   POTASSIUM 3.6 4.0   CHLORIDE 96* 94*   CO2 22 26   BUN 19.0 20.4   CR 0.53 0.58   ANIONGAP 16* 14   HARJINDER 9.1 10.1   GLC 99 140*       Most Recent 3 CBC's:  Recent Labs   Lab Test 11/12/23  0714 11/11/23  1547 05/19/22  0607   WBC 15.2* 17.9* 9.2   HGB 12.7 13.9 12.0   MCV 97 95 93    480* 392     Most Recent 3 BMP's:  Recent Labs   Lab Test 11/12/23  0714 11/11/23  1547 06/13/22  0653   * 134* 136   POTASSIUM 3.6 4.0 3.9   CHLORIDE 96* 94* 102   CO2 22 26 21*   BUN 19.0 20.4 18   CR 0.53 0.58 0.62   ANIONGAP 16* 14 13   HARJINDER 9.1 10.1 9.3   GLC 99 140* 82     Most Recent 2 LFT's:  Recent Labs   Lab Test 02/23/18  0930 02/21/18 2050   AST 40 48*   ALT 43 61*   ALKPHOS 51 64   BILITOTAL 0.5 0.4     Most Recent 3 INR's:  Recent Labs   Lab Test 02/21/18 2050   INR 1.03     Most Recent 3 Troponin's:No lab results found.  Most Recent 3 BNP's:  Recent Labs   Lab Test 11/11/23  1547   NTBNPI 2,929*     Most Recent D-dimer:  Recent Labs   Lab Test 05/14/22  1429   DD 1.79*     Most Recent Cholesterol Panel:No lab results found.  Most Recent 6 Bacteria Isolates From Any Culture (See EPIC Reports for Culture Details):No lab results found.  Most Recent Urinalysis:  Recent Labs   Lab Test 11/11/23  1641   COLOR Yellow   APPEARANCE Clear   URINEGLC Negative   URINEBILI Negative   URINEKETONE Trace*   SG 1.026   UBLD 0.06 mg/dL*   URINEPH 5.5   PROTEIN 70*   NITRITE Negative   LEUKEST Negative   RBCU <1   WBCU 1

## 2023-11-14 NOTE — PROGRESS NOTES
Care Management Follow Up    Length of Stay (days): 2    Expected Discharge Date: 11/16/2023     Concerns to be Addressed:     (Needs hospice arranged)  Patient plan of care discussed at interdisciplinary rounds: Yes    Anticipated Discharge Disposition: Assisted Living     Anticipated Discharge Services:    Anticipated Discharge DME:      Patient/family educated on Medicare website which has current facility and service quality ratings:    Education Provided on the Discharge Plan:    Patient/Family in Agreement with the Plan:      Referrals Placed by CM/SW:    Private pay costs discussed: insurance costs out of pocket expenses    Additional Information:  Writer got a VM in the AM from the patient's son in law Rigoberto asking for an update.     Writer got a VM from [employer] wanting to discuss the patient.     Writer called Bela with [facility] [phone] and left a VM.    Writer called Dorene with [facility] [phone]. Dorene stated she was going to do the intake to discuss the PCA services with the patient's daughter soon. Dorene transferred writer to hospice intake who stated they will follow up with the daughter as well. Both will follow up with writer afterwards.     Writer called the patient's son in law, Rigoberto to update.     Writer updated Dr. Stinson.     Addendum 1620:  Writer spoke with the patient son In law. Writer was updated they got a call from Dorene at [facility] they would be able to come today at [phone] to complete an assessment.     Writer got a call from Ana Maria at [facility] stating they can accept the patient into a shared room for LTC. Ana Maria stated it would be 5K down payment and 350-550 a day cost.     Writer updated the patient's daughter about the information.     Writer will continue to plan to have the [facility] and PCA services to [facility] tomorrow pending [facility] assessment.             CHOCO Elkins  [facility]  Hospital  Social Work

## 2023-11-15 NOTE — PROGRESS NOTES
Care Management Follow Up    Length of Stay (days): 3    Expected Discharge Date: 11/16/2023     Concerns to be Addressed:     (Needs hospice arranged)  Patient plan of care discussed at interdisciplinary rounds: Yes    Anticipated Discharge Disposition: Assisted Living     Anticipated Discharge Services:    Anticipated Discharge DME:      Patient/family educated on Medicare website which has current facility and service quality ratings:    Education Provided on the Discharge Plan:    Patient/Family in Agreement with the Plan:      Referrals Placed by CM/SW:    Private pay costs discussed: Not applicable    Additional Information:   called Dorene with [employer], [phone], Dorene stated the  came by yesterday and spoke with the patient's daughter and son-in-law.  Dorene stated that the family had some concerns about the cost for privately paying for the PCA and were in discussion of whether having to move the patient to a long-term care facility or back home.     Writer sent additional referrals to long-term care facilities around the [facility].     Writer will discuss with the patient's daughter and son-in-law in order to finalize a plan of whether the patient would go back to the assisted living or go home with them, depending on cost for them.    Addendum 1606:  Milagror got a VM from SHELBY at [facility] who stated they have a 4-6 month long waitlist for LTC.      Writer met with the patient and family at bedside. Writer discussed need to move forward with a plan. Writer updated the family on the information above regarding the waitlist. Family stated they were waiting on a brother to finalize a plan. Family stated they were planning on moving forward with [facility], but unsure about the bed type. Writer stated if they were unsure, they could get in contact with the  with [facility] to facilitate a transition from [facility] back home with them. Family stated the appreciated  the information and are leaning on that plan.     Family and writer will finalize a plan tomorrow with an aim to transition the patient to [facility] on hospice.         CHOCO Elkins  [facility]  Social Work

## 2023-11-15 NOTE — PROGRESS NOTES
AO x2. Pain managed with Oxy 5 mg x1. On 2L O2 via NC. Fecal and urine incontinence x1. Bedrest. Pt slept entire night. Comfort care provided.

## 2023-11-15 NOTE — PROGRESS NOTES
[facility]    Medicine Progress Note - Hospitalist Service    Date of Admission:  11/11/2023  Date of Service: 11/15/2023    Assessment & Plan     Crystal Ibarra is a markedly pleasant 88 year old woman with past medical history that is most notable for chronic dementia, as well as osteoporosis, prior hip and knee replacements, and prior pelvic fractures, who presents with progressive back pain and is found to have acute T7 thoracic spine compression fracture, possible acute thoracic spine discitis, as well as suspected newly found metastatic cancer, and acute NSTEMI.     PLAN      Acute T7 thoracic spine compression fracture, possible acute thoracic spine discitis: large breast mass  Spinal cord edema   Assessment: Of note, Ms. Ibarra has chronic dementia. Prior notes document a history about two years ago reportedly of pelvic fractures. She initially presented to a clinic on 11/7/23 for one week of worsening back pain. X-rays showed a T 7 compression fracture.      Now, she returns for worsening pain. She also reports right lower extremity weakness. In the ED, she is initially afebrile, though on arrival here she is found to have a low grade fever. She is not hypoxic, tachycardic or hypotensive. She has an evident, painless, easily palpated large left breast mass on exam. She has acute leukocytosis and thrombocytosis, as well as significantly elevated CRP. Procalcitonin is minimally elevated and Lactate is normal. Pro BNP is mildly elevated. She has myonecrosis from 29 to 34. An EKG was done though I can not see the results of that currently. UA was negative. X-rays of the right tibia and knee are negative for acute fractures. US of the RLE is negative for DVT. The pulsatile flow suggests right heart dysfunction. CT of chest, abdomen and pelvis reveals a left breast mass measuring 2.2 x 3.1 x 3.4 cm, suspicious for cancer, as well as a lingular pulmonary nodule suspicious for neoplasm  also. MRI of the lumbar and thoracic spines show a severe compression fracture deformity at T7 with retropulsion contributing to severe spinal canal stenosis, as well as bone marrow edema diffusely involving the T7 and T8 vertebral bodies with paraspinal soft tissue induration and ventral epidural thickening. There are also cortical irregularities noted of the right humerus.     Overall, her symptoms are consistent with acute T7 thoracic spine compression fracture, possibly pathologic vs due to unwitnessed fall, causing possible acute thoracic hematoma or discitis; in the setting of suspected undiagnosed metastatic cancer, as well as possible acute NSTEMI and/or acute right sided systolic CHF exacerbation. Fortunately without clear signs at present for acute cord compression.     I have discussed her case with her daughter Latoya by phone. I explained that Ms. Ibarra has a severe fracture of the thoracic spine, and possible concomitant spine infection or bleeding, and I offered Neurosurgery intervention, Oncology evaluation, and further cardiac testing. Both Latoya, who confirms that her mother has moderate dementia, and Ms. Ibarra herself state that she is DNR DNI, and Latoya says she is unsure in light of evident findings on CT and exam for large and likely cancerous, potentially metastatic breast mass, as well as possible concomitant NSTEMI, if any neurosurgical interventions would be within her mothers' care goals at this point. Rather, hospice enrollment has been opted for.       -- NSG eval -> patient is not interested in surgical intervention or further work-up and will proceed to Hospice care.     -- Tylenol, Oxycodone, IV Dilaudid as needed for pain. Anti-emetics as needed.    -- SW consulted      NSTEMI and possible acute right sided systolic CHF exacerbation: As above.    -- now planning for hospice / comfort cares     Chronic dementia: Monitor closely for sundowning     Hypertension: stopped home Norvasc and  Lisinopril    Diverticulosis, emphysema, and aortic valve calcifications: All noted incidentally on the CT scans today.            Diet: Regular Diet Adult    DVT Prophylaxis: Pneumatic Compression Devices  Cruz Catheter: Not present  Lines: None     Cardiac Monitoring: None  Code Status: No CPR- Do NOT Intubate      Clinically Significant Risk Factors                  # Hypertension: Noted on problem list            # Financial/Environmental Concerns: none         Disposition Plan      Expected Discharge Date: 11/16/2023      Destination: assisted living  Discharge Comments: Hospice facility @d/c            Jonathon Weeks MD  Hospitalist Service  [facility]  Securely message with [employer] (more info)  Text page via [employer] Paging/Directory   ______________________________________________________________________    Interval History     No acute events overnight  Doing well   Pain controlled  No CP/SOB  Working on hospice placement with family  No nausea / vomiting or abdominal pain    Physical Exam   Vital Signs:     BP: 107/62 Pulse: 80     SpO2: 91 % O2 Device: Nasal cannula Oxygen Delivery: 2 LPM  Weight: 0 lbs 0 oz    Constitutional: Alert and oriented to person; no apparent distress  Respiratory: no resp distress  NEURO: A/Ox3. Sharron.    ----------------------------------------------------------------------------------------    Medical Decision Making       25 MINUTES SPENT BY ME on the date of service doing chart review, history, exam, documentation & further activities per the note.      Data   ------------------------- PAST 24 HR DATA REVIEWED -----------------------------------------------        Imaging results reviewed over the past 24 hrs:   No results found for this or any previous visit (from the past 24 hour(s)).  ------------------------- ENCOUNTER LABS ----------------------------------------------------------------  Recent Dyyno   Lab 11/12/23  0714 11/11/23  1547   WBC 15.2*  17.9*   HGB 12.7 13.9   MCV 97 95    480*   * 134*   POTASSIUM 3.6 4.0   CHLORIDE 96* 94*   CO2 22 26   BUN 19.0 20.4   CR 0.53 0.58   ANIONGAP 16* 14   HARJINDER 9.1 10.1   GLC 99 140*       Most Recent 3 CBC's:  Recent Labs   Lab Test 11/12/23  0714 11/11/23  1547 05/19/22  0607   WBC 15.2* 17.9* 9.2   HGB 12.7 13.9 12.0   MCV 97 95 93    480* 392     Most Recent 3 BMP's:  Recent Labs   Lab Test 11/12/23  0714 11/11/23  1547 06/13/22  0653   * 134* 136   POTASSIUM 3.6 4.0 3.9   CHLORIDE 96* 94* 102   CO2 22 26 21*   BUN 19.0 20.4 18   CR 0.53 0.58 0.62   ANIONGAP 16* 14 13   HARJINDER 9.1 10.1 9.3   GLC 99 140* 82     Most Recent 2 LFT's:  Recent Labs   Lab Test 02/23/18  0930 02/21/18 2050   AST 40 48*   ALT 43 61*   ALKPHOS 51 64   BILITOTAL 0.5 0.4     Most Recent 3 INR's:  Recent Labs   Lab Test 02/21/18 2050   INR 1.03     Most Recent 3 Troponin's:No lab results found.  Most Recent 3 BNP's:  Recent Labs   Lab Test 11/11/23  1547   NTBNPI 2,929*     Most Recent D-dimer:  Recent Labs   Lab Test 05/14/22  1429   DD 1.79*     Most Recent Cholesterol Panel:No lab results found.  Most Recent 6 Bacteria Isolates From Any Culture (See EPIC Reports for Culture Details):No lab results found.  Most Recent Urinalysis:  Recent Labs   Lab Test 11/11/23  1641   COLOR Yellow   APPEARANCE Clear   URINEGLC Negative   URINEBILI Negative   URINEKETONE Trace*   SG 1.026   UBLD 0.06 mg/dL*   URINEPH 5.5   PROTEIN 70*   NITRITE Negative   LEUKEST Negative   RBCU <1   WBCU 1

## 2023-11-15 NOTE — PROGRESS NOTES
AxOx2-3. Bedrest. Turn and repo q 2 hours. Bedpan. Patient received 5mg Oxycodone for pain during shift.Hospice care pending. Resting in bed.

## 2023-11-15 NOTE — PROGRESS NOTES
11/15/23; [id number]    A&Ox2-3 ( situation and time); bedrest on COMFORT cares; incontinent of BB ( no BM this shift); pain managed with repositioning, PRN Tylenol and Oxy; plan for hospice care at a LTC, w/ assistance from DAVID.

## 2023-11-16 NOTE — PLAN OF CARE
Reason for Admission: Acute T7 thoracic spine compression fx    Cognitive/Mentation: A/Ox 2; disoriented to situation and time     VS: n/a; 02 at 2L for comfort.     GI: incontinent. No BM this shift.    : incontinent.    Pulmonary: SOB, orthopnea    Pain: 8/10 to back - alleviated by oxy X2, Tylenol X 2, and repositioning.    Drains/Lines: No IV access    Skin: turn and repo every 2 hours. Frequent oral cares.    Activity: Strict bedrest. Assist x 2.    Diet: regular with thin liquids. Takes pills whole. Poor oral intake this shift.    Therapies recs: n/a    Discharge: possible discharge 11/17/23    Aggression Stoplight Tool: green    End of shift summary: On COMFORT cares.

## 2023-11-16 NOTE — PROGRESS NOTES
Care Management Follow Up    Length of Stay (days): 4    Expected Discharge Date: 11/17/2023     Concerns to be Addressed:     (Needs hospice arranged)  Patient plan of care discussed at interdisciplinary rounds: Yes    Anticipated Discharge Disposition: Assisted Living     Anticipated Discharge Services:    Anticipated Discharge DME:      Patient/family educated on Medicare website which has current facility and service quality ratings:    Education Provided on the Discharge Plan:    Patient/Family in Agreement with the Plan:      Referrals Placed by CM/SW:    Private pay costs discussed: Not applicable    Additional Information:  Writer called Ana Maria over at [facility] but left a VM for a call back to discuss what Is needed to be sent for the patient to be admitted there.     Writer awaiting call back .      Addendum 1042:  Writer got a call back from Ana Maria who stated they can accept the patient and all they would need is a PAS, orders, and script. Writer clarified that this was for LTC. Ana Maria stated it is, but that this was the only paperwork that was needed.     Writer called the patient's son in law, Rigoberto [phone] to update and left a VM.    Addendum 1230:    Writer got a VM from the patient's son who asked the writer to call [facility]     Writer called [facility] and spoke with Kermit [phone]. Kermit stated they would need medical records but can accept H&P and nursing notes. Writer stated he would send those to their fax #: [phone]. Kermit stated they would also have a nurse come out to do an assessment by the earliest Monday.     Writer updated the patient's family at bedside. Patient's family stated they would like to wait for the [facility] facility. Writer stated that it was a little far out, but would inquire with the doctor to see if the patient was medically ready, but if she was they would have to move forward with the [facility].    Writer paged Dr. Stinson who  confirmed the patient was medically ready and will speak with the family. Writer updated Dr. Weeks that the patient would need to go with a three day supply of meds and will finalize the transition to [facility].     Writer will contact [facility] to schedule an admit time over there.       Addendum 1451:  Writer got in contact with Ana Maria at [facility] who updated the writer the bed would be avalable tomorrow.    Writer sent over information over to [facility] (H&P, hospitalist notes, and recent RN notes) over to [phone]. Writer called Lalita to see if they can expedite the patient's assessment.     Writer updated Dr. Weeks and patient family.     Writer got a VM from [facility] wanting an update. Writer will call them back.     Addendum 1532:  Writer got a call from Ana Maria over at [facility] who asked the writer to set up a transportation ride and send orders over since she wont be in tomorrow.     Writer messaged Dr. Weeks to submit the orders.     Writer spoke with bedside rn who stated the patient would not be fit for a wheelchair as she is in pain when moved.     Writer got a call from Lalita at [facility] who updated the writer that they wont be able to get an  until Monday. Writer inquired if they would be able to accept the patient if going to another facility.  Kermit stated they would just need to be updated where the patient is.     Writer called [facility] and set up a stretcher ride with Jose Francisco for tomorrow between 2028-1302.    Writer called Ana Maria in [facility] to update on transport time.     Writer called Patric at [facility] [phone] and updated him on the discharge plan and that the patient would only need hopsice now. Jeremie asked for orders to be sent to [phone].    Writer will update patient's family.     Addendum 8654:  Writer completed PAS.  PAS-RR    D: Per DHS regulation, DAVID completed and submitted PAS-RR to [place] [employer] via the [employer].  PAS-RR  confirmation # is : [id number]    I: SW spoke with patient and they are aware a PAS-RR has been submitted.  SW reviewed with patient that they may be contacted for a follow up appointment within 10 days of hospital discharge if their SNF stay is < 30 days.  Contact information for [employer] was also provided.    A: patient verbalized understanding.    P: Further questions may be directed to [employer] at #[phone], option #4 for PAS-RR staff.  Writer Filled out PCS form and faxed it to [phone]    CHOCO Elkins  [facility]  Social Work

## 2023-11-16 NOTE — PLAN OF CARE
Goal Outcome Evaluation:    Patient repositioned in bed. Pain managed with PO meds. Minimal appetite at dinner.

## 2023-11-16 NOTE — PLAN OF CARE
Goal Outcome Evaluation:         Pt A&Ox2 this shift, slept through most of the night. Repositioned for comfort. Pain managed with prn oxy & tylenol. Fecal & urine incontinence x1 overnight.

## 2023-11-16 NOTE — PROGRESS NOTES
[facility]    Medicine Progress Note - Hospitalist Service    Date of Admission:  11/11/2023  Date of Service: 11/16/2023    Assessment & Plan     Crystal Ibarra is a markedly pleasant 88 year old woman with past medical history that is most notable for chronic dementia, as well as osteoporosis, prior hip and knee replacements, and prior pelvic fractures, who presents with progressive back pain and is found to have acute T7 thoracic spine compression fracture, possible acute thoracic spine discitis, as well as suspected newly found metastatic cancer, and acute NSTEMI.     PLAN      Acute T7 thoracic spine compression fracture, possible acute thoracic spine discitis: large breast mass  Spinal cord edema   Assessment: Of note, Ms. Ibarra has chronic dementia. Prior notes document a history about two years ago reportedly of pelvic fractures. She initially presented to a clinic on 11/7/23 for one week of worsening back pain. X-rays showed a T 7 compression fracture.      Now, she returns for worsening pain. She also reports right lower extremity weakness. In the ED, she is initially afebrile, though on arrival here she is found to have a low grade fever. She is not hypoxic, tachycardic or hypotensive. She has an evident, painless, easily palpated large left breast mass on exam. She has acute leukocytosis and thrombocytosis, as well as significantly elevated CRP. Procalcitonin is minimally elevated and Lactate is normal. Pro BNP is mildly elevated. She has myonecrosis from 29 to 34. An EKG was done though I can not see the results of that currently. UA was negative. X-rays of the right tibia and knee are negative for acute fractures. US of the RLE is negative for DVT. The pulsatile flow suggests right heart dysfunction. CT of chest, abdomen and pelvis reveals a left breast mass measuring 2.2 x 3.1 x 3.4 cm, suspicious for cancer, as well as a lingular pulmonary nodule suspicious for neoplasm  also. MRI of the lumbar and thoracic spines show a severe compression fracture deformity at T7 with retropulsion contributing to severe spinal canal stenosis, as well as bone marrow edema diffusely involving the T7 and T8 vertebral bodies with paraspinal soft tissue induration and ventral epidural thickening. There are also cortical irregularities noted of the right humerus.     Overall, her symptoms are consistent with acute T7 thoracic spine compression fracture, possibly pathologic vs due to unwitnessed fall, causing possible acute thoracic hematoma or discitis; in the setting of suspected undiagnosed metastatic cancer, as well as possible acute NSTEMI and/or acute right sided systolic CHF exacerbation. Fortunately without clear signs at present for acute cord compression.     I have discussed her case with her daughter Latoya by phone. I explained that Ms. Ibarra has a severe fracture of the thoracic spine, and possible concomitant spine infection or bleeding, and I offered Neurosurgery intervention, Oncology evaluation, and further cardiac testing. Both Latoya, who confirms that her mother has moderate dementia, and Ms. Ibarra herself state that she is DNR DNI, and Latoya says she is unsure in light of evident findings on CT and exam for large and likely cancerous, potentially metastatic breast mass, as well as possible concomitant NSTEMI, if any neurosurgical interventions would be within her mothers' care goals at this point. Rather, hospice enrollment has been opted for.       -- NSG eval -> patient is not interested in surgical intervention or further work-up and will proceed to Hospice care.     -- Tylenol, Oxycodone, IV Dilaudid as needed for pain. Anti-emetics as needed.    -- SW consulted -> to move forward with the [facility] today vs tomorrow      NSTEMI and possible acute right sided systolic CHF exacerbation: As above.    -- now planning for hospice / comfort cares     Chronic dementia: Monitor closely for  sundowning     Hypertension: stopped home Norvasc and Lisinopril    Diverticulosis, emphysema, and aortic valve calcifications: All noted incidentally on the CT scans today.            Diet: Regular Diet Adult    DVT Prophylaxis: Pneumatic Compression Devices  Cruz Catheter: Not present  Lines: None     Cardiac Monitoring: None  Code Status: No CPR- Do NOT Intubate      Clinically Significant Risk Factors                  # Hypertension: Noted on problem list            # Financial/Environmental Concerns: none         Disposition Plan      Expected Discharge Date: 11/17/2023      Destination: assisted living  Discharge Comments: Hospice facility @d/c            Jonathon Weeks MD  Hospitalist Service  [facility]  Securely message with [employer] (more info)  Text page via [employer] Paging/Directory   ______________________________________________________________________    Interval History     No acute events overnight  Family at bedside  Working on hospice placement with family -> plan to move forward with the [facility] tomorrow vs today    Physical Exam   Vital Signs:                    Weight: 0 lbs 0 oz    Constitutional: Alert and oriented to person; no apparent distress  Respiratory: no resp distress  NEURO: A/Ox3. Sharron.    ----------------------------------------------------------------------------------------    Medical Decision Making       25 MINUTES SPENT BY ME on the date of service doing chart review, history, exam, documentation & further activities per the note.      Data   ------------------------- PAST 24 HR DATA REVIEWED -----------------------------------------------        Imaging results reviewed over the past 24 hrs:   No results found for this or any previous visit (from the past 24 hour(s)).  ------------------------- ENCOUNTER LABS ----------------------------------------------------------------  Recent Labs   Lab 11/12/23  0714 11/11/23  1547   WBC 15.2* 17.9*   HGB 12.7  13.9   MCV 97 95    480*   * 134*   POTASSIUM 3.6 4.0   CHLORIDE 96* 94*   CO2 22 26   BUN 19.0 20.4   CR 0.53 0.58   ANIONGAP 16* 14   HARJINDER 9.1 10.1   GLC 99 140*       Most Recent 3 CBC's:  Recent Labs   Lab Test 11/12/23  0714 11/11/23  1547 05/19/22  0607   WBC 15.2* 17.9* 9.2   HGB 12.7 13.9 12.0   MCV 97 95 93    480* 392     Most Recent 3 BMP's:  Recent Labs   Lab Test 11/12/23  0714 11/11/23  1547 06/13/22  0653   * 134* 136   POTASSIUM 3.6 4.0 3.9   CHLORIDE 96* 94* 102   CO2 22 26 21*   BUN 19.0 20.4 18   CR 0.53 0.58 0.62   ANIONGAP 16* 14 13   HARJINDER 9.1 10.1 9.3   GLC 99 140* 82     Most Recent 2 LFT's:  Recent Labs   Lab Test 02/23/18  0930 02/21/18 2050   AST 40 48*   ALT 43 61*   ALKPHOS 51 64   BILITOTAL 0.5 0.4     Most Recent 3 INR's:  Recent Labs   Lab Test 02/21/18 2050   INR 1.03     Most Recent 3 Troponin's:No lab results found.  Most Recent 3 BNP's:  Recent Labs   Lab Test 11/11/23  1547   NTBNPI 2,929*     Most Recent D-dimer:  Recent Labs   Lab Test 05/14/22  1429   DD 1.79*     Most Recent Cholesterol Panel:No lab results found.  Most Recent 6 Bacteria Isolates From Any Culture (See EPIC Reports for Culture Details):No lab results found.  Most Recent Urinalysis:  Recent Labs   Lab Test 11/11/23  1641   COLOR Yellow   APPEARANCE Clear   URINEGLC Negative   URINEBILI Negative   URINEKETONE Trace*   SG 1.026   UBLD 0.06 mg/dL*   URINEPH 5.5   PROTEIN 70*   NITRITE Negative   LEUKEST Negative   RBCU <1   WBCU 1

## 2023-11-17 NOTE — DISCHARGE SUMMARY
[facility]    Discharge Summary  Hospitalist    Date of Admission:  11/11/2023  Date of Discharge:  11/17/2023  Discharging Provider: Ne Lao MD    Discharge Diagnoses   Comfort measures status    History of Present Illness   Please review admission history and physical.    Hospital Course   Crystal Ibarra was admitted on 11/11/2023.  The following problems were addressed during her hospitalization:    Principal Problem:    Compression fracture of body of thoracic vertebra (H)  Crystal Ibarra is a markedly pleasant 88 year old woman with past medical history that is most notable for chronic dementia, as well as osteoporosis, prior hip and knee replacements, and prior pelvic fractures, who presents with progressive back pain and is found to have acute T7 thoracic spine compression fracture, possible acute thoracic spine discitis, as well as suspected newly found metastatic cancer, and acute NSTEMI.     PLAN      Acute T7 thoracic spine compression fracture, possible acute thoracic spine discitis: large breast mass  Spinal cord edema   Assessment: Of note, Ms. Ibarra has chronic dementia. Prior notes document a history about two years ago reportedly of pelvic fractures. She initially presented to a clinic on 11/7/23 for one week of worsening back pain. X-rays showed a T 7 compression fracture.  Patient presented to the emergency department with worsening pain and right lower extremity weakness.  Patient was also noted to have low-grade fever. She has acute leukocytosis and thrombocytosis, as well as significantly elevated CRP. Procalcitonin is minimally elevated and Lactate is normal. Pro BNP is mildly elevated. She has myonecrosis from 29 to 34. A UA was negative. X-rays of the right tibia and knee are negative for acute fractures. US of the RLE is negative for DVT. The pulsatile flow suggests right heart dysfunction. CT of chest, abdomen and pelvis reveals a left breast mass  measuring 2.2 x 3.1 x 3.4 cm, suspicious for cancer, as well as a lingular pulmonary nodule suspicious for neoplasm also. MRI of the lumbar and thoracic spines show a severe compression fracture deformity at T7 with retropulsion contributing to severe spinal canal stenosis, as well as bone marrow edema diffusely involving the T7 and T8 vertebral bodies with paraspinal soft tissue induration and ventral epidural thickening. There are also cortical irregularities noted of the right humerus.     Overall, her symptoms are consistent with acute T7 thoracic spine compression fracture, possibly pathologic vs due to unwitnessed fall, causing possible acute thoracic hematoma or discitis; in the setting of suspected undiagnosed metastatic cancer, as well as possible acute NSTEMI and/or acute right sided systolic CHF exacerbation. Fortunately without clear signs at present for acute cord compression.  Neurosurgery evaluation was obtained.  Following discussion with patient's family decision was made to transition patient to comfort measures status and discharged with hospice.      Plan is to discharge patient to LTAC 11/17.      NSTEMI and possible acute right sided systolic CHF exacerbation: As above.    -- now planning for hospice / comfort cares     Chronic dementia: Monitor closely for sundowning     Hypertension: stopped home Norvasc and Lisinopril     Diverticulosis, emphysema, and aortic valve calcifications: All noted incidentally on the CT scans today.        Ne Lao MD    Significant Results and Procedures       Pending Results     Unresulted Labs Ordered in the Past 30 Days of this Admission       No orders found from 10/12/2023 to 11/12/2023.            Code Status   Comfort Care       Primary Care Physician   [facility]    Physical Exam                      There were no vitals filed for this visit.  Vital Signs with Ranges     No intake/output data recorded.    The patient was examined on the  day of discharge.    Discharge Disposition   Discharged to long-term care facility  Condition at discharge: Terminal    Consultations This Hospital Stay   CARE MANAGEMENT / SOCIAL WORK IP CONSULT  CARE MANAGEMENT / SOCIAL WORK IP CONSULT    Time Spent on this Encounter   I, Ne Lao MD, personally saw the patient today and spent greater than 30 minutes discharging this patient.    Discharge Orders      General info for SNF    Length of Stay Estimate: Short Term Care: Estimated # of Days <30  Condition at Discharge: Terminal  Level of care:skilled   Rehabilitation Potential: Fair  Admission H&P remains valid and up-to-date: Yes  Recent Chemotherapy: N/A  Use Nursing Home Standing Orders: Yes     Mantoux instructions    Give two-step Mantoux (PPD) Per Facility Policy Yes     Follow Up and recommended labs and tests    Follow up with Nursing home physician.  No follow up labs or test are needed.     Reason for your hospital stay    You had back pain from a compression fracture and found to have suspected breast cancer and will be enrolled into hospice.     Activity - Up with assistive device     No CPR- Do NOT Intubate     Oxygen (SNF/TCU) Discharge     Fall precautions     Diet    Follow this diet upon discharge: Orders Placed This Encounter      Regular Diet Adult     Discharge Medications   Current Discharge Medication List        START taking these medications    Details   acetaminophen (TYLENOL) 650 MG suppository Place 1 suppository (650 mg) rectally every 4 hours as needed for fever  Qty: 4 suppository, Refills: 0    Associated Diagnoses: SAH (subarachnoid hemorrhage) (H); Compression fracture of body of thoracic vertebra (H)      atropine 1 % ophthalmic solution Take 1 drop by mouth, place under tongue or place inside cheek every 4 hours as needed for secretions  Qty: 5 mL, Refills: 0    Associated Diagnoses: SAH (subarachnoid hemorrhage) (H); Compression fracture of body of thoracic vertebra (H)       bisacodyl (DULCOLAX) 10 MG suppository Place 1 suppository (10 mg) rectally daily as needed for constipation  Qty: 2 suppository, Refills: 0    Associated Diagnoses: SAH (subarachnoid hemorrhage) (H); Compression fracture of body of thoracic vertebra (H)      haloperidol (HALDOL) 2 MG/ML (HIGH CONC) solution Take 0.5 mLs (1 mg) by mouth or place under tongue every 6 hours as needed for agitation or other (nausea)  Qty: 10 mL, Refills: 0    Associated Diagnoses: SAH (subarachnoid hemorrhage) (H); Compression fracture of body of thoracic vertebra (H)      LORazepam (LORAZEPAM INTENSOL) 2 MG/ML (HIGH CONC) oral solution Take 0.25 mLs (0.5 mg) by mouth or place under tongue every 4 hours as needed for anxiety (restlessness)  Qty: 30 mL, Refills: 0    Associated Diagnoses: SAH (subarachnoid hemorrhage) (H); Compression fracture of body of thoracic vertebra (H)      morphine sulfate, high concentrate, (ROXANOL-CONCENTRATED) 20 MG/ML concentrated solution Take 0.25 mLs (5 mg) by mouth every 2 hours as needed for shortness of breath or breakthrough pain  Qty: 15 mL, Refills: 0    Associated Diagnoses: SAH (subarachnoid hemorrhage) (H); Compression fracture of body of thoracic vertebra (H)      senna (SENNA LAXATIVE) 8.6 MG tablet Take 1 tablet by mouth 2 times daily as needed for constipation  Qty: 50 tablet, Refills: 0    Associated Diagnoses: SAH (subarachnoid hemorrhage) (H); Compression fracture of body of thoracic vertebra (H)           STOP taking these medications       acetaminophen (TYLENOL) 325 MG tablet Comments:   Reason for Stopping:         alendronate (FOSAMAX) 70 MG tablet Comments:   Reason for Stopping:         amLODIPine (NORVASC) 2.5 MG tablet Comments:   Reason for Stopping:         lisinopril (PRINIVIL,ZESTRIL) 20 MG tablet Comments:   Reason for Stopping:         MULTIVITAMIN WITH MINERALS (HAIR,SKIN AND NAILS ORAL) Comments:   Reason for Stopping:             Allergies   Allergies   Allergen  Reactions    Atenolol      Other reaction(s): Sedation  Intol. Fatigue  At 50 mg or more  25 mg OK     Data   Most Recent 3 CBC's:  Recent Labs   Lab Test 11/12/23  0714 11/11/23  1547 05/19/22  0607   WBC 15.2* 17.9* 9.2   HGB 12.7 13.9 12.0   MCV 97 95 93    480* 392      Most Recent 3 BMP's:  Recent Labs   Lab Test 11/12/23  0714 11/11/23  1547 06/13/22  0653   * 134* 136   POTASSIUM 3.6 4.0 3.9   CHLORIDE 96* 94* 102   CO2 22 26 21*   BUN 19.0 20.4 18   CR 0.53 0.58 0.62   ANIONGAP 16* 14 13   HARJINDER 9.1 10.1 9.3   GLC 99 140* 82     Most Recent 2 LFT's:  Recent Labs   Lab Test 02/23/18  0930 02/21/18  2050   AST 40 48*   ALT 43 61*   ALKPHOS 51 64   BILITOTAL 0.5 0.4     Most Recent INR's and Anticoagulation Dosing History:  Anticoagulation Dose History          Latest Ref Rng & Units 2/21/2018   Recent Dosing and Labs   INR 0.90 - 1.10 1.03      Most Recent 3 Troponin's:No lab results found.  Most Recent Cholesterol Panel:No lab results found.  Most Recent 6 Bacteria Isolates From Any Culture (See EPIC Reports for Culture Details):No lab results found.  Most Recent TSH, T4 and A1c Labs:No lab results found.  Results for orders placed or performed during the hospital encounter of 11/11/23   US Lower Extremity Venous Duplex Right    Narrative    EXAM: US LOWER EXTREMITY VENOUS DUPLEX RIGHT  LOCATION: [facility]  DATE: 11/11/2023    INDICATION: pain  COMPARISON: None.  TECHNIQUE: Venous Duplex ultrasound of the right lower extremity with and without compression, augmentation and duplex. Color flow and spectral Doppler with waveform analysis performed.    FINDINGS: Exam includes the common femoral, femoral, popliteal, and contralateral common femoral veins as well as segmentally visualized deep calf veins and greater saphenous vein.     RIGHT: No deep vein thrombosis. No superficial thrombophlebitis.      Impression    IMPRESSION:  1.  No deep venous thrombosis in the right lower  extremity.    2.  Pulsatile venous flow suggestive of right heart dysfunction.    3.  Moderate to large knee effusion.   CT Chest/Abdomen/Pelvis w Contrast    Narrative    EXAM: CT CHEST/ABDOMEN/PELVIS W CONTRAST  LOCATION: [facility]  DATE: 11/11/2023    INDICATION: elevated WBC, pain with standing, dementia, please image throught hips as well.  COMPARISON: None.  TECHNIQUE: CT scan of the chest, abdomen, and pelvis was performed following injection of IV contrast. Multiplanar reformats were obtained. Dose reduction techniques were used.   CONTRAST: 50ml [id number]    FINDINGS:   LUNGS AND PLEURA: Moderate emphysema. There is a 1.8 cm nodule within the lingula. Moderate chronic appearing atelectasis at the lung bases, right greater than left. Trace left pleural effusion. No pulmonary mass or consolidation. No pneumothorax.    MEDIASTINUM/AXILLAE: Great vessels normal in caliber. Moderate atherosclerotic calcification of the abdominal aorta. Mild aortic valve calcification. No pericardial effusion. Cardiac chambers unremarkable. No abnormally enlarged axillary, mediastinal, or   hilar lymph nodes.    CORONARY ARTERY CALCIFICATION: Severe.    HEPATOBILIARY: Normal.    PANCREAS: Normal.    SPLEEN: Normal.    ADRENAL GLANDS: Normal.    KIDNEYS/BLADDER: Normal.    BOWEL: Moderate sigmoid diverticulosis. No evidence for diverticulitis. No free air, free fluid, or inflammatory change.    LYMPH NODES: No abnormally enlarged mesenteric, retroperitoneal, or pelvic lymph nodes.    VASCULATURE: Severe diffuse atherosclerotic calcification of the abdominal aorta and its branches. Probable at least moderate bilateral external iliac and proximal left superficial femoral artery stenosis.    PELVIC ORGANS: Prior hysterectomy     MUSCULOSKELETAL: There is a 2.2 x 3.1 x 3.4 cm left breast mass. Prior right hip replacement. There are chronic fractures of both inferior pubic rami in the left superior pubic  ramus. Diffuse osteopenia. Severe multilevel degenerative change throughout   the thoracolumbar spine. Severe degenerative change in the shoulders bilaterally. Severe T7 compression deformity please see thoracic MRI report from 11/11/2023.        Impression    IMPRESSION:  1.  No acute abnormality in the chest, abdomen, or pelvis.  2.  Left breast mass measuring 2.2 x 3.1 x 3.4 cm, suspicious for cancer. Lesion would be amenable to percutaneous biopsy if needed.  3.  Lingular pulmonary nodule suspicious for neoplasm also, and may represent primary lung cancer or metastatic disease.  4.  Moderate emphysema and bibasilar atelectasis.  5.  Trace left effusion.  6.  Aortic valve calcification correlate for possible stenosis.  7.  Moderate sigmoid diverticulosis but no diverticulitis.   XR Tibia and Fibula Right 2 Views    Narrative    EXAM: XR TIBIA AND FIBULA RIGHT 2 VIEWS  LOCATION: [facility]  DATE: 11/11/2023    INDICATION: Dementia and pain, won't stand on leg  COMPARISON: None.      Impression    IMPRESSION:   1. Old healed proximal tibia fracture with affixing hardware.  2. Old healed impaction fracture of the lateral tibial plateau.   3. Diffuse bone demineralization and arterial calcification.  4. Chondrocalcinosis in the medial and lateral compartments of the knee.  5. No evidence of acute or subacute fracture.   XR Knee Right 3 Views    Narrative    EXAM: XR KNEE RIGHT 3 VIEWS  LOCATION: [facility]  DATE: 11/11/2023    INDICATION: Dementia and pain, won't stand on leg, please do sunrise view as well.  COMPARISON: None.      Impression    IMPRESSION: Chondrocalcinosis in all 3 compartments. Diffuse bone demineralization and arterial calcification. Moderate effusion, nonspecific. Mild osteoarthrosis in the medial and lateral compartments. Severe osteoarthrosis in the patellofemoral   compartment. No evidence of acute or subacute fracture.   MR Thoracic Spine w/o  Contrast    Addendum: 11/11/2023    Described in the FINDINGS but inadvertently omitted in the IMPRESSION of the report is serpiginous bone marrow replacement involving the proximal to mid right humerus, inadequately assessed on this exam and new since 11/05/2018, indeterminate. Correlate   with dedicated right humerus radiographs.    The remainder of the report is unchanged.      Narrative    EXAM: MR THORACIC SPINE W/O CONTRAST, MR LUMBAR SPINE W/O CONTRAST  LOCATION: [facility]  DATE: 11/11/2023    INDICATION: back pain, worsening, dementia, won't walk, elevated WBC  COMPARISON: Thoracic spine radiographs dated 11/07/2023. Humerus MRI dated 11/05/2018.  TECHNIQUE:   1) Routine Thoracic Spine MRI without IV contrast.  2) Routine Lumbar Spine MRI without IV contrast.    FINDINGS:  Prematurely terminated exam which is markedly limited exam by large field-of-view and motion artifact.    THORACIC SPINE:  S-shaped thoracolumbar scoliosis with a moderate dextroconvex curvature centered at T7 and a severe levoconvex curvature centered at L2-L3. There is 3-4 mm stepwise right lateral translation at T12-L2. Moderately exaggerated thoracic kyphosis centered at   T7 where there is a severe compression fracture demonstrating approximately 90% vertebral body height loss anteriorly and 3 mm inferior endplate retropulsion. Maintained remainder of the vertebral body heights.    Bone marrow edema diffusely involving the T7 and T8 vertebral bodies with extension into the posterior elements. Paraspinal soft tissue induration at T7-T8 with epidural thickening ventrally.    Severe spinal canal stenosis at T7 secondary to fracture retropulsion. Multilevel spondylosis including disc bulges/protrusions, endplate osteophytic spurring, and hypertrophy of the ligamenta flava with mild multilevel degenerative spinal canal. Severe   bilateral foraminal stenosis at T7-T8 secondary to T7 compression fracture.    No  abnormal spinal cord signal intensity.    Advanced degenerative change of the bilateral glenohumeral joints with subchondral cystic change. New since 2018, serpiginous bone marrow replacement involving the proximal to mid right humerus (series 2 images 8-14). A trace left pleural effusion.    LUMBAR SPINE:   Nomenclature is based on 5 lumbar type vertebral bodies.     Scoliosis, as above, with 3 mm stepwise grade 1 anterolisthesis at L3-S1. Maintained vertebral body heights. Moderate to advanced multilevel intervertebral disc height loss and desiccation.    No significant bone marrow edema within the lumbar spine. Minimally increased intra-articular fluid signal intensity involving the right L2-L5 facet joints, likely degenerative. Bone marrow edema involving the sacral alae parallel to the sacroiliac   joints.    Advanced multilevel spondylosis with severe spinal canal stenosis at L4-L5, moderate at L2-L4, and mild to moderate at T12-L2. High-grade multilevel foraminal narrowing, worst and severe at L1-L2 and L3-L5 on the right, L4-S1 on the left.    Grossly unremarkable conus medullaris terminating at L1. Crowding of the cauda equina nerve roots secondary to above-described spinal canal stenosis.      Impression    IMPRESSION:  Limited exam, as above.    THORACIC SPINE MRI:  1.  A severe compression fracture deformity at T7 with retropulsion contributing to severe spinal canal stenosis.  2.  Bone marrow edema diffusely involving the T7 and T8 vertebral bodies with paraspinal soft tissue induration and ventral epidural thickening, indeterminate due to exam limitations and absence of postcontrast images. Findings may represent reactive   bone marrow edema and epidural/paraspinal hematoma in the setting of acute fracture. However, superimposed infection or underlying neoplastic process are not excluded. A contrast-enhanced thoracic spine MRI is recommended for better assessment.    LUMBAR SPINE MRI:  1.  No acute  lumbar spine compression fracture. No focal bone marrow edema to suggest an infectious process.  2.  Findings concerning for an acute bilateral sacral insufficiency fractures.  3.  Multilevel spondylosis with severe spinal canal stenosis at L4-L5.  4.  Multilevel high-grade foraminal narrowing, as described.   MR Lumbar Spine w/o Contrast    Addendum: 11/11/2023    Described in the FINDINGS but inadvertently omitted in the IMPRESSION of the report is serpiginous bone marrow replacement involving the proximal to mid right humerus, inadequately assessed on this exam and new since 11/05/2018, indeterminate. Correlate   with dedicated right humerus radiographs.    The remainder of the report is unchanged.      Narrative    EXAM: MR THORACIC SPINE W/O CONTRAST, MR LUMBAR SPINE W/O CONTRAST  LOCATION: [facility]  DATE: 11/11/2023    INDICATION: back pain, worsening, dementia, won't walk, elevated WBC  COMPARISON: Thoracic spine radiographs dated 11/07/2023. Humerus MRI dated 11/05/2018.  TECHNIQUE:   1) Routine Thoracic Spine MRI without IV contrast.  2) Routine Lumbar Spine MRI without IV contrast.    FINDINGS:  Prematurely terminated exam which is markedly limited exam by large field-of-view and motion artifact.    THORACIC SPINE:  S-shaped thoracolumbar scoliosis with a moderate dextroconvex curvature centered at T7 and a severe levoconvex curvature centered at L2-L3. There is 3-4 mm stepwise right lateral translation at T12-L2. Moderately exaggerated thoracic kyphosis centered at   T7 where there is a severe compression fracture demonstrating approximately 90% vertebral body height loss anteriorly and 3 mm inferior endplate retropulsion. Maintained remainder of the vertebral body heights.    Bone marrow edema diffusely involving the T7 and T8 vertebral bodies with extension into the posterior elements. Paraspinal soft tissue induration at T7-T8 with epidural thickening ventrally.    Severe  spinal canal stenosis at T7 secondary to fracture retropulsion. Multilevel spondylosis including disc bulges/protrusions, endplate osteophytic spurring, and hypertrophy of the ligamenta flava with mild multilevel degenerative spinal canal. Severe   bilateral foraminal stenosis at T7-T8 secondary to T7 compression fracture.    No abnormal spinal cord signal intensity.    Advanced degenerative change of the bilateral glenohumeral joints with subchondral cystic change. New since 2018, serpiginous bone marrow replacement involving the proximal to mid right humerus (series 2 images 8-14). A trace left pleural effusion.    LUMBAR SPINE:   Nomenclature is based on 5 lumbar type vertebral bodies.     Scoliosis, as above, with 3 mm stepwise grade 1 anterolisthesis at L3-S1. Maintained vertebral body heights. Moderate to advanced multilevel intervertebral disc height loss and desiccation.    No significant bone marrow edema within the lumbar spine. Minimally increased intra-articular fluid signal intensity involving the right L2-L5 facet joints, likely degenerative. Bone marrow edema involving the sacral alae parallel to the sacroiliac   joints.    Advanced multilevel spondylosis with severe spinal canal stenosis at L4-L5, moderate at L2-L4, and mild to moderate at T12-L2. High-grade multilevel foraminal narrowing, worst and severe at L1-L2 and L3-L5 on the right, L4-S1 on the left.    Grossly unremarkable conus medullaris terminating at L1. Crowding of the cauda equina nerve roots secondary to above-described spinal canal stenosis.      Impression    IMPRESSION:  Limited exam, as above.    THORACIC SPINE MRI:  1.  A severe compression fracture deformity at T7 with retropulsion contributing to severe spinal canal stenosis.  2.  Bone marrow edema diffusely involving the T7 and T8 vertebral bodies with paraspinal soft tissue induration and ventral epidural thickening, indeterminate due to exam limitations and absence of  postcontrast images. Findings may represent reactive   bone marrow edema and epidural/paraspinal hematoma in the setting of acute fracture. However, superimposed infection or underlying neoplastic process are not excluded. A contrast-enhanced thoracic spine MRI is recommended for better assessment.    LUMBAR SPINE MRI:  1.  No acute lumbar spine compression fracture. No focal bone marrow edema to suggest an infectious process.  2.  Findings concerning for an acute bilateral sacral insufficiency fractures.  3.  Multilevel spondylosis with severe spinal canal stenosis at L4-L5.  4.  Multilevel high-grade foraminal narrowing, as described.

## 2023-11-17 NOTE — PROGRESS NOTES
Post discharge encounter: 11/17/2023 1524    Writer received a call from the patient's son in law, Rigoberto, at 1513 on 11/17 stating that they had arrived with the patient to [facility] and were informed they did not have a hospice agency set up for them.     Writer called [employer] ([phone]) and spoke with Patric.  Patric stated that he had called the patient's daughter, Latoya, yesterday 11/16 but she declined at that time to schedule an admission for the patient.  At the time of this encounter between Patric and the patient's daughter, social work was not aware of this information.     Patric stated the earliest they have to schedule an admission is Sunday 11/19 at 6 PM.     Writer called the patient's son, Rigoberto [phone], writer updated/informed Rigoberto of the reason why they do not have a hospice agency currently.  Rigoberto stated they had also called [facility] and were aware that the earliest is Sunday at 6 PM.  Rigoberto stated they were worried that the patient would pass before that time and therefore if there was a point for hospice to follow.    Writer spoke with , Corina Contreras, who referred writer to call Fatmata Sepulveda with [employer], [phone].    Writer called Fatmata Guerrero and left a voice message regarding the situation.    Writer received a call back from Fatmata at 1559 on 11/17.  Fatmata inquired about the specifics of the situation.  Writer clarified that on 11/16 writealphonso had finalized with the family a plan to have the patient discharged to [facility] with [facility] hosp[facility].  Writer had updated Patric with [employer] about the transportation time to [facility] with [facility] transport (stretcher) at [phone].  Writer stated that at the time he had updated Patric, Patric only confirmed transportation time.  Writer stated patient's son-in-law's concern and informed Fatmata that the patient was discharged with orders for oxygen.  Fatmata stated that  she was hoping the LTC will take care of of her oxygen.  Fatmata stated when speaking with Patric, he had spoken with the patient's daughter and had plan to continue the conversation today on 11/17.  Writer clarified that he was not aware of this conversation between Patric and the patient's daughter, Latoya, after updating him on the discharge plan.  Writealphonso stated he had not received a call from [facility].  Fatmata stated that they are in contact with the DON at the [facility]. Fatmata and darleenr agreed on planning to have [facility] continue to assist the LTC while they are still looking for another hospice agency that can follow-up with the patient and family ASAP.  Fatmata stated she will speak with her team.     Writer informed Fatmata to either call him before he leaves at the end of the day or to call SW supervisor, Corina Contreras.    Writer received a call at 1627 on 11/17 from Fatmata Sepulveda.  Fatmata stated that they were informed from [facility] they were able to find another agency that can provide admission tonight.  Fatmata stated they were not informed of the name of the agency.  Fatmata stated that at that time Patric and the patient's daughter Latoya had communicated, Patric should have updated writer on the declined to schedule an admission with the patient's daughter.  Fatmata stated Patric should have clarified that they would not be able to provide an admission until Saturday.  Fatmata stated they will speak with Patric on Monday.         CHOCO Elkins  [facility]  Social Work

## 2023-11-17 NOTE — PROGRESS NOTES
Pt unable to state name later in shift, disoriented x4. Pt slept through most of the night. Pain managed with prn oxy, crushed w/ pudding. SL ativan given x2. Urine incontinence x1. Repositioning, 2L O2 & oral care for comfort. No IV access.

## 2023-11-17 NOTE — PROGRESS NOTES
Pt is on hospice care. Vitals and head-toe assessments were deferred. Pain and anxiety were managed with PRN morphine and lorazepam. Turn and repo q2hrs, mouth care was done. Pt was discharged on 11/17/23 at 1315 to a facility on hospice care. Pt's file was given to the transporter to be delivered to the facility upon arrival. Pt was accompanied by her family.

## 2023-11-17 NOTE — PROVIDER NOTIFICATION
MD Notification    Notified Person: MD    Notified Person Name: Ekta Newman    Notification Date/Time: 11/16/23 1853    Notification Interaction: Vocera Messaging    Purpose of Notification:     Pt on comfort cares and having s/s of terminal restlesness. Can we please have agitation meds like Haldol or Ativan? Thank you!    Orders Received: Comfort care set orders    Comments: See MAR

## 2023-11-17 NOTE — PROGRESS NOTES
Care Management Follow Up    Length of Stay (days): 5    Expected Discharge Date: 11/17/2023     Concerns to be Addressed:     (Needs hospice arranged)  Patient plan of care discussed at interdisciplinary rounds: Yes    Anticipated Discharge Disposition: Assisted Living     Anticipated Discharge Services:    Anticipated Discharge DME:      Patient/family educated on Medicare website which has current facility and service quality ratings:    Education Provided on the Discharge Plan:    Patient/Family in Agreement with the Plan:      Referrals Placed by CM/SW:    Private pay costs discussed: Not applicable    Additional Information:  [facility] sent over orders through [facility].     Writer got a call from [facility] at [facility] wanting to confirm the patient was going to their facility today. Writer confirmed and gave rider time.     Writer spoke with bedside rn to make sure pharmacy was providing a 3 day supply of meds.     Addendum 1114:  Writer got an update from the [facility] here at [facility] that they received a call from the [facility] they did not have orders and provided a different fax number than the one on [facility].    Writer sent orders to [phone]        CHOCO Elkins  [facility]  Social Work

## 2023-11-17 NOTE — PROGRESS NOTES
Care Management Discharge Note    Discharge Date: 11/17/2023       Discharge Disposition: Assisted Living    Discharge Services:      Discharge DME:      Discharge Transportation: other (see comments) (May need stretcher transport arranged at discharge)    Private pay costs discussed: Not applicable    Does the patient's insurance plan have a 3 day qualifying hospital stay waiver?  No    PAS Confirmation Code: [id number]  Patient/family educated on Medicare website which has current facility and service quality ratings:      Education Provided on the Discharge Plan:    Persons Notified of Discharge Plans: Patient's family, LTC, [facility] nursing staff, and Hospice agency.  Patient/Family in Agreement with the Plan:      Handoff Referral Completed: No    Additional Information:  Patient will discharge from [facility] to [facility] on hospice with [facility] with [facility] transport stretcher  at [phone].  Please refer to  progress notes for further details.      CHOCO Elkins  [facility]  Social Work

## 2023-11-18 NOTE — PROGRESS NOTES
[facility] [place]    Background: Transitional Care Management program identified per system criteria and reviewed by [facility] [place] team for possible outreach.    Assessment: Upon chart review, [facility] Team member will not proceed with patient outreach related to this episode of Transitional Care Management program due to reason below:    Patient has discharged to a Memory Care, Long-term Care, Assisted Living or Group Home where patient is receiving on-site support with their daily cares, including support with hospital follow up plan.    Plan: Transitional Care Management episode addressed appropriately per reason noted above.      Linda Persaud RN  [facility] [place], [facility]    *Connected Care Resource Team does NOT follow patient ongoing. Referrals are identified based on internal discharge reports and the outreach is to ensure patient has an understanding of their discharge instructions.

## 2023-11-20 NOTE — CONFIDENTIAL NOTE
Geriatrics Notification of Patient Death    Provider: LOTTIE Lara  Place of death: [facility]   Facility Type:  LTC    Caller: Facility nurse  Date of Death: 11/20/23 @ 1030    Patient was on Hospice care: Yes; please explain: [facility]  Patient was seen by [facility] provider: Peggy Crawford RN

## 2023-11-27 ENCOUNTER — DOCUMENTATION ONLY (OUTPATIENT)
Dept: OTHER | Facility: CLINIC | Age: 88
End: 2023-11-27
Payer: COMMERCIAL